# Patient Record
Sex: FEMALE | Race: WHITE | Employment: OTHER | URBAN - METROPOLITAN AREA
[De-identification: names, ages, dates, MRNs, and addresses within clinical notes are randomized per-mention and may not be internally consistent; named-entity substitution may affect disease eponyms.]

---

## 2019-04-19 ENCOUNTER — OFFICE VISIT (OUTPATIENT)
Dept: INTERNAL MEDICINE CLINIC | Age: 74
End: 2019-04-19

## 2019-04-19 VITALS
TEMPERATURE: 98.2 F | OXYGEN SATURATION: 96 % | RESPIRATION RATE: 14 BRPM | WEIGHT: 239 LBS | SYSTOLIC BLOOD PRESSURE: 128 MMHG | DIASTOLIC BLOOD PRESSURE: 64 MMHG | HEIGHT: 66 IN | BODY MASS INDEX: 38.41 KG/M2 | HEART RATE: 71 BPM

## 2019-04-19 DIAGNOSIS — E03.9 HYPOTHYROIDISM, UNSPECIFIED TYPE: ICD-10-CM

## 2019-04-19 DIAGNOSIS — Z76.89 ENCOUNTER TO ESTABLISH CARE: ICD-10-CM

## 2019-04-19 DIAGNOSIS — M85.80 OSTEOPENIA, UNSPECIFIED LOCATION: ICD-10-CM

## 2019-04-19 DIAGNOSIS — E66.9 CLASS 2 OBESITY WITHOUT SERIOUS COMORBIDITY IN ADULT, UNSPECIFIED BMI, UNSPECIFIED OBESITY TYPE: ICD-10-CM

## 2019-04-19 DIAGNOSIS — M25.552 LEFT HIP PAIN: ICD-10-CM

## 2019-04-19 DIAGNOSIS — J44.9 CHRONIC OBSTRUCTIVE PULMONARY DISEASE, UNSPECIFIED COPD TYPE (HCC): Primary | ICD-10-CM

## 2019-04-19 DIAGNOSIS — R73.09 ELEVATED GLUCOSE: ICD-10-CM

## 2019-04-19 DIAGNOSIS — M54.50 ACUTE MIDLINE LOW BACK PAIN WITHOUT SCIATICA: ICD-10-CM

## 2019-04-19 DIAGNOSIS — E78.00 HIGH CHOLESTEROL: ICD-10-CM

## 2019-04-19 DIAGNOSIS — J44.9 COPD, MILD (HCC): ICD-10-CM

## 2019-04-19 RX ORDER — CLOBETASOL PROPIONATE 0.5 MG/G
CREAM TOPICAL
COMMUNITY
End: 2019-06-04 | Stop reason: SDUPTHER

## 2019-04-19 RX ORDER — LEVOTHYROXINE SODIUM 112 UG/1
TABLET ORAL
COMMUNITY
End: 2019-06-24 | Stop reason: SDUPTHER

## 2019-04-19 RX ORDER — SIMVASTATIN 20 MG/1
TABLET, FILM COATED ORAL
COMMUNITY
End: 2019-06-24 | Stop reason: SDUPTHER

## 2019-04-19 RX ORDER — CLOBETASOL PROPIONATE 0.05 MG/G
GEL TOPICAL AS NEEDED
COMMUNITY
End: 2019-09-12 | Stop reason: SDUPTHER

## 2019-04-19 NOTE — PROGRESS NOTES
Sandy Clay is a 75 y.o.  female and presents with    Chief Complaint   Patient presents with   [facility]     intermittent left hip pain worse when walking- no urinary symptoms x 2 weeks   room 11       Subjective:  HPI  Mrs. Yuko Lezama presents today to [facility]. She reports with left hip pain worse with walking. She reports last year with similar symptoms. Denies numbness and tingling, edema, heat, redness, reports catches when moves a certain way. She is taking Advil with relief for a couple of hours. She reports never had xrays. Does report hx of osteopenia on DEXA completed 2018. She reports used movers. She denies trauma. Mrs. Yuko Lezama is , reports  is sick, she moved her from [place], [place] recently. She is with a history hyperlipidemia, she is taking Simvastin. She is with hypothyroidism, on Levothyroxine. She is with acid reflux. Taking OTC Zantac if needed with relief. Tomato based foods are the trigger. She is with a hx of bilateral knee replacement 2013, 2016. She reports is with history of osteopenia. She is taking OTC ca with vitamin d and a multivitamin. She is a former smoker, quit 2009. She is diagnosed with mild COPD, reports was sent for PFTs and reports failed it? HM includes: DEXA 11/2018, mammogram 11/2018, Influenza 10/2018, pneumo 23? 6/2015, Zostavax 2012, Tdap ? Additional Concerns: none     ROS   Review of Systems   Musculoskeletal: Positive for back pain and joint pain. All other systems reviewed and are negative. Allergies   Allergen Reactions    Penicillins Rash       Current Outpatient Medications   Medication Sig Dispense Refill    simvastatin (ZOCOR) 20 mg tablet Take  by mouth nightly.  levothyroxine (SYNTHROID) 112 mcg tablet Take  by mouth Daily (before breakfast).  clobetasol (TEMOVATE) 0.05 % topical gel Apply  to affected area as needed.       clobetasol (TEMOVATE) 0.05 % topical cream Apply  to affected area daily as needed for Skin Irritation.  umeclidinium (INCRUSE ELLIPTA) 62.5 mcg/actuation inhaler Take 1 Puff by inhalation daily.  1 Inhaler 3       Social History     Socioeconomic History    Marital status:      Spouse name: Not on file    Number of children: Not on file    Years of education: Not on file    Highest education level: Not on file   Occupational History    Not on file   Social Needs    Financial resource strain: Not on file    Food insecurity:     Worry: Not on file     Inability: Not on file    Transportation needs:     Medical: Not on file     Non-medical: Not on file   Tobacco Use    Smoking status: Former Smoker     Packs/day: 0.50     Years: 40.00     Pack years: 20.00     Last attempt to quit: 4/19/2009     Years since quitting: 10.0    Smokeless tobacco: Never Used   Substance and Sexual Activity    Alcohol use: Not Currently    Drug use: Not Currently    Sexual activity: Yes     Partners: Male   Lifestyle    Physical activity:     Days per week: Not on file     Minutes per session: Not on file    Stress: Not on file   Relationships    Social connections:     Talks on phone: Not on file     Gets together: Not on file     Attends Mu-ism service: Not on file     Active member of club or organization: Not on file     Attends meetings of clubs or organizations: Not on file     Relationship status: Not on file    Intimate partner violence:     Fear of current or ex partner: Not on file     Emotionally abused: Not on file     Physically abused: Not on file     Forced sexual activity: Not on file   Other Topics Concern    Not on file   Social History Narrative    Not on file       Past Medical History:   Diagnosis Date    Acid reflux     COPD, mild ([address]     High cholesterol     Hypothyroidism     Joint pain     Joint swelling     Lichen planus     Sinus problem     Urinary frequency        Past Surgical History:   Procedure Laterality Date    HX KNEE REPLACEMENT Bilateral     left 2013, right 2016    HX PARTIAL HYSTERECTOMY  1967       Family History   Problem Relation Age of Onset    Heart Disease Mother     Hypertension Mother     No Known Problems Father     Breast Cancer Paternal Aunt     Diabetes Maternal Grandmother     Cancer Brother         pancreatic cancer       Objective:  Vitals:    04/19/19 1154   BP: 128/64   Pulse: 71   Resp: 14   Temp: 98.2 °F (36.8 °C)   TempSrc: Oral   SpO2: 96%   Weight: 239 lb (108.4 kg)   Height: 5' 6.25\" (1.683 m)   PainSc:   5   PainLoc: Hip       LABS   No results found for this or any previous visit. TESTS  none    PE  Physical Exam   Constitutional: She is oriented to person, place, and time. She appears well-developed and well-nourished. No distress. HENT:   Head: Normocephalic and atraumatic. Eyes: Conjunctivae are normal.   Neck: Normal range of motion. Cardiovascular: Normal rate, regular rhythm, normal heart sounds and intact distal pulses. No murmur heard. Pulmonary/Chest: Effort normal and breath sounds normal. No respiratory distress. She has no wheezes. She has no rales. She exhibits no tenderness. Abdominal: Soft. Bowel sounds are normal.   Truncal obesity   Musculoskeletal: Normal range of motion. Positive left straight leg raise, TTP over lumbar spine on palpation that radiates linearly to left hip region, no elongation limb, no rash or s/s infection   Lymphadenopathy:     She has no cervical adenopathy. Neurological: She is alert and oriented to person, place, and time. She has normal reflexes. Skin: Skin is warm and dry. She is not diaphoretic. No erythema. Psychiatric: She has a normal mood and affect. Her behavior is normal. Judgment and thought content normal.         Assessment/Plan:    1. Establish care- Labs ordered to be completed with fasting. 2. Hx HPL- Labs ordered.     3. Lumbar spinal pain with left hip pain- xray lumbar and hip/pelvis ordered- patient to complete next week per her, vitamin d ordered, reported osteopenia on Dexa 11/2018 reported    4. Obesity- lifestyle changes. Lab review: orders written for new lab studies as appropriate; see orders    Today's Visit:   Diagnoses and all orders for this visit:    1. Chronic obstructive pulmonary disease, unspecified COPD type (HCC)  -     umeclidinium (INCRUSE ELLIPTA) 62.5 mcg/actuation inhaler; Take 1 Puff by inhalation daily. 2. Encounter to establish care  -     CBC WITH AUTOMATED DIFF; Future  -     METABOLIC PANEL, COMPREHENSIVE; Future  -     HEMOGLOBIN A1C WITH EAG; Future  -     LIPID PANEL; Future  -     MICROALBUMIN, UR, RAND W/ MICROALB/CREAT RATIO; Future  -     TSH 3RD GENERATION; Future  -     URINALYSIS W/ RFLX MICROSCOPIC; Future  -     VITAMIN D, 25 HYDROXY; Future    3. Acute midline low back pain without sciatica  -     CBC WITH AUTOMATED DIFF; Future  -     METABOLIC PANEL, COMPREHENSIVE; Future  -     HEMOGLOBIN A1C WITH EAG; Future  -     LIPID PANEL; Future  -     MICROALBUMIN, UR, RAND W/ MICROALB/CREAT RATIO; Future  -     TSH 3RD GENERATION; Future  -     URINALYSIS W/ RFLX MICROSCOPIC; Future  -     VITAMIN D, 25 HYDROXY; Future  -     XR SPINE LUMB 2 OR 3 V; Future  -     XR HIP LT W OR WO PELV 2-3 VWS; Future    4. Left hip pain  -     CBC WITH AUTOMATED DIFF; Future  -     METABOLIC PANEL, COMPREHENSIVE; Future  -     HEMOGLOBIN A1C WITH EAG; Future  -     LIPID PANEL; Future  -     MICROALBUMIN, UR, RAND W/ MICROALB/CREAT RATIO; Future  -     TSH 3RD GENERATION; Future  -     URINALYSIS W/ RFLX MICROSCOPIC; Future  -     VITAMIN D, 25 HYDROXY; Future  -     XR SPINE LUMB 2 OR 3 V; Future  -     XR HIP LT W OR WO PELV 2-3 VWS; Future    5. Hypothyroidism, unspecified type  -     CBC WITH AUTOMATED DIFF; Future  -     METABOLIC PANEL, COMPREHENSIVE; Future  -     HEMOGLOBIN A1C WITH EAG; Future  -     LIPID PANEL;  Future  -     MICROALBUMIN, UR, RAND W/ MICROALB/CREAT RATIO; Future  -     TSH 3RD GENERATION; Future  -     URINALYSIS W/ RFLX MICROSCOPIC; Future  -     VITAMIN D, 25 HYDROXY; Future    6. COPD, mild ([address]    7. High cholesterol  -     CBC WITH AUTOMATED DIFF; Future  -     METABOLIC PANEL, COMPREHENSIVE; Future  -     HEMOGLOBIN A1C WITH EAG; Future  -     LIPID PANEL; Future  -     MICROALBUMIN, UR, RAND W/ MICROALB/CREAT RATIO; Future  -     TSH 3RD GENERATION; Future  -     URINALYSIS W/ RFLX MICROSCOPIC; Future  -     VITAMIN D, 25 HYDROXY; Future    8. Class 2 obesity without serious comorbidity in adult, unspecified BMI, unspecified obesity type  -     CBC WITH AUTOMATED DIFF; Future  -     METABOLIC PANEL, COMPREHENSIVE; Future  -     HEMOGLOBIN A1C WITH EAG; Future  -     LIPID PANEL; Future  -     MICROALBUMIN, UR, RAND W/ MICROALB/CREAT RATIO; Future  -     TSH 3RD GENERATION; Future  -     URINALYSIS W/ RFLX MICROSCOPIC; Future  -     VITAMIN D, 25 HYDROXY; Future    9. Osteopenia, unspecified location    10. Elevated glucose  -     HEMOGLOBIN A1C WITH EAG; Future            Health Maintenance: Pending records. I have discussed the diagnosis with the patient and the intended plan as seen in the above orders. The patient has received an after-visit summary and questions were answered concerning future plans. I have discussed medication side effects and warnings with the patient as well. I have reviewed the plan of care with the patient, accepted their input and they are in agreement with the treatment goals. More than 1/2 of this 30 minute visit was spent in counseling and coordination of care, as described above.     BECKY Crockett  Internist of [address], [facility] Candy Escamilla.  Phone: [phone]  Fax: [phone]

## 2019-04-19 NOTE — PROGRESS NOTES
Sandy Clay presents today for   Chief Complaint   Patient presents with   [facility] Establish Care     intermittent left side back pain worse when walking- no urinary symptoms x 2 weeks   room 11              Depression Screening:  3 most recent PHQ Screens 4/19/2019   Little interest or pleasure in doing things Not at all   Feeling down, depressed, irritable, or hopeless Not at all   Total Score PHQ 2 0       Learning Assessment:  Learning Assessment 4/19/2019   PRIMARY LEARNER Patient   HIGHEST LEVEL OF EDUCATION - PRIMARY LEARNER  [id number] [address] PRIMARY LEARNER NONE   CO-LEARNER CAREGIVER No   PRIMARY LANGUAGE ENGLISH   LEARNER PREFERENCE PRIMARY READING   ANSWERED BY PATIENT   RELATIONSHIP SELF       Abuse Screening:  Abuse Screening Questionnaire 4/19/2019   Do you ever feel afraid of your partner? N   Are you in a relationship with someone who physically or mentally threatens you? N   Is it safe for you to go home? Y       Fall Risk  Fall Risk Assessment, last 12 mths 4/19/2019   Able to walk? Yes   Fall in past 12 months? No           Coordination of Care:  1. Have you been to the ER, urgent care clinic since your last visit? NO   Hospitalized since your last visit? NO     2. Have you seen or consulted any other health care providers outside of the [address] [place] [address] [facility] since your last visit? Include any pap smears or colon screening.  Recent move 3/5/19 from [facility]

## 2019-04-21 PROBLEM — E66.01 SEVERE OBESITY (HCC): Status: ACTIVE | Noted: 2019-04-21

## 2019-04-23 ENCOUNTER — HOSPITAL ENCOUNTER (OUTPATIENT)
Dept: LAB | Age: 74
Discharge: HOME | End: 2019-04-23

## 2019-04-23 ENCOUNTER — HOSPITAL ENCOUNTER (OUTPATIENT)
Dept: GENERAL RADIOLOGY | Age: 74
Discharge: HOME | End: 2019-04-23
Payer: MEDICARE

## 2019-04-23 DIAGNOSIS — M25.552 LEFT HIP PAIN: ICD-10-CM

## 2019-04-23 DIAGNOSIS — M54.50 ACUTE MIDLINE LOW BACK PAIN WITHOUT SCIATICA: ICD-10-CM

## 2019-04-23 LAB — XX-LABCORP SPECIMEN COL,LCBCF: NORMAL

## 2019-04-23 PROCEDURE — 73502 X-RAY EXAM HIP UNI 2-3 VIEWS: CPT

## 2019-04-23 PROCEDURE — 72100 X-RAY EXAM L-S SPINE 2/3 VWS: CPT

## 2019-04-23 PROCEDURE — 99001 SPECIMEN HANDLING PT-LAB: CPT

## 2019-04-24 ENCOUNTER — TELEPHONE (OUTPATIENT)
Dept: INTERNAL MEDICINE CLINIC | Age: 74
End: 2019-04-24

## 2019-04-24 LAB
25(OH)D3+25(OH)D2 SERPL-MCNC: 71.7 NG/ML (ref 30–100)
ALBUMIN SERPL-MCNC: 4 G/DL (ref 3.5–4.8)
ALBUMIN/CREAT UR: <5.3 MG/G CREAT (ref 0–30)
ALBUMIN/GLOB SERPL: 1.7 {RATIO} (ref 1.2–2.2)
ALP SERPL-CCNC: 93 IU/L (ref 39–117)
ALT SERPL-CCNC: 13 IU/L (ref 0–32)
APPEARANCE UR: CLEAR
AST SERPL-CCNC: 19 IU/L (ref 0–40)
BASOPHILS # BLD AUTO: 0 X10E3/UL (ref 0–0.2)
BASOPHILS NFR BLD AUTO: 1 %
BILIRUB SERPL-MCNC: 0.4 MG/DL (ref 0–1.2)
BILIRUB UR QL STRIP: NEGATIVE
BUN SERPL-MCNC: 15 MG/DL (ref 8–27)
BUN/CREAT SERPL: 23 (ref 12–28)
CALCIUM SERPL-MCNC: 9.3 MG/DL (ref 8.7–10.3)
CHLORIDE SERPL-SCNC: 102 MMOL/L (ref 96–106)
CHOLEST SERPL-MCNC: 167 MG/DL (ref 100–199)
CO2 SERPL-SCNC: 25 MMOL/L (ref 20–29)
COLOR UR: YELLOW
CREAT SERPL-MCNC: 0.66 MG/DL (ref 0.57–1)
CREAT UR-MCNC: 56.2 MG/DL
EOSINOPHIL # BLD AUTO: 0.1 X10E3/UL (ref 0–0.4)
EOSINOPHIL NFR BLD AUTO: 2 %
ERYTHROCYTE [DISTWIDTH] IN BLOOD BY AUTOMATED COUNT: 14.3 % (ref 12.3–15.4)
EST. AVERAGE GLUCOSE BLD GHB EST-MCNC: 105 MG/DL
GLOBULIN SER CALC-MCNC: 2.4 G/DL (ref 1.5–4.5)
GLUCOSE SERPL-MCNC: 84 MG/DL (ref 65–99)
GLUCOSE UR QL: NEGATIVE
HBA1C MFR BLD: 5.3 % (ref 4.8–5.6)
HCT VFR BLD AUTO: 40.5 % (ref 34–46.6)
HDLC SERPL-MCNC: 63 MG/DL
HGB BLD-MCNC: 13.6 G/DL (ref 11.1–15.9)
HGB UR QL STRIP: NEGATIVE
IMM GRANULOCYTES # BLD AUTO: 0 X10E3/UL (ref 0–0.1)
IMM GRANULOCYTES NFR BLD AUTO: 0 %
INTERPRETATION, 910389: NORMAL
KETONES UR QL STRIP: NEGATIVE
LDLC SERPL CALC-MCNC: 91 MG/DL (ref 0–99)
LEUKOCYTE ESTERASE UR QL STRIP: NEGATIVE
LYMPHOCYTES # BLD AUTO: 1.3 X10E3/UL (ref 0.7–3.1)
LYMPHOCYTES NFR BLD AUTO: 25 %
MCH RBC QN AUTO: 31.8 PG (ref 26.6–33)
MCHC RBC AUTO-ENTMCNC: 33.6 G/DL (ref 31.5–35.7)
MCV RBC AUTO: 95 FL (ref 79–97)
MICRO URNS: NORMAL
MICROALBUMIN UR-MCNC: <3 UG/ML
MONOCYTES # BLD AUTO: 0.5 X10E3/UL (ref 0.1–0.9)
MONOCYTES NFR BLD AUTO: 10 %
NEUTROPHILS # BLD AUTO: 3.3 X10E3/UL (ref 1.4–7)
NEUTROPHILS NFR BLD AUTO: 62 %
NITRITE UR QL STRIP: NEGATIVE
PH UR STRIP: 6.5 [PH] (ref 5–7.5)
PLATELET # BLD AUTO: 189 X10E3/UL (ref 150–379)
POTASSIUM SERPL-SCNC: 4.4 MMOL/L (ref 3.5–5.2)
PROT SERPL-MCNC: 6.4 G/DL (ref 6–8.5)
PROT UR QL STRIP: NEGATIVE
RBC # BLD AUTO: 4.28 X10E6/UL (ref 3.77–5.28)
SODIUM SERPL-SCNC: 142 MMOL/L (ref 134–144)
SP GR UR: 1.01 (ref 1–1.03)
TRIGL SERPL-MCNC: 63 MG/DL (ref 0–149)
TSH SERPL DL<=0.005 MIU/L-ACNC: 2.19 UIU/ML (ref 0.45–4.5)
UROBILINOGEN UR STRIP-MCNC: 0.2 MG/DL (ref 0.2–1)
VLDLC SERPL CALC-MCNC: 13 MG/DL (ref 5–40)
WBC # BLD AUTO: 5.3 X10E3/UL (ref 3.4–10.8)

## 2019-04-24 NOTE — PROGRESS NOTES
Spine- Multilevel degenerative spondylosis (spinal osteoarthritis) and  disc disease noted with multiple bridging osteophytes, with relatively most  prominent disc height loss at L4/L5 and L5/S1.     Lt hip- normal

## 2019-04-24 NOTE — TELEPHONE ENCOUNTER
Chief Complaint   Patient presents with    Results     done 4-23-19 Xray Left Hip with or without PELV 2-3 VWS and Xray Spine LUMB 2 or 3 vws per NP Avani     04-24-19 Patient reached and 2 identifiers were used: Full Name, and Date of Birth verified. The patient was informed of all results, and understands all. The patient has no further questions at this time.

## 2019-04-24 NOTE — TELEPHONE ENCOUNTER
----- Message from Marisa Cuellar NP sent at 4/23/2019  9:27 PM EDT -----  Spine- Multilevel degenerative spondylosis (spinal osteoarthritis) and  disc disease noted with multiple bridging osteophytes, with relatively most  prominent disc height loss at L4/L5 and L5/S1.     Lt hip- normal

## 2019-04-25 ENCOUNTER — TELEPHONE (OUTPATIENT)
Dept: INTERNAL MEDICINE CLINIC | Age: 74
End: 2019-04-25

## 2019-06-03 ENCOUNTER — TELEPHONE (OUTPATIENT)
Dept: INTERNAL MEDICINE CLINIC | Age: 74
End: 2019-06-03

## 2019-06-03 NOTE — TELEPHONE ENCOUNTER
Pt was seen at [facility] 06/02/2019 she is being referred to [facility] in [place] pts appt is 06/04 at 245pm her insurance requires a referral    Please submit ICD S42.20a  Fax

## 2019-06-04 ENCOUNTER — OFFICE VISIT (OUTPATIENT)
Dept: ORTHOPEDIC SURGERY | Age: 74
End: 2019-06-04

## 2019-06-04 VITALS
WEIGHT: 242 LBS | DIASTOLIC BLOOD PRESSURE: 68 MMHG | TEMPERATURE: 97.8 F | HEIGHT: 66 IN | HEART RATE: 75 BPM | RESPIRATION RATE: 16 BRPM | OXYGEN SATURATION: 97 % | SYSTOLIC BLOOD PRESSURE: 144 MMHG | BODY MASS INDEX: 38.89 KG/M2

## 2019-06-04 DIAGNOSIS — S42.252A DISPLACED FRACTURE OF GREATER TUBEROSITY OF LEFT HUMERUS, INITIAL ENCOUNTER FOR CLOSED FRACTURE: Primary | ICD-10-CM

## 2019-06-04 DIAGNOSIS — M25.512 LEFT SHOULDER PAIN, UNSPECIFIED CHRONICITY: ICD-10-CM

## 2019-06-04 RX ORDER — ASPIRIN 81 MG/1
81 TABLET ORAL DAILY
COMMUNITY

## 2019-06-04 RX ORDER — ERGOCALCIFEROL 1.25 MG/1
50000 CAPSULE ORAL
COMMUNITY
End: 2019-09-12 | Stop reason: DRUGHIGH

## 2019-06-04 RX ORDER — BISMUTH SUBSALICYLATE 262 MG
1 TABLET,CHEWABLE ORAL DAILY
COMMUNITY

## 2019-06-04 RX ORDER — HYDROCODONE BITARTRATE AND ACETAMINOPHEN 5; 325 MG/1; MG/1
1 TABLET ORAL
Qty: 28 TAB | Refills: 0 | Status: SHIPPED | OUTPATIENT
Start: 2019-06-04 | End: 2019-06-11

## 2019-06-04 NOTE — PROGRESS NOTES
1. Have you been to the ER, urgent care clinic since your last visit? Hospitalized since your last visit? YES, [facility]       2. Have you seen or consulted any other health care providers outside of the [address] [place] [address] since your last visit? Include any pap smears or colon screening.  NO

## 2019-06-04 NOTE — PROGRESS NOTES
Micheal Pimentel  [birth year]   Chief Complaint   Patient presents with    Shoulder Pain     RIGHT SHOULDER PAIN        HISTORY OF PRESENT ILLNESS  Micheal Pimentel is a 75 y.o. female who presents today for evaluation of left shoulder pain. Patient fell in kitchen on Sunday. Has been having a hard time moving arm since. Patient describes the pain as aching, sharp and throbbing that is Constant in nature. Symptoms are worse with movement and is better with  rest.  Associated symptoms include Swelling. Since problem started, it: is unchanged. Pain does wake patient up at night. Has taken nothing for the problem. Pain is a 5/10. Has tried following treatments: Injections:NO; Brace:YES; Therapy:NO; Cane/Crutch:NO        .      Allergies   Allergen Reactions    Penicillins Rash        Past Medical History:   Diagnosis Date    Acid reflux     COPD, mild (HCC)     High cholesterol     Hypothyroidism     Joint pain     Joint swelling     Lichen planus     Sinus problem     Urinary frequency       Social History     Socioeconomic History    Marital status:      Spouse name: Not on file    Number of children: Not on file    Years of education: Not on file    Highest education level: Not on file   Occupational History    Not on file   Social Needs    Financial resource strain: Not on file    Food insecurity:     Worry: Not on file     Inability: Not on file    Transportation needs:     Medical: Not on file     Non-medical: Not on file   Tobacco Use    Smoking status: Former Smoker     Packs/day: 0.50     Years: 40.00     Pack years: 20.00     Last attempt to quit: 4/19/2009     Years since quitting: 10.1    Smokeless tobacco: Never Used   Substance and Sexual Activity    Alcohol use: Not Currently    Drug use: Not Currently    Sexual activity: Yes     Partners: Male   Lifestyle    Physical activity:     Days per week: Not on file     Minutes per session: Not on file    Stress: Not on file   Relationships    Social connections:     Talks on phone: Not on file     Gets together: Not on file     Attends Mandaen service: Not on file     Active member of club or organization: Not on file     Attends meetings of clubs or organizations: Not on file     Relationship status: Not on file    Intimate partner violence:     Fear of current or ex partner: Not on file     Emotionally abused: Not on file     Physically abused: Not on file     Forced sexual activity: Not on file   Other Topics Concern    Not on file   Social History Narrative    Not on file      Past Surgical History:   Procedure Laterality Date    HX KNEE REPLACEMENT Bilateral     left 2013, right 2016    HX PARTIAL HYSTERECTOMY  1967      Family History   Problem Relation Age of Onset    Heart Disease Mother     Hypertension Mother     No Known Problems Father     Breast Cancer Paternal Aunt     Diabetes Maternal Grandmother     Cancer Brother         pancreatic cancer      Current Outpatient Medications   Medication Sig    aspirin delayed-release 81 mg tablet Take  by mouth daily.  multivitamin (ONE A DAY) tablet Take 1 Tab by mouth daily.  ergocalciferol (VITAMIN D2) 50,000 unit capsule Take 50,000 Units by mouth.  HYDROcodone-acetaminophen (NORCO) 5-325 mg per tablet Take 1 Tab by mouth every six (6) hours as needed for Pain for up to 7 days. Max Daily Amount: 4 Tabs.  simvastatin (ZOCOR) 20 mg tablet Take  by mouth nightly.  levothyroxine (SYNTHROID) 112 mcg tablet Take  by mouth Daily (before breakfast).  clobetasol (TEMOVATE) 0.05 % topical gel Apply  to affected area as needed.  umeclidinium (INCRUSE ELLIPTA) 62.5 mcg/actuation inhaler Take 1 Puff by inhalation daily. No current facility-administered medications for this visit. REVIEW OF SYSTEM   Patient denies: Weight loss, Fever/Chills, HA, Visual changes, Fatigue, Chest pain, SOB, Abdominal pain, N/V/D/C, Blood in stool or urine, Edema. Pertinent positive as above in HPI. All others were negative    PHYSICAL EXAM:   Visit Vitals  /68 (BP 1 Location: Right arm, BP Patient Position: Sitting)   Pulse 75   Temp 97.8 °F (36.6 °C) (Oral)   Resp 16   Ht 5' 6.25\" (1.683 m)   Wt 242 lb (109.8 kg)   SpO2 97%   BMI 38.77 kg/m²     The patient is a well-developed, well-nourished female   in no acute distress. The patient is alert and oriented times three. The patient is alert and oriented times three. Mood and affect are normal.  LYMPHATIC: lymph nodes are not enlarged and are within normal limits  SKIN: normal in color and non tender to palpation. There are no bruises or abrasions noted. NEUROLOGICAL: Motor sensory exam is within normal limits. Reflexes are equal bilaterally. There is normal sensation to pinprick and light touch  MUSCULOSKELETAL:  Examination Left shoulder   Skin Intact   AC joint tenderness -   Biceps tenderness -   Forward flexion/Elevation ROM 40   Active abduction ROM 40   Glenohumeral abduction 20   External rotation ROM 10   Internal rotation ROM 10   Apprehension -   Tomass Relocation -   Jerk -   Load and Shift -   Obriens -   Speeds -   Impingement sign -   Supraspinatus/Empty Can -, 5/5   External Rotation Strength -, 5/5   Lift Off/Belly Press -, 5/5   Neurovascular Intact     ttp ant lateral aspect of shoulder         IMAGING: 3 view xray images of left shoulder  on 6/4/2019 read and reviewed by myself reveal minimal proximal displacement of greater tuberosity displacement      IMPRESSION:      ICD-10-CM ICD-9-CM    1. Displaced fracture of greater tuberosity of left humerus, initial encounter for closed fracture S42.252A 812.03 HYDROcodone-acetaminophen (NORCO) 5-325 mg per tablet      AMB SUPPLY ORDER   2. Left shoulder pain, unspecified chronicity M25.512 719.41 AMB POC XRAY, SHOULDER; COMPLETE, 2+      AMB SUPPLY ORDER        PLAN:   1.  Patient with minimally proximal displaced greater tuberosity fracture s/p fall. Sling. Ice and pain control. Stressed no active range of motion of shoulder. No lifitng, pushing or pulling. Will cont to monitor fragment. Given age will hold on surgical intervention unless fragment worsens. Case was discussed with Manav Liang MD  Risk factors include: age, BMI>35  2. No cortisone injection indicated today =  3. No Physical/Occupational Therapy indicated today  4. No diagnostic test indicated today:   5. Yes durable medical equipment indicated today SLING  6. No referral indicated today   7. No medications indicated today:   8. No Narcotic indicated today     RTC 2 weeks for repeat xrays.  Will have Dr. Nicolás Friedman see at next visit formally        [facility]

## 2019-06-18 ENCOUNTER — OFFICE VISIT (OUTPATIENT)
Dept: ORTHOPEDIC SURGERY | Age: 74
End: 2019-06-18

## 2019-06-18 VITALS
SYSTOLIC BLOOD PRESSURE: 129 MMHG | TEMPERATURE: 97.6 F | OXYGEN SATURATION: 97 % | DIASTOLIC BLOOD PRESSURE: 76 MMHG | HEART RATE: 82 BPM | HEIGHT: 66 IN | RESPIRATION RATE: 17 BRPM | BODY MASS INDEX: 38.7 KG/M2 | WEIGHT: 240.8 LBS

## 2019-06-18 DIAGNOSIS — S42.252A DISPLACED FRACTURE OF GREATER TUBEROSITY OF LEFT HUMERUS, INITIAL ENCOUNTER FOR CLOSED FRACTURE: Primary | ICD-10-CM

## 2019-06-18 NOTE — PROGRESS NOTES
Anirudh Ruelas  [birth year]   Chief Complaint   Patient presents with    Shoulder Pain     left shoulder pain        HISTORY OF PRESENT ILLNESS  Anirudh Ruelas is a 75 y.o. female who presents today for evaluation of left shoulder pain. Patient fell in kitchen on 6/2/19. Today she states her pain is a 3/10. She presents in her sling. She notes she is feeling a little better. Describes shoulder as sore. Hurts if she tries to move it. Patient denies any fever, chills, chest pain, shortness of breath or calf pain. The remainder of the review of systems is negative. There are no new illness or injuries to report since last seen in the office. No changes in medications, allergies, social or family history. PHYSICAL EXAM:   Visit Vitals  /76   Pulse 82   Temp 97.6 °F (36.4 °C) (Oral)   Resp 17   Ht 5' 6.25\" (1.683 m)   Wt 240 lb 12.8 oz (109.2 kg)   SpO2 97%   BMI 38.57 kg/m²     The patient is a well-developed, well-nourished female   in no acute distress. The patient is alert and oriented times three. The patient is alert and oriented times three. Mood and affect are normal.  LYMPHATIC: lymph nodes are not enlarged and are within normal limits  SKIN: normal in color and non tender to palpation. There are no bruises or abrasions noted. NEUROLOGICAL: Motor sensory exam is within normal limits. Reflexes are equal bilaterally.  There is normal sensation to pinprick and light touch  MUSCULOSKELETAL:  Examination Left shoulder   Skin Intact, bruising noted   AC joint tenderness -   Biceps tenderness -   Forward flexion/Elevation ROM 40   Active abduction ROM 40   Glenohumeral abduction 20   External rotation ROM 10   Internal rotation ROM 10   Apprehension -   Tomass Relocation -   Jerk -   Load and Shift -   Obriens -   Speeds -   Impingement sign -   Supraspinatus/Empty Can -, 5/5   External Rotation Strength -, 5/5   Lift Off/Belly Press -, 5/5   Neurovascular Intact     ttp ant lateral aspect of shoulder       IMAGING: 3 view xray images of left shoulder  on 6/18/2019 read and reviewed by myself reveal minimal proximal displacement of greater tuberosity displacement. No change from previous xray      IMPRESSION:      ICD-10-CM ICD-9-CM    1. Displaced fracture of greater tuberosity of left humerus, initial encounter for closed fracture S42.252A 812.03 AMB POC XRAY, SHOULDER; COMPLETE, 2+        PLAN:   1. Patient with minimally proximal displaced greater tuberosity fracture s/p fall. Sling for comfort only. Ice and pain control. Stressed no active range of motion of shoulder. No lifitng, pushing or pulling. Will cont to monitor fragment. Given age will hold on surgical intervention unless fragment worsens. Case was discussed with Valery Mullins MD  Risk factors include: age, BMI>35  2. No cortisone injection indicated today   3. YES Physical/Occupational Therapy indicated today: PROM left shoulder  4. No diagnostic test indicated today:   5. Yes durable medical equipment indicated today SLING  6. No referral indicated today   7. No medications indicated today:   8. No Narcotic indicated today     RTC 3 weeks for repeat xrays.       [facility]

## 2019-06-18 NOTE — PROGRESS NOTES
1. Have you been to the ER, urgent care clinic since your last visit? Hospitalized since your last visit? No    2. Have you seen or consulted any other health care providers outside of the [address] [place] [address] since your last visit? Include any pap smears or colon screening.  No

## 2019-06-24 ENCOUNTER — HOSPITAL ENCOUNTER (OUTPATIENT)
Dept: PHYSICAL THERAPY | Age: 74
Discharge: HOME | End: 2019-06-24
Payer: MEDICARE

## 2019-06-24 PROCEDURE — 97162 PT EVAL MOD COMPLEX 30 MIN: CPT

## 2019-06-24 PROCEDURE — 97110 THERAPEUTIC EXERCISES: CPT

## 2019-06-24 NOTE — PROGRESS NOTES
[facility]  [address] [facility] [address]  [place], [address]  [phone] fax    Plan of Care/ Statement of Necessity for Physical Therapy Services    Patient name: Dejuan Rendon Start of Care: 2019   Referral source: Esvin Esquivel : 1945    Medical Diagnosis: Left shoulder pain [M25.512]  Displaced fracture of greater tuberosity of left humerus, initial encounter for closed fracture [S42.252A]  Payor: Sara Wahl / Plan: [address] HMO / Product Type: Managed Care Medicare /  Onset Date:19    Treatment Diagnosis: Left shoulder pain s/p left proximal humerus fracture   Prior Hospitalization: see medical history Provider#: [id number]   Medications: Verified on Patient summary List    Comorbidities: arthritis, COPD/emphysema   Prior Level of Function: (I) with ADLs. The Plan of Care and following information is based on the information from the initial evaluation. Assessment/ key information: Patient is a 75 y.o female presenting s/p left proximal humerus fracture on 19 after suffering a fall in her kitchen which she states she tripped over her foot. No surgery was performed. Patient presents with left shoulder in sling. Patient reports minimal pain over the last week. Patient is aware and compliant with all precautions. Patients next MD follow up is 2019. Patient has a small bruise on her left distal arm from the original fall. Patient presents with limited AROM/PROM, decreased strength, ecchymosis, decreased activity tolerance. Patient will benefit from skilled physical therapy in order to improve functional ROM and to improve ease of ADLs.    Evaluation Complexity History MEDIUM  Complexity : 1-2 comorbidities / personal factors will impact the outcome/ POC ; Examination MEDIUM Complexity : 3 Standardized tests and measures addressing body structure, function, activity limitation and / or participation in recreation  ;Presentation MEDIUM Complexity : Evolving with changing characteristics  ; Clinical Decision Making MEDIUM Complexity : FOTO score of 26-74  Overall Complexity Rating: MEDIUM  Problem List: pain affecting function, decrease ROM, decrease strength, decrease ADL/ functional abilitiies, decrease activity tolerance and decrease flexibility/ joint mobility   Treatment Plan may include any combination of the following: Therapeutic exercise, Neuromuscular re-education, Physical agent/modality, Manual therapy, Patient education and Functional mobility training  Patient / Family readiness to learn indicated by: asking questions, trying to perform skills and interest  Persons(s) to be included in education: patient (P)  Barriers to Learning/Limitations: None  Patient Goal (s): move my arm again  Patient Self Reported Health Status: good  Rehabilitation Potential: good    Short Term Goals: To be accomplished in 3 weeks:   1. Patient will be compliant with HEP in order to maximize therapeutic benefit. 2. Patient will be able to improve left shoulder flexion  PROM to 100 degrees in order to improve ease of future ADLs. Long Term Goals: To be accomplished in 8 weeks:   1. Patient will be able to improve left shoulder flexion PROM to 140 degrees in order to improve ease of future ADLs. 2. Patient will be able to begin AAROM exercises void of pain in order to improve ease of ADLs. 3. Patient will be able to improve FOTO score to 64 in order to demonstrate improvements in functional independence. 4. Patient will be able to improve left shoulder flexion AROM to 130 degrees in order to improve ease of reaching a shelf. Frequency / Duration: Patient to be seen 2 times per week for 8 weeks.     Patient/ Caregiver education and instruction: Diagnosis, prognosis, activity modification and exercises   [x]  Plan of care has been reviewed with PTA    Certification Period: 6/24/19 - 8/23/19  Dilip MIKE Jerome Bowman, PT 6/24/2019 9:26 AM    ________________________________________________________________________    I certify that the above Therapy Services are being furnished while the patient is under my care. I agree with the treatment plan and certify that this therapy is necessary.     [de-identified] Signature:____________________  Date:____________Time: _________    Please sign and return to [facility]  [address]  [place], [address]  [phone] fax

## 2019-06-24 NOTE — PROGRESS NOTES
PT DAILY TREATMENT NOTE 10-18    Patient Name: Kosta Luz  Date:2019  : 1945  [x]  Patient  Verified  Payor: Yennifer Francisco / Plan: [address] [place] [address] HMO / Product Type: Managed Care Medicare /    In time:9:04  Out time:9:36  Total Treatment Time (min): 32  Visit #: 1 of 16    Medicare/BCBS Only   Total Timed Codes (min):  17 1:1 Treatment Time:  32       Treatment Area: Left shoulder pain [M25.512]  Displaced fracture of greater tuberosity of left humerus, initial encounter for closed fracture [S42.252A]    SUBJECTIVE  Pain Level (0-10 scale): 2  Any medication changes, allergies to medications, adverse drug reactions, diagnosis change, or new procedure performed?: [x] No    [] Yes (see summary sheet for update)  Subjective functional status/changes:   [] No changes reported  Patient is a 75 y.o female presenting s/p left proximal humerus fracture on 19 after suffering a fall in her kitchen which she states she tripped over her foot. No surgery was performed. Patient presents with left shoulder in sling. Patient reports minimal pain over the last week. Patient is aware and compliant with all precautions. Patients next MD follow up is 2019. Patient has a small bruise on her left distal arm from the original fall. OBJECTIVE      15 min [x]Eval                  []Re-Eval       17 min Therapeutic Exercise:  [x] See flow sheet : (+) left shoulder gentle PROM   Rationale: increase ROM and increase strength to improve the patients ability to perform ADLs.                With   [] TE   [] TA   [] neuro   [] other: Patient Education: [x] Review HEP    [] Progressed/Changed HEP based on:   [] positioning   [] body mechanics   [] transfers   [] heat/ice application    [] other:      Other Objective/Functional Measures: See IE     Pain Level (0-10 scale) post treatment: 2    ASSESSMENT/Changes in Function: Patient presents with limited AROM/PROM, decreased strength, ecchymosis, decreased activity tolerance. Patient will benefit from skilled physical therapy in order to improve functional ROM and to improve ease of ADLs. Patient will continue to benefit from skilled PT services to modify and progress therapeutic interventions, address functional mobility deficits, address ROM deficits, address strength deficits, analyze and address soft tissue restrictions, analyze and cue movement patterns, analyze and modify body mechanics/ergonomics and assess and modify postural abnormalities to attain remaining goals. [x]  See Plan of Care  []  See progress note/recertification  []  See Discharge Summary         Progress towards goals / Updated goals:  Short Term Goals: To be accomplished in 3 weeks:   1. Patient will be compliant with HEP in order to maximize therapeutic benefit. 2. Patient will be able to improve left shoulder flexion  PROM to 100 degrees in order to improve ease of future ADLs. Long Term Goals: To be accomplished in 8 weeks:   1. Patient will be able to improve left shoulder flexion PROM to 140 degrees in order to improve ease of future ADLs. 2. Patient will be able to begin AAROM exercises void of pain in order to improve ease of ADLs. 3. Patient will be able to improve FOTO score to 64 in order to demonstrate improvements in functional independence.     4. Patient will be able to improve left shoulder flexion AROM to 130 degrees in order to improve ease of reaching a shelf    PLAN  []  Upgrade activities as tolerated     [x]  Continue plan of care  []  Update interventions per flow sheet       []  Discharge due to:_  []  Other:_      Navdeep Maharaj PT 6/24/2019  9:27 AM    Future Appointments   Date Time Provider Mary Jane Cho   7/9/2019 10:15 AM JOSE ARMANDO Nash   7/24/2019  9:30 AM Lianna Varma NP [facility]

## 2019-06-24 NOTE — TELEPHONE ENCOUNTER
Last Visit: 4/19/19 with SNEHA Varma  Next Appointment: 7/24/19 with SENHA Varma    Requested Prescriptions     Pending Prescriptions Disp Refills    simvastatin (ZOCOR) 20 mg tablet 90 Tab 0     Sig: Take 1 Tab by mouth nightly.  levothyroxine (SYNTHROID) 112 mcg tablet 90 Tab 0     Sig: Take 1 Tab by mouth Daily (before breakfast).

## 2019-06-25 RX ORDER — SIMVASTATIN 20 MG/1
20 TABLET, FILM COATED ORAL
Qty: 90 TAB | Refills: 0 | Status: SHIPPED | OUTPATIENT
Start: 2019-06-25 | End: 2019-09-12 | Stop reason: SDUPTHER

## 2019-06-25 RX ORDER — LEVOTHYROXINE SODIUM 112 UG/1
112 TABLET ORAL
Qty: 90 TAB | Refills: 0 | Status: SHIPPED | OUTPATIENT
Start: 2019-06-25 | End: 2019-09-12 | Stop reason: SDUPTHER

## 2019-06-26 ENCOUNTER — HOSPITAL ENCOUNTER (OUTPATIENT)
Dept: PHYSICAL THERAPY | Age: 74
Discharge: HOME | End: 2019-06-26
Payer: MEDICARE

## 2019-06-26 PROCEDURE — 97140 MANUAL THERAPY 1/> REGIONS: CPT

## 2019-06-26 PROCEDURE — 97110 THERAPEUTIC EXERCISES: CPT

## 2019-06-26 NOTE — PROGRESS NOTES
PT DAILY TREATMENT NOTE 10-18    Patient Name: Madonna Donnelly  Date:2019  : 1945  [x]  Patient  Verified  Payor: Vikash Oro / Plan: [address] [place] [address] HMO / Product Type: Managed Care Medicare /    In time:9:30  Out time:9:55  Total Treatment Time (min): 25  Visit #: 2 of 16    Medicare/BCBS Only   Total Timed Codes (min):  25 1:1 Treatment Time:  25       Treatment Area: Left shoulder pain [M25.512]  Displaced fracture of greater tuberosity of left humerus, initial encounter for closed fracture [S42.252A]    SUBJECTIVE  Pain Level (0-10 scale): 3  Any medication changes, allergies to medications, adverse drug reactions, diagnosis change, or new procedure performed?: [x] No    [] Yes (see summary sheet for update)  Subjective functional status/changes:   [] No changes reported  Pt states shoulder is little sore    OBJECTIVE      15 min Therapeutic Exercise:  [x] See flow sheet :   Rationale: increase ROM and increase strength to improve the patients ability to perform ADLs    20 min Manual Therapy:  Left shoulder PROM, gentle STM to left UT/ LS and bicep   Rationale: decrease pain, increase ROM and increase tissue extensibility to improve functional mobility            With   [] TE   [] TA   [] neuro   [] other: Patient Education: [x] Review HEP    [] Progressed/Changed HEP based on:   [] positioning   [] body mechanics   [] transfers   [] heat/ice application    [] other:      Other Objective/Functional Measures:   First f/u after Eval     Pain Level (0-10 scale) post treatment: 3    ASSESSMENT/Changes in Function: Initiated treatment program per flow sheet. Reviewed HEP for understanding and compliance. Reviewed how to safely don/doff clothing and sleeping positions with left UE supported. Noted ms tension and soreness today, no change in pain following treatment.      Patient will continue to benefit from skilled PT services to modify and progress therapeutic interventions, address functional mobility deficits, address ROM deficits, address strength deficits, analyze and address soft tissue restrictions and analyze and cue movement patterns to attain remaining goals. []  See Plan of Care  []  See progress note/recertification  []  See Discharge Summary         Progress towards goals / Updated goals:  Short Term Goals: To be accomplished in 3 weeks:              1. Patient will be compliant with HEP in order to maximize therapeutic benefit. Met- Pt reports compliance 6/26/19              2. Patient will be able to improve left shoulder flexion  PROM to 100 degrees in order to improve ease of future ADLs. Long Term Goals: To be accomplished in 8 weeks:              1. Patient will be able to improve left shoulder flexion PROM to 140 degrees in order to improve ease of future ADLs. 2. Patient will be able to begin AAROM exercises void of pain in order to improve ease of ADLs. 3. Patient will be able to improve FOTO score to 64 in order to demonstrate improvements in functional independence.                4. Patient will be able to improve left shoulder flexion AROM to 130 degrees in order to improve ease of reaching a shelf      PLAN  []  Upgrade activities as tolerated     [x]  Continue plan of care  []  Update interventions per flow sheet       []  Discharge due to:_  []  Other:_      Austyn Le PTA 6/26/2019  9:26 AM    Future Appointments   Date Time Provider Mary Jane Cho   6/26/2019  9:30 AM Maria Esther Myles PTA [facility]   7/2/2019  9:00 AM Emily Le PTA [facility]   7/5/2019  2:30 PM Emily Le PTA [facility]   7/9/2019  9:00 AM Maria Esther Myles PTA [facility]   7/9/2019 10:15 AM JOSE ARMANDO Burks [id number] [facility]   7/11/2019 10:00 AM Elli Pike PTA [facility]   7/16/2019  9:00 AM Radha Washburn, AGUEDA [facility]   7/18/2019  9:30 AM Radha Washburn PT [facility]   7/24/2019  9:30 AM Avani Perla Constantino NP [facility]

## 2019-07-02 ENCOUNTER — HOSPITAL ENCOUNTER (OUTPATIENT)
Dept: PHYSICAL THERAPY | Age: 74
Discharge: HOME | End: 2019-07-02
Payer: MEDICARE

## 2019-07-02 PROCEDURE — 97140 MANUAL THERAPY 1/> REGIONS: CPT

## 2019-07-02 PROCEDURE — 97110 THERAPEUTIC EXERCISES: CPT

## 2019-07-02 NOTE — PROGRESS NOTES
PT DAILY TREATMENT NOTE 10-18    Patient Name: Skylar Black  Date:2019  : 1945  [x]  Patient  Verified  Payor: Lisa Denise / Plan: [address] [place] [address] HMO / Product Type: Managed Care Medicare /    In time:8:57  Out time:9:20  Total Treatment Time (min): 23  Visit #: 3 of 16    Medicare/BCBS Only   Total Timed Codes (min):  23 1:1 Treatment Time:  23       Treatment Area: Left shoulder pain [M25.512]  Displaced fracture of greater tuberosity of left humerus, initial encounter for closed fracture [S42.252A]    SUBJECTIVE  Pain Level (0-10 scale): 210  Any medication changes, allergies to medications, adverse drug reactions, diagnosis change, or new procedure performed?: [x] No    [] Yes (see summary sheet for update)  Subjective functional status/changes:   [] No changes reported  Pt reports no new complaints. Pt has continued reports of compliance with HEP. OBJECTIVE    11 min Therapeutic Exercise:  [] See flow sheet :   Rationale: increase ROM and increase strength to improve the patients ability to tolerate ADLs. 12 min Manual Therapy:  PROM to left shoulder; DTM to left UT   Rationale: decrease pain, increase ROM and increase tissue extensibility to improve tolerance to ADLs. With   [] TE   [] TA   [] neuro   [] other: Patient Education: [x] Review HEP    [] Progressed/Changed HEP based on:   [] positioning   [] body mechanics   [] transfers   [] heat/ice application    [] other:      Other Objective/Functional Measures: Added exercises as per flow sheet. Pain Level (0-10 scale) post treatment: 2/10    ASSESSMENT/Changes in Function: Pt demonstrates improved shoulder PROM but is limited by pain.      Patient will continue to benefit from skilled PT services to modify and progress therapeutic interventions, address functional mobility deficits, address ROM deficits, address strength deficits, analyze and address soft tissue restrictions, analyze and cue movement patterns and analyze and modify body mechanics/ergonomics to attain remaining goals. []  See Plan of Care  []  See progress note/recertification  []  See Discharge Summary         Progress towards goals / Updated goals:  Short Term Goals: To be accomplished in 3 weeks:              1. Patient will be compliant with HEP in order to maximize therapeutic benefit. Met- Pt reports compliance 6/26/19              2. Patient will be able to improve left shoulder flexion  PROM to 100 degrees in order to improve ease of future ADLs. Long Term Goals: To be accomplished in 8 weeks:              1. Patient will be able to improve left shoulder flexion PROM to 140 degrees in order to improve ease of future ADLs.             2. Patient will be able to begin AAROM exercises void of pain in order to improve ease of ADLs.                3. Patient will be able to improve FOTO score to 64 in order to demonstrate improvements in functional independence.               4. Patient will be able to improve left shoulder flexion AROM to 130 degrees in order to improve ease of reaching a shelf    PLAN  []  Upgrade activities as tolerated     [x]  Continue plan of care  []  Update interventions per flow sheet       []  Discharge due to:_  []  Other:_      Mitul Bai PTA 7/2/2019  9:38 AM    Future Appointments   Date Time Provider Mary Jane Cho   7/9/2019  9:00 AM Edwin Rivas, PTA [facility]   7/9/2019 10:15 AM JOSE ARMANDO Yoon   7/11/2019 10:00 AM Kari Leung PTA [facility]   7/16/2019  9:00 AM Jayjay Cortes, PT [facility]   7/18/2019  9:30 AM Jayjay Cortes PT [facility]   7/24/2019  9:30 AM Nicole Cheung, SNEHA [facility]

## 2019-07-05 ENCOUNTER — APPOINTMENT (OUTPATIENT)
Dept: PHYSICAL THERAPY | Age: 74
End: 2019-07-05
Payer: MEDICARE

## 2019-07-09 ENCOUNTER — HOSPITAL ENCOUNTER (OUTPATIENT)
Dept: PHYSICAL THERAPY | Age: 74
Discharge: HOME | End: 2019-07-09
Payer: MEDICARE

## 2019-07-09 ENCOUNTER — OFFICE VISIT (OUTPATIENT)
Dept: ORTHOPEDIC SURGERY | Age: 74
End: 2019-07-09

## 2019-07-09 VITALS
DIASTOLIC BLOOD PRESSURE: 88 MMHG | HEIGHT: 66 IN | HEART RATE: 62 BPM | TEMPERATURE: 97.8 F | SYSTOLIC BLOOD PRESSURE: 147 MMHG | BODY MASS INDEX: 38.47 KG/M2 | OXYGEN SATURATION: 98 % | WEIGHT: 239.4 LBS

## 2019-07-09 DIAGNOSIS — S42.252D CLOSED DISPLACED FRACTURE OF GREATER TUBEROSITY OF LEFT HUMERUS WITH ROUTINE HEALING, SUBSEQUENT ENCOUNTER: Primary | ICD-10-CM

## 2019-07-09 PROCEDURE — 97140 MANUAL THERAPY 1/> REGIONS: CPT

## 2019-07-09 PROCEDURE — 97110 THERAPEUTIC EXERCISES: CPT

## 2019-07-09 NOTE — PROGRESS NOTES
Burgess Enciso  [birth year]   Chief Complaint   Patient presents with    Shoulder Pain     left        HISTORY OF PRESENT ILLNESS  Burgess Enciso is a 75 y.o. female who presents today for evaluation of left shoulder pain. Patient fell in kitchen on 6/2/19. Today she states her pain is a 2/10. She presents in her sling. She notes she is feeling a little better. Describes shoulder as sore. Hurts if she tries to move it. Has some scapular pain today as well. Patient denies any fever, chills, chest pain, shortness of breath or calf pain. The remainder of the review of systems is negative. There are no new illness or injuries to report since last seen in the office. No changes in medications, allergies, social or family history. PHYSICAL EXAM:   Visit Vitals  /88 (BP 1 Location: Left arm, BP Patient Position: Sitting)   Pulse 62   Temp 97.8 °F (36.6 °C) (Oral)   Ht 5' 6.25\" (1.683 m)   Wt 239 lb 6.4 oz (108.6 kg)   SpO2 98%   BMI 38.35 kg/m²     The patient is a well-developed, well-nourished female   in no acute distress. The patient is alert and oriented times three. The patient is alert and oriented times three. Mood and affect are normal.  LYMPHATIC: lymph nodes are not enlarged and are within normal limits  SKIN: normal in color and non tender to palpation. There are no bruises or abrasions noted. NEUROLOGICAL: Motor sensory exam is within normal limits. Reflexes are equal bilaterally.  There is normal sensation to pinprick and light touch  MUSCULOSKELETAL:  Examination Left shoulder   Skin Intact   AC joint tenderness -   Biceps tenderness -   Forward flexion/Elevation ROM 70   Active abduction ROM 30   Glenohumeral abduction 70   External rotation ROM 10   Internal rotation ROM 10   Apprehension -   Tomass Relocation -   Jerk -   Load and Shift -   Obriens -   Speeds -   Impingement sign -   Supraspinatus/Empty Can -, 5/5   External Rotation Strength -, 5/5   Lift Off/Belly Press -, 5/5 Neurovascular Intact     No ttp       IMAGING: 3 view xray images of left shoulder  on 7/9/2019 read and reviewed by myself reveal minimal proximal displacement of greater tuberosity displacement. No change from previous xray      IMPRESSION:      ICD-10-CM ICD-9-CM    1. Closed displaced fracture of greater tuberosity of left humerus with routine healing, subsequent encounter S42.252D V54.11 AMB POC XRAY, SHOULDER; COMPLETE, 2+      REFERRAL TO PHYSICAL THERAPY        PLAN:   1. Patient with minimally proximal displaced greater tuberosity fracture s/p fall. Sling for comfort only. Ice and pain control. Stressed no active range of motion of shoulder for another 2 weeks. Can d/c sling and start arom in 2 weeks. Cont with PT. No lifitng, pushing or pulling. Will cont to monitor fragment. Given age will hold on surgical intervention unless fragment worsens. Risk factors include: age, BMI>35  2. No cortisone injection indicated today   3. YES Physical/Occupational Therapy indicated today: PROM left shoulder. Start arom in 2 weeks  4. No diagnostic test indicated today:   5. NO durable medical equipment indicated today   6. No referral indicated today   7. No medications indicated today:   8. No Narcotic indicated today     RTC 4 weeks for repeat xrays.       GEOVANY Portillo [facility]

## 2019-07-09 NOTE — PROGRESS NOTES
PT DAILY TREATMENT NOTE 10-18    Patient Name: Lluvia Schuler  Date:2019  : 1945  [x]  Patient  Verified  Payor: Chelsey Thomas / Plan: [address] [place] [address] HMO / Product Type: Managed Care Medicare /    In time:9:00  Out time:9:25  Total Treatment Time (min): 25  Visit #: 4 of 16    Medicare/BCBS Only   Total Timed Codes (min):  25 1:1 Treatment Time:  25       Treatment Area: Left shoulder pain [M25.512]  Displaced fracture of greater tuberosity of left humerus, initial encounter for closed fracture [S42.252A]    SUBJECTIVE  Pain Level (0-10 scale): 1  Any medication changes, allergies to medications, adverse drug reactions, diagnosis change, or new procedure performed?: [x] No    [] Yes (see summary sheet for update)  Subjective functional status/changes:   [] No changes reported  Pt reports shoulder feels sore today    OBJECTIVE      10 min Therapeutic Exercise:  [x] See flow sheet :   Rationale: increase ROM and increase strength to improve the patients ability to perform ADLs    15 min Manual Therapy:  PROM to left shoulder, STM to left UT/LS, infra and bicep   Rationale: decrease pain, increase ROM and increase tissue extensibility to improve functional mobility          With   [] TE   [] TA   [] neuro   [] other: Patient Education: [x] Review HEP    [] Progressed/Changed HEP based on:   [] positioning   [] body mechanics   [] transfers   [] heat/ice application    [] other:      Other Objective/Functional Measures:   PROM left shoulder flex 122*     Pain Level (0-10 scale) post treatment: 1    ASSESSMENT/Changes in Function: Pt performed well with therex, PROM limited by pain but has improved since SOC.  Cont progressing per protocol    Patient will continue to benefit from skilled PT services to modify and progress therapeutic interventions, address functional mobility deficits, address ROM deficits, address strength deficits, analyze and address soft tissue restrictions, analyze and cue movement patterns, analyze and modify body mechanics/ergonomics and assess and modify postural abnormalities to attain remaining goals. []  See Plan of Care  []  See progress note/recertification  []  See Discharge Summary         Progress towards goals / Updated goals:  Short Term Goals: To be accomplished in 3 weeks:              1. Patient will be compliant with HEP in order to maximize therapeutic benefit.   Met- Pt reports compliance 6/26/19              2. Patient will be able to improve left shoulder flexion  PROM to 100 degrees in order to improve ease of future ADLs. Met PROM shoulder flex 122* 7/9/19  Long Term Goals: To be accomplished in 8 weeks:              1. Patient will be able to improve left shoulder flexion PROM to 140 degrees in order to improve ease of future ADLs.             2. Patient will be able to begin AAROM exercises void of pain in order to improve ease of ADLs.                3. Patient will be able to improve FOTO score to 64 in order to demonstrate improvements in functional independence.               4. Patient will be able to improve left shoulder flexion AROM to 130 degrees in order to improve ease of reaching a shelf      PLAN  []  Upgrade activities as tolerated     [x]  Continue plan of care  []  Update interventions per flow sheet       []  Discharge due to:_  []  Other:_      Rafael Le PTA 7/9/2019  8:48 AM    Future Appointments   Date Time Provider Mary Jane Cho   7/9/2019  9:00 AM Fili Almanza, PTA [facility]   7/9/2019 10:15 AM JOSE ARMANDO Garrido [facility] DAVID LINDO   7/11/2019 10:00 AM Ras Mccullough, PTA [facility]   7/16/2019  9:00 AM Lennie Ruiz, PT [facility]   7/18/2019  9:30 AM Lennie Ruiz, PT [facility]   7/24/2019  9:30 AM Erik Bender, SNEHA [facility]

## 2019-07-09 NOTE — PATIENT INSTRUCTIONS
You may remove your sling in 2 weeks. You may then begin to move your arm on your own in 2 weeks. Continue with no lifting, pushing or pulling until you are seen again in 1 month. Remember nothing causes an increase in pain including physical therapy.

## 2019-07-11 ENCOUNTER — HOSPITAL ENCOUNTER (OUTPATIENT)
Dept: PHYSICAL THERAPY | Age: 74
Discharge: HOME | End: 2019-07-11
Payer: MEDICARE

## 2019-07-11 PROCEDURE — 97140 MANUAL THERAPY 1/> REGIONS: CPT

## 2019-07-11 PROCEDURE — 97110 THERAPEUTIC EXERCISES: CPT

## 2019-07-11 NOTE — PROGRESS NOTES
PT DAILY TREATMENT NOTE 10-18    Patient Name: Oleg Mireles  Date:2019  : 1945  [x]  Patient  Verified  Payor: Kirill Omar / Plan: [address] [place] [address] HMO / Product Type: Managed Care Medicare /    In time:952  Out time:1021  Total Treatment Time (min): 29  Visit #: 5 of 16    Medicare/BCBS Only   Total Timed Codes (min):  29 1:1 Treatment Time:  29       Treatment Area: Left shoulder pain [M25.512]  Displaced fracture of greater tuberosity of left humerus, initial encounter for closed fracture [S42.252A]    SUBJECTIVE  Pain Level (0-10 scale): 2  Any medication changes, allergies to medications, adverse drug reactions, diagnosis change, or new procedure performed?: [x] No    [] Yes (see summary sheet for update)  Subjective functional status/changes:   [] No changes reported  Patient reported some posterior shoulder pain and new orders from MD to start AROM in 2 weeks    OBJECTIVE      19 min Therapeutic Exercise:  [] See flow sheet :   Rationale: increase ROM and increase strength to improve the patients ability to perform ADLs    10 min Manual Therapy:  TPR left UT, LS and subscap, STJ and GHJ and PROM Left shoulder   Rationale: decrease pain, increase ROM and increase tissue extensibility to perform ADLs          With   [] TE   [] TA   [] neuro   [] other: Patient Education: [x] Review HEP    [] Progressed/Changed HEP based on:   [] positioning   [] body mechanics   [] transfers   [] heat/ice application    [] other:      Other Objective/Functional Measures: initiated AAROM therex today in prep for MD orders for patient to begin AROM in 2 weeks (approx 19). Pain Level (0-10 scale) post treatment: 1    ASSESSMENT/Changes in Function: patient tolerated AAROM therex without c/o increased pain.     Patient will continue to benefit from skilled PT services to modify and progress therapeutic interventions, address functional mobility deficits, address ROM deficits, address strength deficits and analyze and cue movement patterns to attain remaining goals. [x]  See Plan of Care  []  See progress note/recertification  []  See Discharge Summary         Progress towards goals / Updated goals:  Short Term Goals: To be accomplished in 3 weeks:              1. Patient will be compliant with HEP in order to maximize therapeutic benefit.   Met- Pt reports compliance 6/26/19              2. Patient will be able to improve left shoulder flexion  PROM to 100 degrees in order to improve ease of future ADLs. Met PROM shoulder flex 122* 7/9/19  Long Term Goals: To be accomplished in 8 weeks:              1. Patient will be able to improve left shoulder flexion PROM to 140 degrees in order to improve ease of future ADLs.             2. Patient will be able to begin AAROM exercises void of pain in order to improve ease of ADLs.                3. Patient will be able to improve FOTO score to 64 in order to demonstrate improvements in functional independence.               4. Patient will be able to improve left shoulder flexion AROM to 130 degrees in order to improve ease of reaching a shelf           PLAN  []  Upgrade activities as tolerated     [x]  Continue plan of care  []  Update interventions per flow sheet       []  Discharge due to:_  []  Other:_      Olga Patches, PTA 7/11/2019  10:21 AM    Future Appointments   Date Time Provider aMry Jane Cho   7/16/2019  9:00 AM Augustine Faustin PT [facility]   7/19/2019 11:30 AM Augustine Faustin PT [facility]   7/24/2019  9:30 AM Francisca IZAGUIRRE NP [facility]   8/6/2019 10:[place] [address] [facility]

## 2019-07-16 ENCOUNTER — HOSPITAL ENCOUNTER (OUTPATIENT)
Dept: PHYSICAL THERAPY | Age: 74
Discharge: HOME | End: 2019-07-16
Payer: MEDICARE

## 2019-07-16 PROCEDURE — 97110 THERAPEUTIC EXERCISES: CPT

## 2019-07-16 PROCEDURE — 97140 MANUAL THERAPY 1/> REGIONS: CPT

## 2019-07-16 NOTE — PROGRESS NOTES
PT DAILY TREATMENT NOTE 10-18    Patient Name: Anson Cruz  Date:2019  : 1945  [x]  Patient  Verified  Payor: Brissa Sotos / Plan: [address] [place] [address] HMO / Product Type: Managed Care Medicare /    In time:8:57  Out time:9:29  Total Treatment Time (min): 32  Visit #: 6 of 16    Medicare/BCBS Only   Total Timed Codes (min):  32 1:1 Treatment Time:  32       Treatment Area: Left shoulder pain [M25.512]  Displaced fracture of greater tuberosity of left humerus, initial encounter for closed fracture [S42.252A]    SUBJECTIVE  Pain Level (0-10 scale): 2  Any medication changes, allergies to medications, adverse drug reactions, diagnosis change, or new procedure performed?: [x] No    [] Yes (see summary sheet for update)  Subjective functional status/changes:   [] No changes reported  \"I am doing okay today\". OBJECTIVE      22 min Therapeutic Exercise:  [x] See flow sheet :   Rationale: increase ROM and increase strength to improve the patients ability to perform ADLs. 10 min Manual Therapy:  Left shoulder PROM, STM left UT/LS, gentle inferior GHJ mobs   Rationale: increase ROM and increase tissue extensibility to improve ease of ADLs. With   [] TE   [] TA   [] neuro   [] other: Patient Education: [x] Review HEP    [] Progressed/Changed HEP based on:   [] positioning   [] body mechanics   [] transfers   [] heat/ice application    [] other:      Other Objective/Functional Measures:      Pain Level (0-10 scale) post treatment: 1    ASSESSMENT/Changes in Function: Good tolerance to activity today. Increased guarding noted during PROM today, limited flexion and abduction passively. Plan to continue progressing per protocol.      Patient will continue to benefit from skilled PT services to modify and progress therapeutic interventions, address functional mobility deficits, address ROM deficits, address strength deficits, analyze and address soft tissue restrictions, analyze and cue movement patterns and analyze and modify body mechanics/ergonomics to attain remaining goals. [x]  See Plan of Care  []  See progress note/recertification  []  See Discharge Summary         Progress towards goals / Updated goals:  Short Term Goals: To be accomplished in 3 weeks:              1. Patient will be compliant with HEP in order to maximize therapeutic benefit.   Met- Pt reports compliance 6/26/19              2. Patient will be able to improve left shoulder flexion  PROM to 100 degrees in order to improve ease of future ADLs. Met PROM shoulder flex 122* 7/9/19  Long Term Goals: To be accomplished in 8 weeks:              1. Patient will be able to improve left shoulder flexion PROM to 140 degrees in order to improve ease of future ADLs.             2. Patient will be able to begin AAROM exercises void of pain in order to improve ease of ADLs.                3. Patient will be able to improve FOTO score to 64 in order to demonstrate improvements in functional independence.               4. Patient will be able to improve left shoulder flexion AROM to 130 degrees in order to improve ease of reaching a shelf       PLAN  []  Upgrade activities as tolerated     [x]  Continue plan of care  []  Update interventions per flow sheet       []  Discharge due to:_  []  Other:_      Go Hobbs, PT 7/16/2019  9:00 AM    Future Appointments   Date Time Provider Mary Jane Cho   7/19/2019 11:30 AM Forrest Meek, PT [facility]   7/24/2019  9:30 AM Symone IZAGUIRRE NP [facility]   8/6/2019 10:15 AM Michelle Reece PA [address]

## 2019-07-18 ENCOUNTER — APPOINTMENT (OUTPATIENT)
Dept: PHYSICAL THERAPY | Age: 74
End: 2019-07-18
Payer: MEDICARE

## 2019-07-19 ENCOUNTER — HOSPITAL ENCOUNTER (OUTPATIENT)
Dept: PHYSICAL THERAPY | Age: 74
Discharge: HOME | End: 2019-07-19
Payer: MEDICARE

## 2019-07-19 PROCEDURE — 97140 MANUAL THERAPY 1/> REGIONS: CPT

## 2019-07-19 PROCEDURE — 97110 THERAPEUTIC EXERCISES: CPT

## 2019-07-19 NOTE — PROGRESS NOTES
[facility]  [address]  [place], [address]  [phone] fax    Medicare Progress Report    Patient name: Randy Kebede Start of Care: 2019   Referral source: Jaylin Duarte, [address] : 1945               Medical Diagnosis: Left shoulder pain [M25.512]  Displaced fracture of greater tuberosity of left humerus, initial encounter for closed fracture [S42.252A]  Payor: Lashae Cameron / Plan: [address] [place] [address] HMO / Product Type: [employer] Care Medicare /  Onset Date:19               Treatment Diagnosis: Left shoulder pain s/p left proximal humerus fracture   Prior Hospitalization: see medical history Provider#: [id number]   Medications: Verified on Patient summary List    Comorbidities: arthritis, COPD/emphysema   Prior Level of Function: (I) with ADLs. Visits from Start of Care: 7    Missed Visits: 0    Reporting Period: 19 to 19    Subjective Reports: Patient reports no new complaints. Key functional changes:     Short Term Goals: To be accomplished in 3 weeks:              1. Patient will be compliant with HEP in order to maximize therapeutic benefit.   Met- Pt reports compliance 19              2. Patient will be able to improve left shoulder flexion  PROM to 100 degrees in order to improve ease of future ADLs. Met PROM shoulder flex 122* 19  Long Term Goals: To be accomplished in 8 weeks:              1. Patient will be able to improve left shoulder flexion PROM to 140 degrees in order to improve ease of future ADLs. Left shoulder flexion PROM - 127 degrees (19)              2. Patient will be able to begin AAROM exercises void of pain in order to improve ease of ADLs. Progressing - initiated pulleys, patient reports no pain just tightness (19).             3. Patient will be able to improve FOTO score to 64 in order to demonstrate improvements in functional independence.  FOTO score - 54 (7/19/19).             4. Patient will be able to improve left shoulder flexion AROM to 130 degrees in order to improve ease of reaching a shelf Not begun AROM yet     Problems/ barriers to goal attainment: none at this time     Assessment / Recommendations:Patient is progressing well per protocol and demonstrates good tolerance to all therex. Patient remains compliant with HEP. Plan to progress AAROM into more AROM exercises per recent order from MD.     Problem List: pain affecting function, decrease ROM, decrease strength, decrease ADL/ functional abilitiies, decrease activity tolerance and decrease flexibility/ joint mobility   Treatment Plan: Therapeutic exercise, Neuromuscular re-education, Physical agent/modality, Manual therapy, Patient education and Functional mobility training    Patient Goal (s) has been updated and includes: Improve AAROM/AROM, improve functional indpeendence and ease of ADLs. Updated Goals to be accomplished in 4 weeks:  1. Patient will be able to improve left shoulder flexion PROM to 140 degrees in order to improve ease of future ADLs. Left shoulder flexion PROM - 127 degrees (7/19/19)   2. Patient will be able to begin AAROM exercises void of pain in order to improve ease of ADLs. Progressing - initiated pulleys, patient reports no pain just tightness (7/19/19).      3. Patient will be able to improve FOTO score to 64 in order to demonstrate improvements in functional independence. FOTO score - 54 (7/19/19).    4. Patient will be able to improve right shoulder flexion AAROM to 140 degrees in order to improve functional mobility.      5. Patient will be able to improve left shoulder flexion AROM to 130 degrees in order to improve ease of reaching a shelf Not begun AROM yet       Frequency / Duration: Patient to be seen 2 times per week for 4 weeks:      Mani Knapp, PT 7/19/2019 12:05 PM

## 2019-07-19 NOTE — PROGRESS NOTES
PT DAILY TREATMENT NOTE 10-18    Patient Name: Sarah Ruelas  Date:2019  : 1945  [x]  Patient  Verified  Payor: Coral Naranjo / Plan: [address] [place] [address] HMO / Product Type: Managed Care Medicare /    In time:11:28  Out time:12:00  Total Treatment Time (min): 32  Visit #: 7 of 16    Medicare/BCBS Only   Total Timed Codes (min):  32 1:1 Treatment Time:  32       Treatment Area: Left shoulder pain [M25.512]  Displaced fracture of greater tuberosity of left humerus, initial encounter for closed fracture [S42.252A]    SUBJECTIVE  Pain Level (0-10 scale): 2  Any medication changes, allergies to medications, adverse drug reactions, diagnosis change, or new procedure performed?: [x] No    [] Yes (see summary sheet for update)  Subjective functional status/changes:   [x] No changes reported      OBJECTIVE        22 min Therapeutic Exercise:  [x] See flow sheet :   Rationale: increase ROM and increase strength to improve the patients ability to perform ADLs. 10 min Manual Therapy:   Left shoulder PROM, STM left UT/LS, gentle inferior GHJ mobs   Rationale: increase ROM and increase tissue extensibility to improve patients functional mobility. With   [] TE   [] TA   [] neuro   [] other: Patient Education: [x] Review HEP    [] Progressed/Changed HEP based on:   [] positioning   [] body mechanics   [] transfers   [] heat/ice application    [] other:      Other Objective/Functional Measures:  Left shoulder flexion PROM - 127 degrees. FOTO score - 54. Pain Level (0-10 scale) post treatment: 1    ASSESSMENT/Changes in Function: Patient is progressing well per protocol and demonstrates good tolerance to all therex. Patient remains compliant with HEP.  Plan to progress AAROM into more AROM exercises per recent order from MD.     Patient will continue to benefit from skilled PT services to modify and progress therapeutic interventions, address functional mobility deficits, address ROM deficits, address strength deficits, analyze and address soft tissue restrictions, analyze and cue movement patterns and analyze and modify body mechanics/ergonomics to attain remaining goals. [x]  See Plan of Care  []  See progress note/recertification  []  See Discharge Summary         Progress towards goals / Updated goals:  Short Term Goals: To be accomplished in 3 weeks:              1. Patient will be compliant with HEP in order to maximize therapeutic benefit.   Met- Pt reports compliance 6/26/19              2. Patient will be able to improve left shoulder flexion  PROM to 100 degrees in order to improve ease of future ADLs. Met PROM shoulder flex 122* 7/9/19  Long Term Goals: To be accomplished in 8 weeks:              1. Patient will be able to improve left shoulder flexion PROM to 140 degrees in order to improve ease of future ADLs. Left shoulder flexion PROM - 127 degrees (7/19/19)              2. Patient will be able to begin AAROM exercises void of pain in order to improve ease of ADLs. Progressing - initiated pulleys, patient reports no pain just tightness (7/19/19).             3. Patient will be able to improve FOTO score to 64 in order to demonstrate improvements in functional independence. FOTO score - 54 (7/19/19).    [id number]. Patient will be able to improve left shoulder flexion AROM to 130 degrees in order to improve ease of reaching a shelf Not begun AROM yet    PLAN  []  Upgrade activities as tolerated     [x]  Continue plan of care  []  Update interventions per flow sheet       []  Discharge due to:_  []  Other:_      Guille Youngblood, PT 7/19/2019  11:29 AM    Future Appointments   Date Time Provider Mary Jane Cho   7/19/2019 11:30 AM Eric Peres, PT [facility]   7/24/2019  9:30 AM Chance IZAGUIRRE NP [facility]   8/6/2019 10:15 AM Mariya Watters PA [address]

## 2019-07-26 ENCOUNTER — HOSPITAL ENCOUNTER (OUTPATIENT)
Dept: PHYSICAL THERAPY | Age: 74
Discharge: HOME | End: 2019-07-26
Payer: MEDICARE

## 2019-07-26 PROCEDURE — 97110 THERAPEUTIC EXERCISES: CPT

## 2019-07-26 PROCEDURE — 97140 MANUAL THERAPY 1/> REGIONS: CPT

## 2019-07-26 NOTE — PROGRESS NOTES
PT DAILY TREATMENT NOTE 10-18    Patient Name: Alondra Terry  Date:2019  : 1945  [x]  Patient  Verified  Payor: Jorge Luis Penningtonmahoganyjacquelyn / Plan: [address] [place] [address] HMO / Product Type: Managed Care Medicare /    In time:8:58  Out time:9:28  Total Treatment Time (min): 30  Visit #: 1 of 8    Medicare/BCBS Only   Total Timed Codes (min):  30 1:1 Treatment Time:  30       Treatment Area: Left shoulder pain [M25.512]  Displaced fracture of greater tuberosity of left humerus, initial encounter for closed fracture [S42.252A]    SUBJECTIVE  Pain Level (0-10 scale): 2  Any medication changes, allergies to medications, adverse drug reactions, diagnosis change, or new procedure performed?: [x] No    [] Yes (see summary sheet for update)  Subjective functional status/changes:   [] No changes reported  Pt reports not being able to sleep well because of shoulder pain    OBJECTIVE    20 min Therapeutic Exercise:  [x] See flow sheet :   Rationale: increase ROM and increase strength to improve the patients ability to perform ADLs    10 min Manual Therapy:  Left shoulder PROM with gentle inf GH jt mobs   Rationale: decrease pain, increase ROM and increase tissue extensibility to improve functional mobility          With   [] TE   [] TA   [] neuro   [] other: Patient Education: [x] Review HEP    [] Progressed/Changed HEP based on:   [] positioning   [] body mechanics   [] transfers   [] heat/ice application    [] other:      Other Objective/Functional Measures: Added supine wand flex, abd, ER     Pain Level (0-10 scale) post treatment: 3    ASSESSMENT/Changes in Function: Progressing AAROM exercises, raising wand is challenging due to shoulder weakness. Cont's to report difficulty sleeping due to pain.      Patient will continue to benefit from skilled PT services to modify and progress therapeutic interventions, address functional mobility deficits, address ROM deficits, address strength deficits, analyze and address soft tissue restrictions, analyze and cue movement patterns, analyze and modify body mechanics/ergonomics and assess and modify postural abnormalities to attain remaining goals. []  See Plan of Care  []  See progress note/recertification  []  See Discharge Summary         Progress towards goals / Updated goals:  Updated Goals to be accomplished in 4 weeks:  1. Patient will be able to improve left shoulder flexion PROM to 140 degrees in order to improve ease of future ADLs. Left shoulder flexion PROM - 127 degrees (7/19/19)   2. Patient will be able to begin AAROM exercises void of pain in order to improve ease of ADLs. Progressing - initiated pulleys, patient reports no pain just tightness (7/19/19).     Fitz.Hitch. Patient will be able to improve FOTO score to 64 in order to demonstrate improvements in functional independence.  FOTO score - 54 (7/19/19).   4. Patient will be able to improve right shoulder flexion AAROM to 140 degrees in order to improve functional mobility.      5. Patient will be able to improve left shoulder flexion AROM to 130 degrees in order to improve ease of reaching a shelf Not begun AROM yet     PLAN  []  Upgrade activities as tolerated     [x]  Continue plan of care  []  Update interventions per flow sheet       []  Discharge due to:_  []  Other:_      Malina Valladares PTA 7/26/2019  8:51 AM    Future Appointments   Date Time Provider Mary Jane Cho   7/26/2019  9:00 AM [facility]   7/30/2019  9:30 AM [facility]   8/1/2019 10:30 AM Tyler Vela PTA [facility]   8/6/2019  9:30 AM [facility]   8/6/2019 10:15 AM JOSE ARMANDO Randall [facility]   8/8/2019  9:30 AM Tyler Vela PTA [facility]   8/13/2019  9:30 AM [facility]   8/15/2019  9:00 AM Tyler Vela PTA [facility]   8/20/2019  9:30 AM Tyler Vela PTA [facility]   9/12/2019 10:30 AM Jessica Solomon MD [facility] [address]

## 2019-07-30 ENCOUNTER — HOSPITAL ENCOUNTER (OUTPATIENT)
Dept: PHYSICAL THERAPY | Age: 74
Discharge: HOME | End: 2019-07-30
Payer: MEDICARE

## 2019-07-30 PROCEDURE — 97110 THERAPEUTIC EXERCISES: CPT

## 2019-07-30 PROCEDURE — 97140 MANUAL THERAPY 1/> REGIONS: CPT

## 2019-07-30 NOTE — PROGRESS NOTES
PT DAILY TREATMENT NOTE 10-18    Patient Name: Oleg Mireles  Date:2019  : 1945  [x]  Patient  Verified  Payor: Kirill Omar / Plan: [address] [place] [address] HMO / Product Type: Managed Care Medicare /    In time:9:30  Out time:10:00  Total Treatment Time (min): 30  Visit #: 2 of 8    Medicare/BCBS Only   Total Timed Codes (min):  30 1:1 Treatment Time:  23       Treatment Area: Left shoulder pain [M25.512]  Displaced fracture of greater tuberosity of left humerus, initial encounter for closed fracture [S42.252A]    SUBJECTIVE  Pain Level (0-10 scale): 3  Any medication changes, allergies to medications, adverse drug reactions, diagnosis change, or new procedure performed?: [x] No    [] Yes (see summary sheet for update)  Subjective functional status/changes:   [] No changes reported  Pt reports her left shoulder has been in a little more pain than usual and she is not sure why. OBJECTIVE      21 min Therapeutic Exercise:  [x] See flow sheet :   Rationale: increase ROM and increase strength to improve the patients ability to reach overhead without pain. 9 min Manual Therapy:  PROM Left shoulder, gentle inf GH joint mobs   Rationale: decrease pain, increase ROM and increase tissue extensibility to improve functional mobility. With   [] TE   [] TA   [] neuro   [] other: Patient Education: [x] Review HEP    [] Progressed/Changed HEP based on:   [] positioning   [] body mechanics   [] transfers   [] heat/ice application    [] other:      Other Objective/Functional Measures:      Pain Level (0-10 scale) post treatment: 3    ASSESSMENT/Changes in Function:   Pt continues to display difficulty with wand exercises due to pain and left shoulder weakness. Pt demonstrates limited PROM shoulder abduction and is guarded during manual requiring vc to relax.     Patient will continue to benefit from skilled PT services to modify and progress therapeutic interventions, address functional mobility deficits, address ROM deficits, address strength deficits, analyze and address soft tissue restrictions, analyze and cue movement patterns, analyze and modify body mechanics/ergonomics and assess and modify postural abnormalities to attain remaining goals. [x]  See Plan of Care  []  See progress note/recertification  []  See Discharge Summary         Progress towards goals / Updated goals:   Updated Goals to be accomplished in 4 weeks:  1. Patient will be able to improve left shoulder flexion PROM to 140 degrees in order to improve ease of future ADLs. Left shoulder flexion PROM - 127 degrees (7/19/19)   2. Patient will be able to begin AAROM exercises void of pain in order to improve ease of ADLs. Progressing - initiated pulleys, patient reports no pain just tightness (7/19/19).     Elija.Nephew. Patient will be able to improve FOTO score to 64 in order to demonstrate improvements in functional independence. FOTO score - 54 (7/19/19).   4. Patient will be able to improve right shoulder flexion AAROM to 140 degrees in order to improve functional mobility.     Roch.Caper. Patient will be able to improve left shoulder flexion AROM to 130 degrees in order to improve ease of reaching a shelf Not begun AROM yet     PLAN  []  Upgrade activities as tolerated     [x]  Continue plan of care  []  Update interventions per flow sheet       []  Discharge due to:_  []  Other:_      Saniya Hawkins PTA 7/30/2019  8:15 AM    Future Appointments   Date Time Provider Mary Jane Cho   7/30/2019  9:30 AM Mariana Soler PTA [facility]   8/1/2019 10:30 AM Ashley Raygoza PTA [facility]   8/6/2019  9:30 AM Mariana Soler PTA [facility]   8/6/2019 10:15 AM JOSE ARMANDO Dean [facility] SCHED   8/8/2019  9:30 AM Ashley Raygoza PTA [facility]   8/13/2019  9:30 AM Mariana Soler PTA [facility]   8/15/2019  9:00 AM Ashley Raygoza PTA [facility]   8/20/2019  9:30 AM Ashley Raygoza PTA [facility]   9/12/2019 10:30 YORDAN Godinez MD [facility]

## 2019-08-01 ENCOUNTER — HOSPITAL ENCOUNTER (OUTPATIENT)
Dept: PHYSICAL THERAPY | Age: 74
Discharge: HOME | End: 2019-08-01
Payer: MEDICARE

## 2019-08-01 PROCEDURE — 97140 MANUAL THERAPY 1/> REGIONS: CPT

## 2019-08-01 PROCEDURE — 97110 THERAPEUTIC EXERCISES: CPT

## 2019-08-01 NOTE — PROGRESS NOTES
PT DAILY TREATMENT NOTE 10-18    Patient Name: Paolo Rendon  Date:2019  : 1945  [x]  Patient  Verified  Payor: Jed Browndock / Plan: [address] [place] [address] HMO / Product Type: Managed Care Medicare /    In time:10:30  Out time:10:56  Total Treatment Time (min): 26  Visit #: 3 of 8    Medicare/BCBS Only   Total Timed Codes (min):  26 1:1 Treatment Time:         Treatment Area: Left shoulder pain [M25.512]  Displaced fracture of greater tuberosity of left humerus, initial encounter for closed fracture [S42.252A]    SUBJECTIVE  Pain Level (0-10 scale): 2/10  Any medication changes, allergies to medications, adverse drug reactions, diagnosis change, or new procedure performed?: [x] No    [] Yes (see summary sheet for update)  Subjective functional status/changes:   [] No changes reported  Pt reports no new complaints of pain. Pt reports compliance with HEP. OBJECTIVE    18 min Therapeutic Exercise:  [] See flow sheet :   Rationale: increase ROM and increase strength to improve the patients ability to tolerate ADLs. 8 min Manual Therapy:  PROM to left shoulder   Rationale: decrease pain, increase ROM and increase tissue extensibility to improve tolerance to ADLs. With   [] TE   [] TA   [] neuro   [] other: Patient Education: [x] Review HEP    [] Progressed/Changed HEP based on:   [] positioning   [] body mechanics   [] transfers   [] heat/ice application    [] other:      Other Objective/Functional Measures: Pain with end range shoulder PROM all planes. Pain Level (0-10 scale) post treatment: 2/10    ASSESSMENT/Changes in Function: Pt has continued slow progress toward functional goals. Pt has improved shoulder flexion.      Patient will continue to benefit from skilled PT services to modify and progress therapeutic interventions, address functional mobility deficits, address ROM deficits, address strength deficits, analyze and address soft tissue restrictions, analyze and cue movement patterns and analyze and modify body mechanics/ergonomics to attain remaining goals. []  See Plan of Care  []  See progress note/recertification  []  See Discharge Summary         Progress towards goals / Updated goals:   Updated Goals to be accomplished in 4 weeks:  1. Patient will be able to improve left shoulder flexion PROM to 140 degrees in order to improve ease of future ADLs. Left shoulder flexion PROM - 127 degrees (7/19/19)   2. Patient will be able to begin AAROM exercises void of pain in order to improve ease of ADLs. Progressing - initiated pulleys, patient reports no pain just tightness (7/19/19).     Jignesh.Dines. Patient will be able to improve FOTO score to 64 in order to demonstrate improvements in functional independence. FOTO score - 54 (7/19/19).   4. Patient will be able to improve right shoulder flexion AAROM to 140 degrees in order to improve functional mobility.     Natasha. Patient will be able to improve left shoulder flexion AROM to 130 degrees in order to improve ease of reaching a shelf Not begun AROM yet      PLAN  []  Upgrade activities as tolerated     [x]  Continue plan of care  []  Update interventions per flow sheet       []  Discharge due to:_  []  Other:_      Yane Almaraz, PTA 8/1/2019  11:36 AM    Future Appointments   Date Time Provider Mary Jane Cho   8/6/2019  9:30 AM Ethyl Jointer, PTA [facility]   8/6/2019 10:15 AM JOSE ARMANDO [facility] SCHED   8/8/2019  9:30 AM Roberunradha Bicgoods, PTA [facility]   8/13/2019  9:30 AM Ethyl Jointer, PTA [facility]   8/15/2019  9:00 AM Jaunita Bickers, PTA [facility]   8/20/2019  9:30 AM Roberunita Jemals, PTA [facility]   9/12/2019 10:30 AM Clayton Vera MD [facility]

## 2019-08-06 ENCOUNTER — HOSPITAL ENCOUNTER (OUTPATIENT)
Dept: PHYSICAL THERAPY | Age: 74
Discharge: HOME | End: 2019-08-06
Payer: MEDICARE

## 2019-08-06 ENCOUNTER — OFFICE VISIT (OUTPATIENT)
Dept: ORTHOPEDIC SURGERY | Age: 74
End: 2019-08-06

## 2019-08-06 VITALS
HEART RATE: 69 BPM | TEMPERATURE: 97.2 F | HEIGHT: 66 IN | SYSTOLIC BLOOD PRESSURE: 141 MMHG | BODY MASS INDEX: 38.57 KG/M2 | WEIGHT: 240 LBS | DIASTOLIC BLOOD PRESSURE: 54 MMHG | OXYGEN SATURATION: 97 %

## 2019-08-06 DIAGNOSIS — S42.252D CLOSED DISPLACED FRACTURE OF GREATER TUBEROSITY OF LEFT HUMERUS WITH ROUTINE HEALING, SUBSEQUENT ENCOUNTER: Primary | ICD-10-CM

## 2019-08-06 DIAGNOSIS — M25.512 LEFT SHOULDER PAIN, UNSPECIFIED CHRONICITY: ICD-10-CM

## 2019-08-06 PROCEDURE — 97140 MANUAL THERAPY 1/> REGIONS: CPT

## 2019-08-06 PROCEDURE — 97110 THERAPEUTIC EXERCISES: CPT

## 2019-08-06 NOTE — PROGRESS NOTES
PT DAILY TREATMENT NOTE 10-18    Patient Name: Iggy Vee  Date:2019  : 1945  [x]  Patient  Verified  Payor: Kori Florez / Plan: [address] [place] [address] HMO / Product Type: Managed Care Medicare /    In time:9:34  Out time:10:07  Total Treatment Time (min): 33  Visit #: 4 of 8    Medicare/BCBS Only   Total Timed Codes (min):  33 1:1 Treatment Time:  28       Treatment Area: Left shoulder pain [M25.512]  Displaced fracture of greater tuberosity of left humerus, initial encounter for closed fracture [S42.252A]    SUBJECTIVE  Pain Level (0-10 scale): 1-2  Any medication changes, allergies to medications, adverse drug reactions, diagnosis change, or new procedure performed?: [x] No    [] Yes (see summary sheet for update)  Subjective functional status/changes:   [] No changes reported  Pt reports some pain in the front of her left arm this morning stating usually the pain is at the top of the shoulder. OBJECTIVE      25 min Therapeutic Exercise:  [x] See flow sheet :   Rationale: increase ROM and increase strength to improve the patients ability to perform ADL's.    8 min Manual Therapy:  PROM L shoulder   Rationale: decrease pain, increase ROM and increase tissue extensibility to improve functional mobility. With   [] TE   [] TA   [] neuro   [] other: Patient Education: [x] Review HEP    [] Progressed/Changed HEP based on:   [] positioning   [] body mechanics   [] transfers   [] heat/ice application    [] other:      Other Objective/Functional Measures:     Pain Level (0-10 scale) post treatment: 3    ASSESSMENT/Changes in Function:   Improved ROM since [facility] with pt noting the ability to use her left UE to wash her hair now. Increased PROM by end of manual with flex and abd.      Patient will continue to benefit from skilled PT services to modify and progress therapeutic interventions, address functional mobility deficits, address ROM deficits, address strength deficits, analyze and address soft tissue restrictions, analyze and cue movement patterns, analyze and modify body mechanics/ergonomics and assess and modify postural abnormalities to attain remaining goals. [x]  See Plan of Care  []  See progress note/recertification  []  See Discharge Summary         Progress towards goals / Updated goals:  Updated Goals to be accomplished in 4 weeks:  1. Patient will be able to improve left shoulder flexion PROM to 140 degrees in order to improve ease of future ADLs. Progressing-Left shoulder flexion PROM - 132 degrees (8/6/19)   2. Patient will be able to begin AAROM exercises void of pain in order to improve ease of ADLs. Progressing - initiated pulleys, patient reports no pain just tightness (7/19/19).     Dashiel.Burner. Patient will be able to improve FOTO score to 64 in order to demonstrate improvements in functional independence. FOTO score - 54 (7/19/19).   4. Patient will be able to improve right shoulder flexion AAROM to 140 degrees in order to improve functional mobility.     Saundra.Lighter. Patient will be able to improve left shoulder flexion AROM to 130 degrees in order to improve ease of reaching a shelf Not begun AROM yet     PLAN  []  Upgrade activities as tolerated     [x]  Continue plan of care  []  Update interventions per flow sheet       []  Discharge due to:_  []  Other:_      Haroon Shirley PTA 8/6/2019  8:54 AM    Future Appointments   Date Time Provider Mary Jane Cho   8/6/2019  9:30 AM Daryl Cifuentes PTA [facility]   8/6/2019 10:15 AM JOSE ARMANDO [facility] SCHED   8/8/2019  9:30 AM [facility]   8/13/2019  9:30 AM [facility]   8/15/2019  9:00 AM [facility]   8/20/2019  9:30 AM [facility]   9/12/2019 10:30 AM Maylin Adams MD [facility]

## 2019-08-06 NOTE — PROGRESS NOTES
Oleg Mireles  [birth year]   Chief Complaint   Patient presents with    Shoulder Pain     left        HISTORY OF PRESENT ILLNESS  Oleg Mireles is a 75 y.o. female who presents today for evaluation of left shoulder pain. Patient fell in kitchen on 6/2/19. Today she states her pain is a 2/10. She feels like pain is better but would like to be able to move her arm more. Has been out of sling x 2 weeks. Patient denies any fever, chills, chest pain, shortness of breath or calf pain. The remainder of the review of systems is negative. There are no new illness or injuries to report since last seen in the office. No changes in medications, allergies, social or family history. PHYSICAL EXAM:   Visit Vitals  /54   Pulse 69   Temp 97.2 °F (36.2 °C) (Oral)   Ht 5' 6.25\" (1.683 m)   Wt 240 lb (108.9 kg)   SpO2 97%   BMI 38.45 kg/m²     The patient is a well-developed, well-nourished female   in no acute distress. The patient is alert and oriented times three. The patient is alert and oriented times three. Mood and affect are normal.  LYMPHATIC: lymph nodes are not enlarged and are within normal limits  SKIN: normal in color and non tender to palpation. There are no bruises or abrasions noted. NEUROLOGICAL: Motor sensory exam is within normal limits. Reflexes are equal bilaterally.  There is normal sensation to pinprick and light touch  MUSCULOSKELETAL:  Examination Left shoulder   Skin Intact   AC joint tenderness -   Biceps tenderness -   Forward flexion/Elevation ROM 70   Active abduction ROM 30   Glenohumeral abduction 70   External rotation ROM 10   Internal rotation ROM 10   Apprehension -   Tomass Relocation -   Jerk -   Load and Shift -   Obriens -   Speeds -   Impingement sign -   Supraspinatus/Empty Can -, 5/5   External Rotation Strength -, 5/5   Lift Off/Belly Press -, 5/5   Neurovascular Intact     No ttp       IMAGING: 3 view xray images of left shoulder  on 8/6/2019 read and reviewed by myself reveal minimal proximal displacement of greater tuberosity displacement. Callus formation seen     IMPRESSION:      ICD-10-CM ICD-9-CM    1. Left shoulder pain, unspecified chronicity M25.512 719.41 AMB POC XRAY, SHOULDER; COMPLETE, 2+   2. Closed displaced fracture of greater tuberosity of left humerus with routine healing, subsequent encounter S42.252D V54.11         PLAN:   1. Patient with minimally proximal displaced greater tuberosity fracture s/p fall. Will cont with PT ok for arom. Given age will hold on surgical intervention unless fragment worsens. Risk factors include: age, BMI>35  2. No cortisone injection indicated today   3. YES Physical/Occupational Therapy indicated today: AROM left shoulder. 4. No diagnostic test indicated today:   5. NO durable medical equipment indicated today   6. No referral indicated today   7. No medications indicated today:   8. No Narcotic indicated today     RTC 4 weeks for repeat xrays.       Whitman Bloch, PA-C [facility]

## 2019-08-08 ENCOUNTER — APPOINTMENT (OUTPATIENT)
Dept: PHYSICAL THERAPY | Age: 74
End: 2019-08-08
Payer: MEDICARE

## 2019-08-09 ENCOUNTER — TELEPHONE (OUTPATIENT)
Dept: INTERNAL MEDICINE CLINIC | Age: 74
End: 2019-08-09

## 2019-08-13 ENCOUNTER — HOSPITAL ENCOUNTER (OUTPATIENT)
Dept: PHYSICAL THERAPY | Age: 74
Discharge: HOME | End: 2019-08-13
Payer: MEDICARE

## 2019-08-13 PROCEDURE — 97110 THERAPEUTIC EXERCISES: CPT

## 2019-08-13 PROCEDURE — 97140 MANUAL THERAPY 1/> REGIONS: CPT

## 2019-08-13 NOTE — PROGRESS NOTES
PT DAILY TREATMENT NOTE 10-18    Patient Name: Suzette Samples  Date:2019  : 1945  [x]  Patient  Verified  Payor: Ludivina Lopez / Plan: [address] [place] [address] HMO / Product Type: Managed Care Medicare /    In time:930  Out time:10:07  Total Treatment Time (min): 37  Visit #: 5 of 8    Medicare/BCBS Only   Total Timed Codes (min):  37 1:1 Treatment Time:  28       Treatment Area: Left shoulder pain [M25.512]  Displaced fracture of greater tuberosity of left humerus, initial encounter for closed fracture [S42.252A]    SUBJECTIVE  Pain Level (0-10 scale): 2  Any medication changes, allergies to medications, adverse drug reactions, diagnosis change, or new procedure performed?: [x] No    [] Yes (see summary sheet for update)  Subjective functional status/changes:   [] No changes reported  Pt reports she still has pain in the anterior shoulder that just won't seem to go away. OBJECTIVE      29 min Therapeutic Exercise:  [x] See flow sheet :   Rationale: increase ROM and increase strength to improve the patients ability to perform ADL's with ease. 8 min Manual Therapy:  Manual stretcing to the left shoulder (flex/abd/ER,IR)   Rationale: decrease pain, increase ROM and increase tissue extensibility to improve functional mobility. With   [] TE   [] TA   [] neuro   [] other: Patient Education: [x] Review HEP    [] Progressed/Changed HEP based on:   [] positioning   [] body mechanics   [] transfers   [] heat/ice application    [] other:      Other Objective/Functional Measures:      Pain Level (0-10 scale) post treatment: 2    ASSESSMENT/Changes in Function:   Pt remiains limited with abd and ER PROM that improves by the end of Manual. Pt continues to remain guarded during manual requiring cues to relax.       Patient will continue to benefit from skilled PT services to modify and progress therapeutic interventions, address functional mobility deficits, address ROM deficits, address strength deficits, analyze and address soft tissue restrictions, analyze and cue movement patterns, analyze and modify body mechanics/ergonomics and assess and modify postural abnormalities to attain remaining goals. [x]  See Plan of Care  []  See progress note/recertification  []  See Discharge Summary         Progress towards goals / Updated goals:  Updated Goals to be accomplished in 4 weeks:  1. Patient will be able to improve left shoulder flexion PROM to 140 degrees in order to improve ease of future ADLs. Progressing-Left shoulder flexion PROM - 132 degrees (8/6/19)   2. Patient will be able to begin AAROM exercises void of pain in order to improve ease of ADLs. Progressing - initiated pulleys, patient reports no pain just tightness (7/19/19).     Jax. Patient will be able to improve FOTO score to 64 in order to demonstrate improvements in functional independence. FOTO score - 54 (7/19/19).   4. Patient will be able to improve right shoulder flexion AAROM to 140 degrees in order to improve functional mobility.     Brit. Patient will be able to improve left shoulder flexion AROM to 130 degrees in order to improve ease of reaching a shelf Not begun AROM yet     PLAN  []  Upgrade activities as tolerated     []  Continue plan of care  []  Update interventions per flow sheet       []  Discharge due to:_  []  Other:_      Willy Rivas PTA 8/13/2019  9:32 AM    Future Appointments   Date Time Provider Mary Jane Cho   8/20/2019  9:30 AM Patrick Carreon PTA [facility]   9/4/2019  9:30 AM [facility]   9/12/2019 10:30 AM Hiral Leos MD [facility]

## 2019-08-15 ENCOUNTER — APPOINTMENT (OUTPATIENT)
Dept: PHYSICAL THERAPY | Age: 74
End: 2019-08-15
Payer: MEDICARE

## 2019-08-20 ENCOUNTER — HOSPITAL ENCOUNTER (OUTPATIENT)
Dept: PHYSICAL THERAPY | Age: 74
Discharge: HOME | End: 2019-08-20
Payer: MEDICARE

## 2019-08-20 PROCEDURE — 97140 MANUAL THERAPY 1/> REGIONS: CPT

## 2019-08-20 PROCEDURE — 97110 THERAPEUTIC EXERCISES: CPT

## 2019-08-20 NOTE — PROGRESS NOTES
PT DAILY TREATMENT NOTE 10-18    Patient Name: Jose German  Date:2019  : 1945  [x]  Patient  Verified  Payor: Patti Pretty / Plan: [address] [place] [address] HMO / Product Type: Managed Care Medicare /    In time:9:30  Out time:10:10  Total Treatment Time (min): 40  Visit #: 6 of 8    Medicare/BCBS Only   Total Timed Codes (min):  40 1:1 Treatment Time:  30       Treatment Area: Left shoulder pain [M25.512]  Displaced fracture of greater tuberosity of left humerus, initial encounter for closed fracture [S42.252A]    SUBJECTIVE  Pain Level (0-10 scale): 1/10  Any medication changes, allergies to medications, adverse drug reactions, diagnosis change, or new procedure performed?: [x] No    [] Yes (see summary sheet for update)  Subjective functional status/changes:   [] No changes reported  Pt reports significant change in pain. Pt reports continued compliance with HEP. OBJECTIVE    32 min Therapeutic Exercise:  [x] See flow sheet :   Rationale: increase ROM and increase strength to improve the patients ability to tolerate ADLs. 8 min Manual Therapy:  PROM to left shoulder   Rationale: decrease pain, increase ROM and increase tissue extensibility to improve functional mobility. With   [] TE   [] TA   [] neuro   [] other: Patient Education: [x] Review HEP    [] Progressed/Changed HEP based on:   [] positioning   [] body mechanics   [] transfers   [] heat/ice application    [] other:      Other Objective/Functional Measures:     AROM shoulder flexion 80 degrees    AAROM shoulder flexion 100 degrees     Pain Level (0-10 scale) post treatment: 1/10    ASSESSMENT/Changes in Function: Pt continues to demonstrate slow progress toward functional goals due to limited strength and pain with end range shoulder elevation.      Patient will continue to benefit from skilled PT services to modify and progress therapeutic interventions, address functional mobility deficits, address ROM deficits, address strength deficits, analyze and address soft tissue restrictions, analyze and cue movement patterns and analyze and modify body mechanics/ergonomics to attain remaining goals. []  See Plan of Care  []  See progress note/recertification  []  See Discharge Summary         Progress towards goals / Updated goals:  pdated Goals to be accomplished in 4 weeks:  1. Patient will be able to improve left shoulder flexion PROM to 140 degrees in order to improve ease of future ADLs.  Progressing-Left shoulder flexion PROM - 132 degrees (8/6/19)   2. Patient will be able to begin AAROM exercises void of pain in order to improve ease of ADLs. Progressing - No significant increase in pain with AAROM exercises. 8/20/19     3. Patient will be able to improve FOTO score to 64 in order to demonstrate improvements in functional independence. Met score 64. 8/20/19.   4. Patient will be able to improve right shoulder flexion AAROM to 140 degrees in order to improve functional mobility.  - Progressing AAROM shoulder flexion 100 degrees. 8/20/19    5. Patient will be able to improve left shoulder flexion AROM to 130 degrees in order to improve ease of reaching a shelf -Progressing 80 degrees.  8/20/19    PLAN  []  Upgrade activities as tolerated     [x]  Continue plan of care  []  Update interventions per flow sheet       []  Discharge due to:_  []  Other:_      Lupe Rosario PTA 8/20/2019  9:50 AM    Future Appointments   Date Time Provider Mary Jane Cho   9/4/2019  9:30 AM Leary Goodell, PA [address]   9/12/2019 10:30 AM Steven Calloway MD [facility]

## 2019-08-21 NOTE — PROGRESS NOTES
[facility] [address]  [place], [address]  [phone] fax    Continued Plan of Care/ Re-certification for Physical Therapy Services    Patient name: Britney Gonzalez Start of Care: 2019   Referral Ebony Alonzo, [address] : 1945               Medical Diagnosis: Left shoulder pain [M25.512]  Displaced fracture of greater tuberosity of left humerus, initial encounter for closed fracture [S42.252A]  Payor: Andrews Lisa / Plan: [address] [place] [address] HMO / Product Type: Managed Care Medicare /  [place] Date:19               Treatment Diagnosis: Left shoulder pain s/p left proximal humerus fracture   Prior Hospitalization: see medical history Provider#: [id number]   Medications: Verified on Patient summary List    Comorbidities: arthritis, COPD/emphysema   Prior Level of Function: (I) with ADLs.      Visits from Start of Care: 13    Missed Visits: 0    The Plan of Care and following information is based on the patient's current status:  Progress towards goals:  pdated Goals to be accomplished in 4 weeks:  1. Patient will be able to improve left shoulder flexion PROM to 140 degrees in order to improve ease of future ADLs.  Progressing-Left shoulder flexion PROM - 132 degrees (19)   2. Patient will be able to begin AAROM exercises void of pain in order to improve ease of ADLs. Progressing - No significant increase in pain with AAROM exercises. 19     3. Patient will be able to improve FOTO score to 64 in order to demonstrate improvements in functional independence. Met score 64. 19.   4. Patient will be able to improve right shoulder flexion AAROM to 140 degrees in order to improve functional mobility.  - Progressing AAROM shoulder flexion 100 degrees. 19    5. Patient will be able to improve left shoulder flexion AROM to 130 degrees in order to improve ease of reaching a shelf -Progressing 80 degrees. 8/20/19     Key functional changes: Pt continues to demonstrate slow progress toward functional goals due to limited strength and pain with end range shoulder elevation.          Problems/ barriers to goal attainment: none     Problem List: pain affecting function, decrease ROM, decrease strength, decrease ADL/ functional abilitiies, decrease activity tolerance and decrease flexibility/ joint mobility    Treatment Plan: Therapeutic exercise, Therapeutic activities, Neuromuscular re-education, Physical agent/modality, Manual therapy, Patient education and Self Care training     Patient Goal (s) has been updated and includes: \"to improve my skye     Goals for this certification period to be accomplished in 8 weeks:  1. Patient will be able to improve left shoulder flexion PROM to 140 degrees in order to improve ease of future ADLs.  Progressing-Left shoulder flexion PROM - 132 degrees (8/6/19)   2. Patient will be able to begin AAROM exercises void of pain in order to improve ease of ADLs. Progressing - No significant increase in pain with AAROM exercises. 8/20/19   3. Patient will be able to improve right shoulder flexion AAROM to 140 degrees in order to improve functional mobility.  - Progressing AAROM shoulder flexion 100 degrees. 8/20/19  4. Patient will be able to improve left shoulder flexion AROM to 130 degrees in order to improve ease of reaching a shelf -Progressing 80 degrees. 8/20/19    Frequency / Duration: Patient to be seen 1-2 times per week for 8 weeks:    Assessment / Recommendations: pt with slow progress with PT and will benefit from continuation to further progress. Certification Period: 8-21-19 to 10-18-19    Kwan Lara, PT 8/21/2019 8:18 AM    ________________________________________________________________________  I certify that the above Therapy Services are being furnished while the patient is under my care.  I agree with the treatment plan and certify that this therapy is necessary. [] I have read the above and request that my patient continue as recommended.   [] I have read the above report and request that my patient continue therapy with the following changes/special instructions: _____________________________________________  [] I have read the above report and request that my patient be discharged from therapy    Physician's Signature:____________Date:_________TIME:________    ** Signature, Date and Time must be completed for valid certification **    Please sign and return to [facility] [address] [place] [address] & [address]  [address] [place] [address] [address]  [phone] fax

## 2019-08-28 ENCOUNTER — HOSPITAL ENCOUNTER (OUTPATIENT)
Dept: PHYSICAL THERAPY | Age: 74
Discharge: HOME | End: 2019-08-28
Payer: MEDICARE

## 2019-08-28 PROCEDURE — 97110 THERAPEUTIC EXERCISES: CPT

## 2019-08-28 PROCEDURE — 97140 MANUAL THERAPY 1/> REGIONS: CPT

## 2019-08-28 NOTE — PROGRESS NOTES
PT DAILY TREATMENT NOTE 10-18    Patient Name: Tammy Ascencio  Date:2019  : 1945  [x]  Patient  Verified  Payor: Liz Garcia / Plan: [address] [place] [address] HMO / Product Type: Managed Care Medicare /    In time:9:30  Out time:10:14  Total Treatment Time (min): 44  Visit #: 1 of     Medicare/BCBS Only   Total Timed Codes (min):  44 1:1 Treatment Time:  40       Treatment Area: Left shoulder pain [M25.512]  Displaced fracture of greater tuberosity of left humerus, initial encounter for closed fracture [S42.252A]    SUBJECTIVE  Pain Level (0-10 scale): 1  Any medication changes, allergies to medications, adverse drug reactions, diagnosis change, or new procedure performed?: [x] No    [] Yes (see summary sheet for update)  Subjective functional status/changes:   [] No changes reported  Pt reports she see's her doctor on . Pt states she is feeling fine today. OBJECTIVE      36 min Therapeutic Exercise:  [x] See flow sheet :   Rationale: increase ROM and increase strength to improve the patients ability to perform functional task with ease. 8 min Manual Therapy:  Manual stretching left shoulder (flex/ext/ER)   Rationale: decrease pain, increase ROM and increase tissue extensibility to improve functional mobility. With   [] TE   [] TA   [] neuro   [] other: Patient Education: [x] Review HEP    [] Progressed/Changed HEP based on:   [] positioning   [] body mechanics   [] transfers   [] heat/ice application    [] other:      Other Objective/Functional Measures:      Pain Level (0-10 scale) post treatment: 1    ASSESSMENT/Changes in Function:   Pt displays some compensatory movements with the UT during elevation. limited mobility in the cervical spine with exercises today due to tightness and some pain, Left>Right. No change in pain following treatment.     Patient will continue to benefit from skilled PT services to modify and progress therapeutic interventions, address functional mobility deficits, address ROM deficits, address strength deficits, analyze and address soft tissue restrictions, analyze and cue movement patterns, analyze and modify body mechanics/ergonomics and assess and modify postural abnormalities to attain remaining goals. [x]  See Plan of Care  []  See progress note/recertification  []  See Discharge Summary         Progress towards goals / Updated goals:  1. Patient will be able to improve left shoulder flexion PROM to 140 degrees in order to improve ease of future ADLs.  Progressing-Left shoulder flexion PROM - 132 degrees (8/6/19)   2. Patient will be able to begin AAROM exercises void of pain in order to improve ease of ADLs. Progressing - No significant increase in pain with AAROM exercises. 8/20/19     3. Patient will be able to improve FOTO score to 64 in order to demonstrate improvements in functional independence. Met score 64. 8/20/19.   4. Patient will be able to improve right shoulder flexion AAROM to 140 degrees in order to improve functional mobility.  - Progressing AAROM shoulder flexion 100 degrees. 8/20/19    5. Patient will be able to improve left shoulder flexion AROM to 130 degrees in order to improve ease of reaching a shelf -Progressing 80 degrees.  8/20/19    PLAN  []  Upgrade activities as tolerated     [x]  Continue plan of care  []  Update interventions per flow sheet       []  Discharge due to:_  []  Other:_      Anderson Lezama PTA 8/28/2019  8:24 AM    Future Appointments   Date Time Provider Mary Jane Cho   8/28/2019  9:30 AM [facility]   9/4/2019  9:30 AM JOSE ARMANDO Moy [facility]   9/5/2019  9:30 AM Reece Yañez PTA [facility]   9/11/2019  9:30 AM [facility]   9/12/2019 10:30 AM Kristin Prescott MD [facility]   9/18/2019  9:30 AM Kylah Luke PTA [facility]

## 2019-09-03 ENCOUNTER — OFFICE VISIT (OUTPATIENT)
Dept: ORTHOPEDIC SURGERY | Age: 74
End: 2019-09-03

## 2019-09-03 VITALS
OXYGEN SATURATION: 98 % | WEIGHT: 242.6 LBS | SYSTOLIC BLOOD PRESSURE: 142 MMHG | TEMPERATURE: 95.2 F | HEART RATE: 77 BPM | DIASTOLIC BLOOD PRESSURE: 67 MMHG | BODY MASS INDEX: 38.99 KG/M2 | HEIGHT: 66 IN | RESPIRATION RATE: 17 BRPM

## 2019-09-03 DIAGNOSIS — S42.252D CLOSED DISPLACED FRACTURE OF GREATER TUBEROSITY OF LEFT HUMERUS WITH ROUTINE HEALING, SUBSEQUENT ENCOUNTER: Primary | ICD-10-CM

## 2019-09-03 NOTE — PROGRESS NOTES
1. Have you been to the ER, urgent care clinic since your last visit? Hospitalized since your last visit? No    2. Have you seen or consulted any other health care providers outside of the [address] [place] [address] since your last visit? Include any pap smears or colon screening.  No

## 2019-09-05 ENCOUNTER — HOSPITAL ENCOUNTER (OUTPATIENT)
Dept: PHYSICAL THERAPY | Age: 74
Discharge: HOME | End: 2019-09-05
Payer: MEDICARE

## 2019-09-05 PROCEDURE — 97110 THERAPEUTIC EXERCISES: CPT

## 2019-09-05 PROCEDURE — 97140 MANUAL THERAPY 1/> REGIONS: CPT

## 2019-09-05 NOTE — PROGRESS NOTES
PT DAILY TREATMENT NOTE 10-18    Patient Name: Oleg Mireles  Date:2019  : 1945  [x]  Patient  Verified  Payor: Kirill Omar / Plan: [address] [place] [address] HMO / Product Type: Managed Care Medicare /    In time:9:30  Out time:10:00  Total Treatment Time (min): 30  Visit #: 2 of     Medicare/BCBS Only   Total Timed Codes (min):  30 1:1 Treatment Time:  30       Treatment Area: Left shoulder pain [M25.512]  Displaced fracture of greater tuberosity of left humerus, initial encounter for closed fracture [S42.252A]    SUBJECTIVE  Pain Level (0-10 scale): 1/10  Any medication changes, allergies to medications, adverse drug reactions, diagnosis change, or new procedure performed?: [x] No    [] Yes (see summary sheet for update)  Subjective functional status/changes:   [] No changes reported  Pt reports no new complaints. Pt states she had a follow up with referring and her shoulder is healing well. OBJECTIVE    22 min Therapeutic Exercise:  [] See flow sheet :   Rationale: increase ROM and increase strength to improve the patients ability to tolerate ADLs. 8 min Manual Therapy:  PROM to left shoulder. Rationale: decrease pain, increase ROM and increase tissue extensibility to improve functional mobility. With   [] TE   [] TA   [] neuro   [] other: Patient Education: [x] Review HEP    [] Progressed/Changed HEP based on:   [] positioning   [] body mechanics   [] transfers   [] heat/ice application    [] other:      Other Objective/Functional Measures:   AAROM shoulder flexion 119 degrees  AROM shoulder flexion 118 degrees  PROM shoulder flexion 125 degrees     Pain Level (0-10 scale) post treatment: 0/10    ASSESSMENT/Changes in Function: Pt demonstrates improve AROM shoulder strength and mobility. Pt continues to have pain with shoulder abduction and ER.      Patient will continue to benefit from skilled PT services to modify and progress therapeutic interventions, address functional mobility deficits, address ROM deficits, address strength deficits, analyze and address soft tissue restrictions and analyze and cue movement patterns to attain remaining goals. []  See Plan of Care  []  See progress note/recertification  []  See Discharge Summary         Progress towards goals / Updated goals:  1. Patient will be able to improve left shoulder flexion PROM to 140 degrees in order to improve ease of future ADLs.  Progressing-Left shoulder flexion PROM - 125 degrees 9/5/19   2. Patient will be able to begin AAROM exercises void of pain in order to improve ease of ADLs. -Met. 9/5/19    3. Patient will be able to improve FOTO score to 64 in order to demonstrate improvements in functional independence.  Met score 64. 8/20/19.   4. Patient will be able to improve right shoulder flexion AAROM to 140 degrees in order to improve functional mobility.  - Progressing AAROM shoulder flexion 119 degrees.  9/5/19    5. Patient will be able to improve left shoulder flexion AROM to 130 degrees in order to improve ease of reaching a shelf -Progressing AROM shoulder flexion 118 degrees 9/5/19     PLAN  []  Upgrade activities as tolerated     [x]  Continue plan of care  []  Update interventions per flow sheet       []  Discharge due to:_  []  Other:_      Kristen Jefferson PTA 9/5/2019  9:41 AM    Future Appointments   Date Time Provider Mary Jane Cho   9/11/2019  9:30 AM Torie Meredith [facility]   9/12/2019 10:30 AM Felice Madrigal MD [facility]   9/18/2019  9:30 AM Torie Meredith PTA [facility]   10/15/2019  9:30 AM Buford Millet, [place], [place] [address]

## 2019-09-11 ENCOUNTER — HOSPITAL ENCOUNTER (OUTPATIENT)
Dept: PHYSICAL THERAPY | Age: 74
Discharge: HOME | End: 2019-09-11
Payer: MEDICARE

## 2019-09-11 PROCEDURE — 97110 THERAPEUTIC EXERCISES: CPT

## 2019-09-11 PROCEDURE — 97140 MANUAL THERAPY 1/> REGIONS: CPT

## 2019-09-11 NOTE — PROGRESS NOTES
PT DAILY TREATMENT NOTE 10-18    Patient Name: Hailee Angulo  Date:2019  : 1945  [x]  Patient  Verified  Payor: Ainsley Neves / Plan: [address] [place] [address] HMO / Product Type: Managed Care Medicare /    In time:9:28  Out time:10:11  Total Treatment Time (min): 43  Visit #: 3 of     Medicare/BCBS Only   Total Timed Codes (min):  43 1:1 Treatment Time:  35       Treatment Area: Left shoulder pain [M25.512]  Displaced fracture of greater tuberosity of left humerus, initial encounter for closed fracture [S42.252A]    SUBJECTIVE  Pain Level (0-10 scale): 1  Any medication changes, allergies to medications, adverse drug reactions, diagnosis change, or new procedure performed?: [x] No    [] Yes (see summary sheet for update)  Subjective functional status/changes:   [x] No changes reported  \"will the pain ever go away? \"    OBJECTIVE      34 min Therapeutic Exercise:  [x] See flow sheet :   Rationale: increase ROM and increase strength to improve the patients ability to perform ADL's with ease. 10 min Manual Therapy:  Manual stretching (flex/Ext/IR/ER), stm anterior delt   Rationale: decrease pain, increase ROM and increase tissue extensibility to improve functional mobility. With   [] TE   [] TA   [] neuro   [] other: Patient Education: [x] Review HEP    [] Progressed/Changed HEP based on:   [] positioning   [] body mechanics   [] transfers   [] heat/ice application    [] other:      Other Objective/Functional Measures:   T-band rows/ext 10xea peach  Full cans 10x each  Standing wand exercise 10x ea     Pain Level (0-10 scale) post treatment: 2    ASSESSMENT/Changes in Function:   Pt continues to display pain with movement during exercises but reports its not increasing pain. Pt demo's shoulder hiking compensatory movements with AROM exercises.      Patient will continue to benefit from skilled PT services to modify and progress therapeutic interventions, address functional mobility deficits, address ROM deficits, address strength deficits, analyze and address soft tissue restrictions, analyze and cue movement patterns, analyze and modify body mechanics/ergonomics and assess and modify postural abnormalities to attain remaining goals. [x]  See Plan of Care  []  See progress note/recertification  []  See Discharge Summary         Progress towards goals / Updated goals:  Goals for this certification period to be accomplished in 8 weeks:  1. Patient will be able to improve left shoulder flexion PROM to 140 degrees in order to improve ease of future ADLs.  Progressing-Left shoulder flexion PROM - 132 degrees (8/6/19)   2. Patient will be able to begin AAROM exercises void of pain in order to improve ease of ADLs. Progressing - No significant increase in pain with AAROM exercises. 8/20/19   3. Patient will be able to improve right shoulder flexion AAROM to 140 degrees in order to improve functional mobility.  - Progressing AAROM shoulder flexion 100 degrees. 8/20/19  4. Patient will be able to improve left shoulder flexion AROM to 130 degrees in order to improve ease of reaching a shelf -Progressing 80 degrees.  8/20/19    PLAN  []  Upgrade activities as tolerated     [x]  Continue plan of care  []  Update interventions per flow sheet       []  Discharge due to:_  []  Other:_      Saniya Hawkins PTA 9/11/2019  8:17 AM    Future Appointments   Date Time Provider Mary Jane Cho   9/11/2019  9:30 AM Mariana Soler PTA [facility]   9/12/2019 10:30 AM Gil Amaya MD [facility]   9/18/2019  9:30 AM Mariana Soler PTA [facility]   10/15/2019  9:30 AM [facility]

## 2019-09-12 ENCOUNTER — OFFICE VISIT (OUTPATIENT)
Dept: INTERNAL MEDICINE CLINIC | Age: 74
End: 2019-09-12

## 2019-09-12 VITALS
DIASTOLIC BLOOD PRESSURE: 58 MMHG | RESPIRATION RATE: 18 BRPM | HEART RATE: 79 BPM | HEIGHT: 66 IN | OXYGEN SATURATION: 96 % | WEIGHT: 238.6 LBS | TEMPERATURE: 98.3 F | BODY MASS INDEX: 38.35 KG/M2 | SYSTOLIC BLOOD PRESSURE: 130 MMHG

## 2019-09-12 DIAGNOSIS — E66.01 SEVERE OBESITY (HCC): ICD-10-CM

## 2019-09-12 DIAGNOSIS — E78.00 HIGH CHOLESTEROL: ICD-10-CM

## 2019-09-12 DIAGNOSIS — E03.9 HYPOTHYROIDISM, UNSPECIFIED TYPE: Primary | ICD-10-CM

## 2019-09-12 DIAGNOSIS — R21 RASH: ICD-10-CM

## 2019-09-12 DIAGNOSIS — Z71.89 ADVANCE CARE PLANNING: ICD-10-CM

## 2019-09-12 DIAGNOSIS — Z00.00 INITIAL MEDICARE ANNUAL WELLNESS VISIT: ICD-10-CM

## 2019-09-12 PROBLEM — M51.9 LUMBAR DISC DISEASE: Status: ACTIVE | Noted: 2019-09-12

## 2019-09-12 PROBLEM — E55.9 VITAMIN D DEFICIENCY: Status: ACTIVE | Noted: 2019-09-12

## 2019-09-12 RX ORDER — SIMVASTATIN 20 MG/1
20 TABLET, FILM COATED ORAL
Qty: 90 TAB | Refills: 3 | Status: SHIPPED | OUTPATIENT
Start: 2019-09-12 | End: 2020-11-02

## 2019-09-12 RX ORDER — CLOBETASOL PROPIONATE 0.05 MG/G
GEL TOPICAL AS NEEDED
Qty: 15 G | Refills: 3 | Status: SHIPPED | OUTPATIENT
Start: 2019-09-12 | End: 2022-06-09 | Stop reason: SDUPTHER

## 2019-09-12 RX ORDER — LEVOTHYROXINE SODIUM 112 UG/1
112 TABLET ORAL
Qty: 90 TAB | Refills: 3 | Status: SHIPPED | OUTPATIENT
Start: 2019-09-12 | End: 2020-11-02

## 2019-09-12 RX ORDER — CHOLECALCIFEROL (VITAMIN D3) 125 MCG
2000 CAPSULE ORAL DAILY
COMMUNITY

## 2019-09-12 NOTE — PATIENT INSTRUCTIONS
Health Maintenance Due   Topic Date Due    DTaP/Tdap/Td series (1 - Tdap) 11/14/1965    Shingrix Vaccine Age 50> (1 of 2) 11/14/1994    BREAST CANCER SCRN MAMMOGRAM  11/14/1994    FOBT Q 1 YEAR AGE 50-75  11/14/1994    GLAUCOMA SCREENING Q2Y  11/14/2009    Bone Densitometry (Dexa) Screening  11/14/2009    MEDICARE YEARLY EXAM  04/19/2019          Body Mass Index: Care Instructions  Your Care Instructions    Body mass index (BMI) can help you see if your weight is raising your risk for health problems. It uses a formula to compare how much you weigh with how tall you are. · A BMI lower than 18.5 is considered underweight. · A BMI between 18.5 and 24.9 is considered healthy. · A BMI between 25 and 29.9 is considered overweight. A BMI of 30 or higher is considered obese. If your BMI is in the normal range, it means that you have a lower risk for weight-related health problems. If your BMI is in the overweight or obese range, you may be at increased risk for weight-related health problems, such as high blood pressure, heart disease, stroke, arthritis or joint pain, and diabetes. If your BMI is in the underweight range, you may be at increased risk for health problems such as fatigue, lower protection (immunity) against illness, muscle loss, bone loss, hair loss, and hormone problems. BMI is just one measure of your risk for weight-related health problems. You may be at higher risk for health problems if you are not active, you eat an unhealthy diet, or you drink too much alcohol or use tobacco products. Follow-up care is a key part of your treatment and safety. Be sure to make and go to all appointments, and call your doctor if you are having problems. It's also a good idea to know your test results and keep a list of the medicines you take. How can you care for yourself at home? · Practice healthy eating habits.  This includes eating plenty of fruits, vegetables, whole grains, lean protein, and low-fat dairy.  · If your doctor recommends it, get more exercise. Walking is a good choice. Bit by bit, increase the amount you walk every day. Try for at least 30 minutes on most days of the week. · Do not smoke. Smoking can increase your risk for health problems. If you need help quitting, talk to your doctor about stop-smoking programs and medicines. These can increase your chances of quitting for good. · Limit alcohol to 2 drinks a day for men and 1 drink a day for women. Too much alcohol can cause health problems. If you have a BMI higher than 25  · Your doctor may do other tests to check your risk for weight-related health problems. This may include measuring the distance around your waist. A waist measurement of more than 40 inches in men or 35 inches in women can increase the risk of weight-related health problems. · Talk with your doctor about steps you can take to stay healthy or improve your health. You may need to make lifestyle changes to lose weight and stay healthy, such as changing your diet and getting regular exercise. If you have a BMI lower than 18.5  · Your doctor may do other tests to check your risk for health problems. · Talk with your doctor about steps you can take to stay healthy or improve your health. You may need to make lifestyle changes to gain or maintain weight and stay healthy, such as getting more healthy foods in your diet and doing exercises to build muscle. Where can you learn more? Go to http://radha-lanette.info/. Enter S176 in the search box to learn more about \"Body Mass Index: Care Instructions. \"  Current as of: March 28, 2019  Content Version: 12.1  © 5160-1420 Healthwise, Incorporated. Care instructions adapted under license by [employer] (which disclaims liability or warranty for this information).  If you have questions about a medical condition or this instruction, always ask your healthcare professional. [employer] disclaims any warranty or liability for your use of this information. Medicare Part B Preventive Services Limitations Recommendation Scheduled   Bone Mass Measurement  (age 72 & older, biennial) Requires diagnosis related to osteoporosis or estrogen deficiency. Biennial benefit unless patient has history of long-term glucocorticoid tx or baseline is needed because initial test was by other method This should be done at age 72 and again if on osteoporosis medication at 2-3 year intervals. Up to date   Cardiovascular Screening Blood Tests (every 5 years)  Total cholesterol, HDL, Triglycerides Order as a panel if possible We should check your cholesterol panel at least once every 5 years. Up to date   Colorectal Cancer Screening  -Fecal occult blood test (annual)  -Flexible sigmoidoscopy (5y)  -Screening colonoscopy (10y)  -Barium Enema  Due per your Gastroenterologist's recommendations. Up to date   Counseling to Prevent Tobacco Use (up to 8 sessions per year)  - Counseling greater than 3 and up to 10 minutes  - Counseling greater than 10 minutes Patients must be asymptomatic of tobacco-related conditions to receive as preventive service Continue with smoking cessation    Diabetes Screening Tests (at least every 3 years, Medicare covers annually or at 6-month intervals for prediabetic patients)    Fasting blood sugar (FBS) or glucose tolerance test (GTT) Patient must be diagnosed with one of the following:  -Hypertension, Dyslipidemia, obesity, previous impaired FBS or GTT  Or any two of the following: overweight, FH of diabetes, age ? 72, history of gestational diabetes, birth of baby weighing more than 9 pounds Should be done yearly Up to date   Diabetes Self-Management Training (DSMT) (no USPSTF recommendation) Requires referral by treating physician for patient with diabetes or renal disease. 10 hours of initial DSMT session of no less than 30 minutes each in a continuous 12-month period.   2 hours of follow-up DSMT in subsequent years. Not applicable Not applicable   Glaucoma Screening (no USPSTF recommendation) Diabetes mellitus, family history, , age 48 or over,  American, age 72 or over Continue with annual eye exams. Up to date   Human Immunodeficiency Virus (HIV) Screening (annually for increased risk patients)  HIV-1 and HIV-2 by EIA, RENETTA, rapid antibody test, or oral mucosa transudate Patient must be at increased risk for HIV infection per USPSTF guidelines or pregnant. Tests covered annually for patients at increased risk. Pregnant patients may receive up to 3 test during pregnancy. Not applicable Not applicable   Medical Nutrition Therapy (MNT) (for diabetes or renal disease not recommended schedule) Requires referral by treating physician for patient with diabetes or renal disease. Can be provided in same year as diabetes self-management training (DSMT), and CMS recommends medical nutrition therapy take place after DSMT. Up to 3 hours for initial year and 2 hours in subsequent years. Not applicable Not applicable   Shingles Vaccination A shingles vaccine is also recommended once in a lifetime after age 61 Vaccination recommended for shingles vaccination. Discussed today   Seasonal Influenza Vaccination (annually)  Continue with yearly \"flu\" shot annually Overdue   Pneumococcal Vaccination (once after 65)  Please receive this vaccination at age 72. Up to date   Hepatitis B Vaccinations (if medium/high risk) Medium/high risk factors:  End-stage renal disease,  Hemophiliacs who received Factor VIII or IX concentrates, Clients of institutions for the mentally retarded, Persons who live in the same house as a HepB virus carrier, Homosexual men, Illicit injectable drug abusers. Not applicable Not applicable   Screening Mammography (biennial age 54-69) Annually (age 36 or over) You need a mammogram yearly to screen for breast cancer.  Up to date   Screening Pap Tests and Pelvic Examination (up to age 79 and after 79 if unknown history or abnormal study last 10 years) Every 24 months except high risk You need no Pap smear at this time. Not warranted   Ultrasound Screening for Abdominal Aortic Aneurysm (AAA) (once) Patient must be referred through IPPE and not have had a screening for abdominal aortic aneurysm before under Medicare. Limited to patients who meet one of the following criteria:  - Men who are 73-68 years old and have smoked more than 100 cigarettes in their lifetime.  -Anyone with a FH of AAA  -Anyone recommended for screening by USPSTF Not applicable Not applicable       Medicare Wellness Visit, Female     The best way to live healthy is to have a lifestyle where you eat a well-balanced diet, exercise regularly, limit alcohol use, and quit all forms of tobacco/nicotine, if applicable. Regular preventive services are another way to keep healthy. Preventive services (vaccines, screening tests, monitoring & exams) can help personalize your care plan, which helps you manage your own care. Screening tests can find health problems at the earliest stages, when they are easiest to treat. [facility] follows the current, evidence-based guidelines published by the [place] (USPSTF) when recommending preventive services for our patients. Because we follow these guidelines, sometimes recommendations change over time as research supports it. (For example, mammograms used to be recommended annually. Even though Medicare will still pay for an annual mammogram, the newer guidelines recommend a mammogram every two years for women of average risk.)  Of course, you and your doctor may decide to screen more often for some diseases, based on your risk and your health status.    Preventive services for you include:  - Medicare offers their members a free annual wellness visit, which is time for you and your primary care provider to discuss and plan for your preventive service needs. Take advantage of this benefit every year!  -All adults over the age of 72 should receive the recommended pneumonia vaccines. Current USPSTF guidelines recommend a series of two vaccines for the best pneumonia protection.   -All adults should have a flu vaccine yearly and a tetanus vaccine every 10 years. All adults age 61 and older should receive a shingles vaccine once in their lifetime.    -A bone mass density test is recommended when a woman turns 65 to screen for osteoporosis. This test is only recommended one time, as a screening. Some providers will use this same test as a disease monitoring tool if you already have osteoporosis. -All adults age 38-68 who are overweight should have a diabetes screening test once every three years.   -Other screening tests and preventive services for persons with diabetes include: an eye exam to screen for diabetic retinopathy, a kidney function test, a foot exam, and stricter control over your cholesterol.   -Cardiovascular screening for adults with routine risk involves an electrocardiogram (ECG) at intervals determined by your doctor.   -Colorectal cancer screenings should be done for adults age 54-65 with no increased risk factors for colorectal cancer. There are a number of acceptable methods of screening for this type of cancer. Each test has its own benefits and drawbacks. Discuss with your doctor what is most appropriate for you during your annual wellness visit. The different tests include: colonoscopy (considered the best screening method), a fecal occult blood test, a fecal DNA test, and sigmoidoscopy. -Breast cancer screenings are recommended every other year for women of normal risk, age 54-69.  -Cervical cancer screenings for women over age 72 are only recommended with certain risk factors.   -All adults born between [facility] should be screened once for Hepatitis C.      Here is a list of your current Health Maintenance items (your personalized list of preventive services) with a due date:  Health Maintenance Due   Topic Date Due    DTaP/Tdap/Td  (1 - Tdap) 11/14/1965    Shingles Vaccine (1 of 2) 11/14/1994    Mammogram  11/14/1994    Stool testing for trace blood  11/14/1994    Glaucoma Screening   11/14/2009    Bone Mineral Density   11/14/2009    Annual Well Visit  04/19/2019

## 2019-09-12 NOTE — PROGRESS NOTES
INTERNISTS OF [facility]:  9/15/2019, MRN: [id number]      Lluvia Schuler is a 75 y.o. female and presents to clinic for [address] ([address]) and Cholesterol Problem (follow up)    Subjective: The pt is a 67yo female with h/o obesity, hypothyroidism, HLD, COPD?, GERD, lumbar disc disease, vitamin D deficiency, and lichen planus. 1. HLD: Taking simvastatin. No adverse effects of taking his medication. Weight is 238lbs. Not dieting. Wt Readings from Last 3 Encounters:   09/12/19 238 lb 9.6 oz (108.2 kg)   09/03/19 242 lb 9.6 oz (110 kg)   08/06/19 240 lb (108.9 kg)     2. Hypothyroidism: Present for >6 months. Taking Synthroid 112mcg daily. She takes his medication first thing the morning on empty stomach. 3. Health Maintenance:  - Overdue for a mammogram per our records. Last November was her last one and it was \"normal\" per her hx.  - Overdue for a formal eye exam; she is planning to schedule a formal eye exam. She wears glasses. No blurry vision.   - Overdue for a bone density per our records. It was done last November.   - Tobacco use: None  - Energy drink consumption:None  - ETOH use: None  - Drug use: None        Patient Active Problem List    Diagnosis Date Noted    Lumbar disc disease 09/12/2019    Vitamin D deficiency 09/12/2019    Severe obesity ([address] 04/21/2019    Hypothyroidism     High cholesterol     COPD, mild (HCC)     Acid reflux     Lichen planus        Current Outpatient Medications   Medication Sig Dispense Refill    cholecalciferol, vitamin D3, (VITAMIN D3) 2,000 unit tab Take 2,000 Units by mouth daily.  levothyroxine (SYNTHROID) 112 mcg tablet Take 1 Tab by mouth Daily (before breakfast). 90 Tab 3    simvastatin (ZOCOR) 20 mg tablet Take 1 Tab by mouth nightly. 90 Tab 3    clobetasol (TEMOVATE) 0.05 % topical gel Apply  to affected area as needed (mouth sores). 15 g 3    aspirin delayed-release 81 mg tablet Take 81 mg by mouth daily.       multivitamin (ONE A DAY) tablet Take 1 Tab by mouth daily.  umeclidinium (INCRUSE ELLIPTA) 62.5 mcg/actuation inhaler Take 1 Puff by inhalation daily. 1 Inhaler 3       Allergies   Allergen Reactions    Penicillins Rash       Past Medical History:   Diagnosis Date    Acid reflux     COPD, mild (HCC)     High cholesterol     Hypothyroidism     Joint pain     Joint swelling     Lichen planus     Sinus problem     Urinary frequency        Past Surgical History:   Procedure Laterality Date    HX KNEE REPLACEMENT Bilateral     left 2013, right 2016    HX PARTIAL HYSTERECTOMY  1967       Family History   Problem Relation Age of Onset    Heart Disease Mother     Hypertension Mother     No Known Problems Father     Breast Cancer Paternal Aunt     Diabetes Maternal Grandmother     Cancer Brother         pancreatic cancer       Social History     Tobacco Use    Smoking status: Former Smoker     Packs/day: 0.50     Years: 40.00     Pack years: 20.00     Types: Cigarettes     Last attempt to quit: 4/19/2009     Years since quitting: 10.4    Smokeless tobacco: Never Used   Substance Use Topics    Alcohol use: Not Currently       ROS   Review of Systems   Constitutional: Negative for chills and fever. HENT: Negative for ear pain and sore throat. Eyes: Negative for blurred vision and pain. Respiratory: Negative for cough and shortness of breath. Cardiovascular: Negative for chest pain. Gastrointestinal: Negative for abdominal pain, blood in stool and melena. Genitourinary: Negative for dysuria and hematuria. Musculoskeletal: Positive for joint pain (left shoulder pain, improving, participating in PT). Negative for myalgias. Skin: Negative for rash. Neurological: Negative for tingling, focal weakness and headaches. Endo/Heme/Allergies: Does not bruise/bleed easily. Psychiatric/Behavioral: Negative for substance abuse.        Objective     Vitals:    09/12/19 1031   BP: 130/58   Pulse: 79   Resp: 18 Temp: 98.3 °F (36.8 °C)   TempSrc: Oral   SpO2: 96%   Weight: 238 lb 9.6 oz (108.2 kg)   Height: 5' 6.25\" (1.683 m)   PainSc:   1   PainLoc: Shoulder       Physical Exam   Constitutional: She is oriented to person, place, and time and well-developed, well-nourished, and in no distress. HENT:   Head: Normocephalic and atraumatic. Right Ear: External ear normal.   Left Ear: External ear normal.   Nose: Nose normal.   Mouth/Throat: Oropharynx is clear and moist. No oropharyngeal exudate. Eyes: Pupils are equal, round, and reactive to light. Conjunctivae and EOM are normal. Right eye exhibits no discharge. Left eye exhibits no discharge. No scleral icterus. Neck: Neck supple. Cardiovascular: Normal rate, regular rhythm, normal heart sounds and intact distal pulses. Exam reveals no gallop and no friction rub. No murmur heard. Pulmonary/Chest: Effort normal and breath sounds normal. No respiratory distress. She has no wheezes. She has no rales. Abdominal: Soft. Bowel sounds are normal. She exhibits no distension. There is no tenderness. There is no rebound and no guarding. Musculoskeletal: She exhibits no edema or tenderness (Bue). Lymphadenopathy:     She has no cervical adenopathy. Neurological: She is alert and oriented to person, place, and time. She exhibits normal muscle tone. Gait normal.   Skin: Skin is warm and dry. No erythema. Psychiatric: Affect normal.   Nursing note and vitals reviewed.       LABS   Data Review:   Lab Results   Component Value Date/Time    WBC 5.3 04/23/2019 10:30 AM    HGB 13.6 04/23/2019 10:30 AM    HCT 40.5 04/23/2019 10:30 AM    PLATELET 693 09/85/6708 10:30 AM    MCV 95 04/23/2019 10:30 AM       Lab Results   Component Value Date/Time    Sodium 142 04/23/2019 10:30 AM    Potassium 4.4 04/23/2019 10:30 AM    Chloride 102 04/23/2019 10:30 AM    CO2 25 04/23/2019 10:30 AM    Glucose 84 04/23/2019 10:30 AM    BUN 15 04/23/2019 10:30 AM    Creatinine 0.66 04/23/2019 10:30 AM    BUN/Creatinine ratio 23 04/23/2019 10:30 AM    GFR est  04/23/2019 10:30 AM    GFR est non-AA 87 04/23/2019 10:30 AM    Calcium 9.3 04/23/2019 10:30 AM       Lab Results   Component Value Date/Time    Cholesterol, total 167 04/23/2019 10:30 AM    HDL Cholesterol 63 04/23/2019 10:30 AM    LDL, calculated 91 04/23/2019 10:30 AM    VLDL, calculated 13 04/23/2019 10:30 AM    Triglyceride 63 04/23/2019 10:30 AM       Lab Results   Component Value Date/Time    Hemoglobin A1c 5.3 04/23/2019 10:30 AM       Assessment/Plan:   1. General:   Refilling her clobetasol cream which she uses as needed. She is asymptomatic today. ORDERS:  - clobetasol (TEMOVATE) 0.05 % topical gel; Apply  to affected area as needed (mouth sores). Dispense: 15 g; Refill: 3    2. Hypothyroidism: Stable. Refilling her Synthroid. We will check a CBC, BMP, and TFTs just before her follow-up appointment. ORDERS:  - levothyroxine (SYNTHROID) 112 mcg tablet; Take 1 Tab by mouth Daily (before breakfast). Dispense: 90 Tab; Refill: 3    3. High cholesterol: Stable. Checking a BMP and a lipid panel just before her follow-up appointment. I encouraged her to reduce her processed food intake. She is to maintain a heart healthy diet. I will recheck her weight at her follow-up appoint. Refilling her simvastatin. ORDERS:  - simvastatin (ZOCOR) 20 mg tablet; Take 1 Tab by mouth nightly. Dispense: 90 Tab; Refill: 3    4. Health Maintenance:  Requesting records. We will need to perform a clock drawing exercise at her follow-up appointment. She had difficulty completing this exercise today.       Health Maintenance Due   Topic Date Due    DTaP/Tdap/Td series (1 - Tdap) 11/14/1965    Shingrix Vaccine Age 50> (1 of 2) 11/14/1994    BREAST CANCER SCRN MAMMOGRAM  11/14/1994    FOBT Q 1 YEAR AGE 50-75  11/14/1994    GLAUCOMA SCREENING Q2Y  11/14/2009    Bone Densitometry (Dexa) Screening  11/14/2009    MEDICARE YEARLY EXAM  04/19/2019     Lab review: labs are reviewed in the EHR and ordered as mentioned above    I have discussed the diagnosis with the patient and the intended plan as seen in the above orders. The patient has received an after-visit summary and questions were answered concerning future plans. I have discussed medication side effects and warnings with the patient as well. I have reviewed the plan of care with the patient, accepted their input and they are in agreement with the treatment goals. All questions were answered. The patient understands the plan of care. Handouts provided today with above information. Pt instructed if symptoms worsen to call the office or report to the ED for continued care. Greater than 50% of the visit time was spent in counseling and/or coordination of care. Voice recognition was used to generate this report, which may have resulted in some phonetic based errors in grammar and contents. Even though attempts were made to correct all the mistakes, some may have been missed, and remained in the body of the document. Follow-up and Dispositions    · Return in about 6 months (around 3/12/2020).          Joel Curry MD

## 2019-09-12 NOTE — PROGRESS NOTES
Chief Complaint   Patient presents with   [address] [facility] [place] [address]    Cholesterol Problem     follow up       1. Have you been to the ER, urgent care clinic since your last visit? Hospitalized since your last visit? Yes When: 6-2-19 [facility], Reason: Fall at home on the Left Shoulder with Fracture. The patient is presently in Physical Therapy once a week. 2. Have you seen or consulted any other health care providers outside of the [address] [place] [address] since your last visit? Include any pap smears or colon screening. No    Patient was given a copy of the Advanced Directive and understands to bring it in once completed.   Health Maintenance Due   Topic Date Due    DTaP/Tdap/Td series (1 - Tdap) 11/14/1965    Shingrix Vaccine Age 50> (1 of 2) 11/14/1994    BREAST CANCER SCRN MAMMOGRAM  11/14/1994    FOBT Q 1 YEAR AGE 50-75  11/14/1994    GLAUCOMA SCREENING Q2Y  11/14/2009    Bone Densitometry (Dexa) Screening  11/14/2009    MEDICARE YEARLY EXAM  04/19/2019

## 2019-09-15 NOTE — PROGRESS NOTES
INTERNISTS OF [facility]:  09/15/19, [id number]      The Initial Medicare Annual Wellness Exam PROGRESS NOTE    This is an Initial Medicare Annual Wellness Exam (AWV). I have reviewed the patient's medical history in detail and updated the computerized patient record. Paolo Rendon is a 75 y.o.  female and presents for an annual wellness exam.    SUBJECTIVE  Past Medical History:   Diagnosis Date    Acid reflux     COPD, mild (HCC)     High cholesterol     Hypothyroidism     Joint pain     Joint swelling     Lichen planus     Sinus problem     Urinary frequency       Past Surgical History:   Procedure Laterality Date    HX KNEE REPLACEMENT Bilateral     left 2013, right 2016    HX PARTIAL HYSTERECTOMY  1967     Current Outpatient Medications   Medication Sig Dispense Refill    cholecalciferol, vitamin D3, (VITAMIN D3) 2,000 unit tab Take 2,000 Units by mouth daily.  levothyroxine (SYNTHROID) 112 mcg tablet Take 1 Tab by mouth Daily (before breakfast). 90 Tab 3    simvastatin (ZOCOR) 20 mg tablet Take 1 Tab by mouth nightly. 90 Tab 3    clobetasol (TEMOVATE) 0.05 % topical gel Apply  to affected area as needed (mouth sores). 15 g 3    aspirin delayed-release 81 mg tablet Take 81 mg by mouth daily.  multivitamin (ONE A DAY) tablet Take 1 Tab by mouth daily.  umeclidinium (INCRUSE ELLIPTA) 62.5 mcg/actuation inhaler Take 1 Puff by inhalation daily.  1 Inhaler 3     Allergies   Allergen Reactions    Penicillins Rash     Family History   Problem Relation Age of Onset    Heart Disease Mother     Hypertension Mother     No Known Problems Father     Breast Cancer Paternal Aunt     Diabetes Maternal Grandmother     Cancer Brother         pancreatic cancer     Social History     Tobacco Use    Smoking status: Former Smoker     Packs/day: 0.50     Years: 40.00     Pack years: 20.00     Types: Cigarettes     Last attempt to quit: 4/19/2009     Years since quitting: 10.4    Smokeless tobacco: Never Used   Substance Use Topics    Alcohol use: Not Currently     Patient Active Problem List   Diagnosis Code    Hypothyroidism E03.9    High cholesterol E78.00    COPD, mild (HCC) J44.9    Acid reflux P11.0    Lichen planus N91.0    Severe obesity ([facility] [address] E66.01    Lumbar disc disease M51.9    Vitamin D deficiency E55.9     The pt is a 65yo female with h/o obesity, hypothyroidism, HLD, COPD?, GERD, lumbar disc disease, vitamin D deficiency, and lichen planus. Health Maintenance History  Immunizations reviewed: Tdap over-due   Pneumovax: over-due   Flu: over-due  Zoster: over-due      There is no immunization history on file for this patient. Colonoscopy: Up to date. She had a Cologuard with the past year but we do not have records. Eye exam: Up-to-date. We do not have records    Mammo: Up-to-date. We do not have records. No breast pain or masses. Dexascan: Up-to-date. We do not have records. Pelvic/Pap: No vaginal bleeding. History of a hysterectomy      Review of Systems   Constitutional: Negative for chills and fever. HENT: Negative for ear pain and sore throat. Eyes: Negative for blurred vision and pain. Respiratory: Negative for shortness of breath. Cardiovascular: Negative for chest pain. Gastrointestinal: Negative for abdominal pain, blood in stool and melena. Genitourinary: Negative for dysuria and hematuria. Musculoskeletal: Positive for joint pain (left shoulder pain). Negative for myalgias. Skin: Negative for rash. Neurological: Negative for tingling, focal weakness and headaches. Endo/Heme/Allergies: Does not bruise/bleed easily. Psychiatric/Behavioral: Negative for substance abuse. Depression Risk Factor Screening:      Patient Health Questionnaire (PHQ-2)   Over the last 2 weeks, how often have you been bothered by any of the following problems? · Little interest or pleasure in doing things? · Not at all. [0]  · Feeling down, depressed, or hopeless? · Not at all. [0]    Total Score: 0/6  PHQ-2 Assessment Scoring:   A score of 2 or more requires further screening with the PHQ-9    Alcohol Risk Factor Screening:   Women: On any occasion during the past 3 months, have you had more than 3 drinks containing alcohol? no    Do you average more than 7 drinks per week? no    Tobacco Use Screening:     Social History     Tobacco Use   Smoking Status Former Smoker    Packs/day: 0.50    Years: 40.00    Pack years: 20.00    Types: Cigarettes    Last attempt to quit: 4/19/2009    Years since quitting: 10.4   Smokeless Tobacco Never Used       Hearing Loss    Hearing is good. Activities of Daily Living   Self-care. Requires assistance with: no ADLs  Despite her shoulder pain, she does not need assistance with ADLs/IADLs at this time. Fall Risk   She had one fall that caused her shoulder injury earlier this year. Abuse Screen   Patient is not abused  None    Additional Examination Findings:  Vitals:    09/12/19 1031   BP: 130/58   Pulse: 79   Resp: 18   Temp: 98.3 °F (36.8 °C)   TempSrc: Oral   SpO2: 96%   Weight: 238 lb 9.6 oz (108.2 kg)   Height: 5' 6.25\" (1.683 m)   PainSc:   1   PainLoc: Shoulder      Body mass index is 38.22 kg/m². Evaluation of Cognitive Function:  Mood/affect: Euthymic  Appearance: Well-groomed  Family member/caregiver input: She is not accompanied by a family member. General:   Well-nourished, well-groomed, pleasant, alert, in no acute distress. Head:  Normocephalic, atraumatic  Ears:  External ears WNL  Eyes:  Clear sclera  Neuro:   Alert, conversant, appropriate, following commands, no sensory deficit   Skin:    No rashes noted  Psych:  Affect, mood and judgment appropriate      Dementia Screen (Mini-Cog): Three Item Recall: 3/3  Clock Drawing (ten past eleven) Exercise: Unable to complete this exercise correctly.       LABS   Data Review:   Lab Results   Component Value Date/Time    Sodium 142 04/23/2019 10:30 AM    Potassium 4.4 04/23/2019 10:30 AM    Chloride 102 04/23/2019 10:30 AM    CO2 25 04/23/2019 10:30 AM    Glucose 84 04/23/2019 10:30 AM    BUN 15 04/23/2019 10:30 AM    Creatinine 0.66 04/23/2019 10:30 AM    BUN/Creatinine ratio 23 04/23/2019 10:30 AM    GFR est  04/23/2019 10:30 AM    GFR est non-AA 87 04/23/2019 10:30 AM    Calcium 9.3 04/23/2019 10:30 AM       Lab Results   Component Value Date/Time    WBC 5.3 04/23/2019 10:30 AM    HGB 13.6 04/23/2019 10:30 AM    HCT 40.5 04/23/2019 10:30 AM    PLATELET 612 87/58/7519 10:30 AM    MCV 95 04/23/2019 10:30 AM       Lab Results   Component Value Date/Time    Hemoglobin A1c 5.3 04/23/2019 10:30 AM       Lab Results   Component Value Date/Time    Cholesterol, total 167 04/23/2019 10:30 AM    HDL Cholesterol 63 04/23/2019 10:30 AM    LDL, calculated 91 04/23/2019 10:30 AM    VLDL, calculated 13 04/23/2019 10:30 AM    Triglyceride 63 04/23/2019 10:30 AM       Patient Care Team:  Jeremiah Cruz NP as PCP - General (Internal Medicine)    End-of-life planning  Advanced Directive in the case than an injury or illness causes the patient to be unable to make health care decisions was discussed with the patient. Advice/Referrals/Counselling/Plan:   Education and counseling provided:  Are appropriate based on today's review and evaluation  End-of-Life planning (with patient's consent)  Pneumococcal Vaccine  Influenza Vaccine  Screening Mammography  Colorectal cancer screening tests  Cardiovascular screening blood test  Bone mass measurement (DEXA)  Screening for glaucoma  Diabetes screening test  Include in education list (weight loss, physical activity, smoking cessation, fall prevention, and nutrition)    ICD-10-CM ICD-9-CM    1. Hypothyroidism, unspecified type E03.9 244.9 levothyroxine (SYNTHROID) 112 mcg tablet      LIPID PANEL      METABOLIC PANEL, BASIC      TSH AND FREE T4      CBC WITH AUTOMATED DIFF   2. Rash R21 782.1 clobetasol (TEMOVATE) 0.05 % topical gel   3. High cholesterol E78.00 272.0 simvastatin (ZOCOR) 20 mg tablet      LIPID PANEL      METABOLIC PANEL, BASIC   4. Severe obesity (HCC) E66.01 278.01 LIPID PANEL      METABOLIC PANEL, BASIC      TSH AND FREE T4     reviewed diet, exercise and weight control. Brief written plan, checklist    Assessment/Plan:    Health Maintenance:  I encouraged her to get all recommend vaccinations and screenings. I am requesting her last Cologuard, mammogram, eye exam, and bone density study. I am also requesting her vaccination records. Lab review: labs are reviewed in the [address] [place] [address] (ACP) Provider Conversation     Date of ACP Conversation: 09/15/19  Persons included in Conversation:  patient    Authorized Decision Maker (if patient is incapable of making informed decisions): This person is:   Healthcare Agent/Medical Power of  under Advance Directive - see below. For Patients with Decision Making Capacity:   Values/Goals: Exploration of values, goals, and preferences if recovery is not expected, even with continued medical treatment in the event of:  Imminent death  Severe, permanent brain injury    Conversation Outcomes / Follow-Up Plan:   Recommended completion of Advance Directive form after review of ACP materials and conversation with prospective healthcare agent . The patient was given an advance directive to complete. She wants her  to be her POA. She is unsure as to who she wants to be the successor POA. I have discussed the diagnosis with the patient and the intended plan as seen in the above orders. The patient has received an after-visit summary and questions were answered concerning future plans. I have discussed medication side effects and warnings with the patient as well. I have reviewed the plan of care with the patient, accepted their input and they are in agreement with the treatment goals. Follow-up and Dispositions    · Return in about 6 months (around 3/12/2020).

## 2019-09-15 NOTE — ACP (ADVANCE CARE PLANNING)
Advance Care Planning  Advance Care Planning (ACP) Provider Conversation     Date of ACP Conversation: 09/15/19  Persons included in Conversation:  patient    Authorized Decision Maker (if patient is incapable of making informed decisions): This person is:   Healthcare Agent/Medical Power of  under Advance Directive - see below. For Patients with Decision Making Capacity:   Values/Goals: Exploration of values, goals, and preferences if recovery is not expected, even with continued medical treatment in the event of:  Imminent death  Severe, permanent brain injury    Conversation Outcomes / Follow-Up Plan:   Recommended completion of Advance Directive form after review of ACP materials and conversation with prospective healthcare agent . The patient was given an advance directive to complete. She wants her  to be her POA. She is unsure as to who she wants to be the successor POA.     Dr. Toya Duffy  Internists of [address].  Phone: [phone]  Fax: [phone]

## 2019-09-18 ENCOUNTER — HOSPITAL ENCOUNTER (OUTPATIENT)
Dept: PHYSICAL THERAPY | Age: 74
Discharge: HOME | End: 2019-09-18
Payer: MEDICARE

## 2019-09-18 PROCEDURE — 97140 MANUAL THERAPY 1/> REGIONS: CPT

## 2019-09-18 PROCEDURE — 97110 THERAPEUTIC EXERCISES: CPT

## 2019-09-18 NOTE — PROGRESS NOTES
PT DAILY TREATMENT NOTE 10-18    Patient Name: Julianne Villegas  Date:2019  : 1945  [x]  Patient  Verified  Payor: Daysi Campbell / Plan: [address] [place] [address] HMO / Product Type: Managed Care Medicare /    In time:9:30  Out time:10:11  Total Treatment Time (min): 41  Visit #: 4 of     Medicare/BCBS Only   Total Timed Codes (min):  41 1:1 Treatment Time:  32       Treatment Area: Left shoulder pain [M25.512]  Displaced fracture of greater tuberosity of left humerus, initial encounter for closed fracture [S42.252A]    SUBJECTIVE  Pain Level (0-10 scale): 1  Any medication changes, allergies to medications, adverse drug reactions, diagnosis change, or new procedure performed?: [x] No    [] Yes (see summary sheet for update)  Subjective functional status/changes:   [] No changes reported  Pt reports her anterior shoulder pain just won't go away. OBJECTIVE    33 min Therapeutic Exercise:  [x] See flow sheet :   Rationale: increase ROM and increase strength to improve the patients ability to perform functional task with ease. 8 min Manual Therapy:  Manual stretching (flex/Ext/IR/ER), stm anterior delt   Rationale: decrease pain, increase ROM and increase tissue extensibility to improve functional mobility. With   [] TE   [] TA   [] neuro   [] other: Patient Education: [x] Review HEP    [] Progressed/Changed HEP based on:   [] positioning   [] body mechanics   [] transfers   [] heat/ice application    [] other:      Other Objective/Functional Measures:   T-band IR/ER w/ Peach band 10xea     Pain Level (0-10 scale) post treatment: 1    ASSESSMENT/Changes in Function:   IR/ER with t band added to continue to improve left shoulder mobility. Cueing required for form with good carry over.  No change in pain post treatment    Patient will continue to benefit from skilled PT services to modify and progress therapeutic interventions, address functional mobility deficits, address ROM deficits, address strength deficits, analyze and address soft tissue restrictions, analyze and cue movement patterns, analyze and modify body mechanics/ergonomics and assess and modify postural abnormalities to attain remaining goals. [x]  See Plan of Care  []  See progress note/recertification  []  See Discharge Summary         Progress towards goals / Updated goals:  Goals for this certification period to be accomplished in 8 weeks:  1. Patient will be able to improve left shoulder flexion PROM to 140 degrees in order to improve ease of future ADLs.  Progressing-Left shoulder flexion PROM - 132 degrees (8/6/19)   2. Patient will be able to begin AAROM exercises void of pain in order to improve ease of ADLs. Progressing - No significant increase in pain with AAROM exercises. 8/20/19   3. Patient will be able to improve right shoulder flexion AAROM to 140 degrees in order to improve functional mobility.  - Progressing AAROM shoulder flexion 100 degrees. 8/20/19  4. Patient will be able to improve left shoulder flexion AROM to 130 degrees in order to improve ease of reaching a shelf -Progressing 80 degrees.  8/20/19    PLAN  []  Upgrade activities as tolerated     [x]  Continue plan of care  []  Update interventions per flow sheet       []  Discharge due to:_  []  Other:_      Christopher Vidal PTA 9/18/2019  7:19 AM    Future Appointments   Date Time Provider Mary Jane Cho   9/18/2019  9:30 AM Elise Suarez PTA [facility]   10/15/2019  9:30 AM JOSE ARMANDO Rizzo [facility] SCHED   3/6/2020 10:05 AM [facility] NURSE VISIT [facility] SCHED   3/13/2020  9:15 AM Saray Duran MD [facility]

## 2019-09-24 ENCOUNTER — APPOINTMENT (OUTPATIENT)
Dept: PHYSICAL THERAPY | Age: 74
End: 2019-09-24
Payer: MEDICARE

## 2019-10-01 ENCOUNTER — APPOINTMENT (OUTPATIENT)
Dept: PHYSICAL THERAPY | Age: 74
End: 2019-10-01

## 2019-10-08 ENCOUNTER — APPOINTMENT (OUTPATIENT)
Dept: PHYSICAL THERAPY | Age: 74
End: 2019-10-08

## 2019-10-15 ENCOUNTER — APPOINTMENT (OUTPATIENT)
Dept: PHYSICAL THERAPY | Age: 74
End: 2019-10-15

## 2019-10-15 ENCOUNTER — OFFICE VISIT (OUTPATIENT)
Dept: ORTHOPEDIC SURGERY | Age: 74
End: 2019-10-15

## 2019-10-15 VITALS
HEART RATE: 87 BPM | HEIGHT: 66 IN | WEIGHT: 244 LBS | BODY MASS INDEX: 39.21 KG/M2 | SYSTOLIC BLOOD PRESSURE: 141 MMHG | TEMPERATURE: 98.3 F | RESPIRATION RATE: 16 BRPM | OXYGEN SATURATION: 94 % | DIASTOLIC BLOOD PRESSURE: 83 MMHG

## 2019-10-15 DIAGNOSIS — S42.252D CLOSED DISPLACED FRACTURE OF GREATER TUBEROSITY OF LEFT HUMERUS WITH ROUTINE HEALING, SUBSEQUENT ENCOUNTER: Primary | ICD-10-CM

## 2019-10-15 NOTE — PROGRESS NOTES
1. Have you been to the ER, urgent care clinic since your last visit? Hospitalized since your last visit? No    2. Have you seen or consulted any other health care providers outside of the [address] [place] [address] since your last visit? Include any pap smears or colon screening.  No

## 2019-10-15 NOTE — PROGRESS NOTES
Yunior Man  [id number]   Chief Complaint   Patient presents with    Shoulder Pain     left shoulder pain        HISTORY OF PRESENT ILLNESS  Yunior Man is a 75 y.o. female who presents today for evaluation of left shoulder pain. Patient fell in kitchen on 6/2/19. Today she states her pain is a 1/10. She is doing her therapy on her own now. Overall happy with her progress. Patient denies any fever, chills, chest pain, shortness of breath or calf pain. The remainder of the review of systems is negative. There are no new illness or injuries to report since last seen in the office. No changes in medications, allergies, social or family history. PHYSICAL EXAM:   Visit Vitals  /83   Pulse 87   Temp 98.3 °F (36.8 °C) (Oral)   Resp 16   Ht 5' 6.25\" (1.683 m)   Wt 244 lb (110.7 kg)   SpO2 94%   BMI 39.09 kg/m²     The patient is a well-developed, well-nourished female   in no acute distress. The patient is alert and oriented times three. The patient is alert and oriented times three. Mood and affect are normal.  LYMPHATIC: lymph nodes are not enlarged and are within normal limits  SKIN: normal in color and non tender to palpation. There are no bruises or abrasions noted. NEUROLOGICAL: Motor sensory exam is within normal limits. Reflexes are equal bilaterally.  There is normal sensation to pinprick and light touch  MUSCULOSKELETAL:  Examination Left shoulder   Skin Intact   AC joint tenderness -   Biceps tenderness -   Forward flexion/Elevation ROM 70   Active abduction ROM 60   Glenohumeral abduction 80   External rotation ROM 15   Internal rotation ROM 15   Apprehension -   Tomass Relocation -   Jerk -   Load and Shift -   Obriens -   Speeds -   Impingement sign -   Supraspinatus/Empty Can 4/5   External Rotation Strength -, 5/5   Lift Off/Belly Press -, 5/5   Neurovascular Intact     No ttp   able to reach overhead        IMPRESSION:      ICD-10-CM ICD-9-CM    1. Closed displaced fracture of greater tuberosity of left humerus with routine healing, subsequent encounter S42.252D V54.11 CANCELED: AMB POC XRAY, SHOULDER; COMPLETE, 2+        PLAN:   1. Patient with minimally proximal displaced greater tuberosity fracture s/p fall. Will cont with her PT exercises on home    Risk factors include: age, BMI>35  2. No cortisone injection indicated today   3. YES Physical/Occupational Therapy indicated today: HEP given today  4. No diagnostic test indicated today:   5. NO durable medical equipment indicated today   6. No referral indicated today   7. No medications indicated today:   8. No Narcotic indicated today     RTC PRN   Follow-up and Dispositions    · Return if symptoms worsen or fail to improve.            GEOVANY Hannah [facility]

## 2019-10-15 NOTE — PATIENT INSTRUCTIONS
Rotator Cuff: Exercises      Complete at least 2 sessions of each exercise each day to get started (no days off). If beneficial and able to fit into your schedule, more sessions are recommended. Nothing should cause an increase in pain or swelling. Pendulum swing    1. Hold on to a table or the back of a chair with your good arm. Then bend forward a little and let your sore arm hang straight down. This exercise does not use the arm muscles. Rather, use your legs and your hips to create movement that makes your arm swing freely. 2. Use the movement from your hips and legs to guide the slightly swinging arm back and forth like a pendulum (or elephant trunk). Then guide it in circles that start small (about the size of a dinner plate). Make the circles a bit larger each day, as your pain allows. 3. Do this exercise for 5 minutes. 4. As you have less pain, try bending over a little farther to do this exercise. This will increase the amount of movement at your shoulder. Posterior stretching exercise    1. Hold the elbow of your injured arm with your other hand. 2. Use your hand to pull your injured arm gently up and across your body. You will feel a gentle stretch across the back of your injured shoulder. 3. Hold for 30 seconds. Then slowly lower your arm. 4. Repeat 3 times. Up-the-back stretch    1. Put your hand in your back pocket. Let it rest there to stretch your shoulder. 2. With your other hand, hold your injured arm (palm outward) behind your back by the wrist. Pull your arm up gently to stretch your shoulder. 3. Next, put a towel over your other shoulder. Put the hand of your injured arm behind your back. Now hold the back end of the towel. With the other hand, hold the front end of the towel in front of your body. Pull gently on the front end of the towel. This will bring your hand farther up your back to stretch your shoulder. Hold for 30 seconds. 4. Repeat 3 times.        Overhead stretch    1. Standing about an arm's length away, grasp onto a solid surface. You could use a countertop, a doorknob, or the back of a sturdy chair. 2. With your knees slightly bent, bend forward with your arms straight. Lower your upper body, and let your shoulders stretch. 3. As your shoulders are able to stretch farther, you may need to take a step or two backward. 4. Hold for 30 seconds. Then stand up and relax. If you had stepped back during your stretch, step forward so you can keep your hands on the solid surface. 5. Repeat 3 times. Shoulder flexion (lying down)    1. Lie on your back, holding a wand with both hands. Your palms should face down as you hold the wand. 2. Keeping your elbows straight, slowly raise your arms over your head. Raise them until you feel a stretch in your shoulders, upper back, and chest.  3. Hold for 30 seconds. 4. Repeat 3 times. Shoulder rotation (lying down)    1. Lie on your back. Hold a wand with both hands with your elbows bent and palms up. 2. Keep your elbows close to your body, and move the wand across your body toward the sore arm. 3. Hold for 30 seconds. 4. Repeat 3 times. Wall climbing (to the side)    1. Stand with your side to a wall so that your fingers can just touch it at an angle about 30 degrees toward the front of your body. 2. Walk the fingers of your injured arm up the wall as high as pain permits. Try not to shrug your shoulder up toward your ear as you move your arm up. 3. Hold that position for 5 seconds. 4. Walk your fingers back down to the starting position. 5. Complete 10 times. Try to reach higher each time. 6. Repeat 3 times. Wall climbing (to the front)    1. Face a wall, and stand so your fingers can just touch it. 2. Keeping your shoulder down, walk the fingers of your injured arm up the wall as high as pain permits. (Don't shrug your shoulder up toward your ear.)  3.  Hold your arm in that position for at least 15 to 30 seconds. 4. Slowly walk your fingers back down to where you started. 5. Complete 10 times. Try to reach higher each time. 6. Repeat 3 times. Shoulder blade squeeze    1. Stand with your arms at your sides, and squeeze your shoulder blades together. Do not raise your shoulders up as you squeeze. 2. Hold 5 seconds. 3. Repeat 10 times, for 3 sets. Scapular Depressions      1. Start by squeezing your shoulder blades together. 2. Push your shoulder blades down, like you are trying to put them in your back pockets. 3. Hold 5 seconds. 4. Complete 10 times, for 3 sets. Seated Thoracic Rotation       1. In seated position, turn your shoulders slowly to the left through the full, pain free range of motion. Keep your shoulder blades back, as in the previous exercise, and keep your hips on the table. Only your shoulders and back should be moving. 2. Hold for 5 seconds at the end of the motion and slowly turn back to the starting position. 3. Complete 10 times. 4. Repeat steps 1-3 on the right side, for 3 sets each. If you have any questions, please call [facility] and ask for [address], Certified Athletic Trainer.

## 2019-10-22 ENCOUNTER — APPOINTMENT (OUTPATIENT)
Dept: PHYSICAL THERAPY | Age: 74
End: 2019-10-22

## 2019-10-25 ENCOUNTER — CLINICAL SUPPORT (OUTPATIENT)
Dept: INTERNAL MEDICINE CLINIC | Age: 74
End: 2019-10-25

## 2019-10-25 DIAGNOSIS — Z23 ENCOUNTER FOR IMMUNIZATION: ICD-10-CM

## 2019-10-25 NOTE — PATIENT INSTRUCTIONS
Vaccine Information Statement    Influenza (Flu) Vaccine (Inactivated or Recombinant): What You Need to Know    Many Vaccine Information Statements are available in Persian and other languages. See www.immunize.org/vis  Hojas de información sobre vacunas están disponibles en español y en muchos otros idiomas. Visite www.immunize.org/vis    1. Why get vaccinated? Influenza vaccine can prevent influenza (flu). Flu is a contagious disease that spreads around the [facility] every year, usually between October and May. Anyone can get the flu, but it is more dangerous for some people. Infants and young children, people 72years of age and older, pregnant women, and people with certain health conditions or a weakened immune system are at greatest risk of flu complications. Pneumonia, bronchitis, sinus infections and ear infections are examples of flu-related complications. If you have a medical condition, such as heart disease, cancer or diabetes, flu can make it worse. Flu can cause fever and chills, sore throat, muscle aches, fatigue, cough, headache, and runny or stuffy nose. Some people may have vomiting and diarrhea, though this is more common in children than adults. Each year thousands of people in the [facility] die from flu, and many more are hospitalized. Flu vaccine prevents millions of illnesses and flu-related visits to the doctor each year. 2. Influenza vaccines     CDC recommends everyone 10months of age and older get vaccinated every flu season. Children 6 months through 6years of age may need 2 doses during a single flu season. Everyone else needs only 1 dose each flu season. It takes about 2 weeks for protection to develop after vaccination. There are many flu viruses, and they are always changing. Each year a new flu vaccine is made to protect against three or four viruses that are likely to cause disease in the upcoming flu season.  Even when the vaccine doesnt exactly match these viruses, it may still provide some protection. Influenza vaccine does not cause flu. Influenza vaccine may be given at the same time as other vaccines. 3. Talk with your health care provider    Tell your vaccine provider if the person getting the vaccine:   Has had an allergic reaction after a previous dose of influenza vaccine, or has any severe, life-threatening allergies.  Has ever had Guillain-Barré Syndrome (also called GBS). In some cases, your health care provider may decide to postpone influenza vaccination to a future visit. People with minor illnesses, such as a cold, may be vaccinated. People who are moderately or severely ill should usually wait until they recover before getting influenza vaccine. Your health care provider can give you more information. 4. Risks of a reaction     Soreness, redness, and swelling where shot is given, fever, muscle aches, and headache can happen after influenza vaccine.  There may be a very small increased risk of Guillain-Barré Syndrome (GBS) after inactivated influenza vaccine (the flu shot). [place] children who get the flu shot along with pneumococcal vaccine (PCV13), and/or DTaP vaccine at the same time might be slightly more likely to have a seizure caused by fever. Tell your health care provider if a child who is getting flu vaccine has ever had a seizure. People sometimes faint after medical procedures, including vaccination. Tell your provider if you feel dizzy or have vision changes or ringing in the ears. As with any medicine, there is a very remote chance of a vaccine causing a severe allergic reaction, other serious injury, or death. 5. What if there is a serious problem? An allergic reaction could occur after the vaccinated person leaves the clinic.  If you see signs of a severe allergic reaction (hives, swelling of the face and throat, difficulty breathing, a fast heartbeat, dizziness, or weakness), call 9-1-1 and get the person to the nearest hospital.    For other signs that concern you, call your health care provider. Adverse reactions should be reported to the Vaccine Adverse Event Reporting System (VAERS). Your health care provider will usually file this report, or you can do it yourself. Visit the VAERS website at www.vaers. [facility].gov or call 5-224.789.3545. VAERS is only for reporting reactions, and VAERS staff do not give medical advice. 6. The National Vaccine Injury Compensation Program    The [facility] Vaccine Injury Compensation Program (VICP) is a federal program that was created to compensate people who may have been injured by certain vaccines. Visit the VICP website at www.[facility].gov/vaccinecompensation or call 8-759.450.5397 to learn about the program and about filing a claim. There is a time limit to file a claim for compensation. 7. How can I learn more?  Ask your health care provider.  Call your local or state health department.  Contact the Centers for Disease Control and Prevention (CDC):  - Call 6-840.315.2331 (9-566-DQY-INFO) or  - Visit CDCs influenza website at www.cdc.gov/flu    Vaccine Information Statement (Interim)  Inactivated Influenza Vaccine   8/15/2019  42 BERNABE Carrington 566AL-54   Department of Health and Human Services  Centers for Disease Control and Prevention    Office Use Only

## 2019-10-29 ENCOUNTER — APPOINTMENT (OUTPATIENT)
Dept: PHYSICAL THERAPY | Age: 74
End: 2019-10-29

## 2019-11-19 NOTE — PROGRESS NOTES
[facility]  [address]  [place], [address]  [phone] fax    Physical Therapy Discharge Summary    Patient name: Lucy Dee Start of Care: 2019   Referral source: [place], [place] : 1945               Medical Diagnosis: Left shoulder pain [M25.512]  Displaced fracture of greater tuberosity of left humerus, initial encounter for closed fracture [S42.252A]  Payor: Cullen Gonzalez / Plan: [address] [place] [address] HMO / Product Type: Managed Care Medicare /  Onset Date:19               Treatment Diagnosis: Left shoulder pain s/p left proximal humerus fracture   Prior Hospitalization: see medical history Provider#: [id number]   Medications: Verified on Patient summary List    Comorbidities: arthritis, COPD/emphysema   Prior Level of Function: (I) with ADLs. Visits from Start of Care: 17    Missed Visits: 0  Reporting Period : 19 to 19        Progress towards goals:   1. Patient will be able to improve left shoulder flexion PROM to 140 degrees in order to improve ease of future ADLs.  Progressing-Left shoulder flexion PROM - 132 degrees    2. Patient will be able to begin AAROM exercises void of pain in order to improve ease of ADLs. Progressing - No significant increase in pain with AAROM exercises. 3. Patient will be able to improve right shoulder flexion AAROM to 140 degrees in order to improve functional mobility.  - Progressing AAROM shoulder flexion 100 degrees. 4. Patient will be able to improve left shoulder flexion AROM to 130 degrees in order to improve ease of reaching a shelf -Progressing 80 degrees. ASSESSMENT/RECOMMENDATIONS: Unable to further assess progress towards goals at this time due to non-compliance/lack of attendance. DC at this time with no further instructions to the patient.   Thank you for this referral.    [x]Discontinue therapy: []Patient has reached or is progressing toward set goals      [x]Patient is non-compliant or has abdicated      []Due to lack of appreciable progress towards set Mary Jane Liu, AGUEDA 11/19/2019 1:03 PM

## 2020-01-20 DIAGNOSIS — J44.9 CHRONIC OBSTRUCTIVE PULMONARY DISEASE, UNSPECIFIED COPD TYPE (HCC): ICD-10-CM

## 2020-01-23 NOTE — TELEPHONE ENCOUNTER
Last Visit: 09/12/2019 with MD Phan Choudhury  Next Appointment: 03/13/2020 with MD Phan Choudhury  Previous Refill Encounter(s): 04/19/2019 per SNEHA Varma 1 inhaler 3R    Requested Prescriptions     Pending Prescriptions Disp Refills    umeclidinium (INCRUSE ELLIPTA) 62.5 mcg/actuation inhaler [Pharmacy Med Name: Incruse Ellipta 62.5 MCG/INH Inhalation Aerosol Powder Breath Activated] 1 Inhaler 0     Sig: INHALE 1 PUFF BY MOUTH ONCE DAILY

## 2020-03-07 LAB
ALBUMIN/CREAT UR: 12 MG/G CREAT (ref 0–29)
APPEARANCE UR: ABNORMAL
BACTERIA #/AREA URNS HPF: ABNORMAL /[HPF]
BASOPHILS # BLD AUTO: 0 X10E3/UL (ref 0–0.2)
BASOPHILS NFR BLD AUTO: 1 %
BILIRUB UR QL STRIP: NEGATIVE
BUN SERPL-MCNC: 17 MG/DL (ref 8–27)
BUN/CREAT SERPL: 22 (ref 12–28)
CALCIUM SERPL-MCNC: 9.6 MG/DL (ref 8.7–10.3)
CASTS URNS QL MICRO: ABNORMAL /LPF
CHLORIDE SERPL-SCNC: 105 MMOL/L (ref 96–106)
CHOLEST SERPL-MCNC: 157 MG/DL (ref 100–199)
CO2 SERPL-SCNC: 24 MMOL/L (ref 20–29)
COLOR UR: YELLOW
CREAT SERPL-MCNC: 0.78 MG/DL (ref 0.57–1)
CREAT UR-MCNC: 55.3 MG/DL
EOSINOPHIL # BLD AUTO: 0.1 X10E3/UL (ref 0–0.4)
EOSINOPHIL NFR BLD AUTO: 2 %
EPI CELLS #/AREA URNS HPF: ABNORMAL /HPF (ref 0–10)
ERYTHROCYTE [DISTWIDTH] IN BLOOD BY AUTOMATED COUNT: 12.7 % (ref 11.7–15.4)
GLUCOSE SERPL-MCNC: 95 MG/DL (ref 65–99)
GLUCOSE UR QL: NEGATIVE
HCT VFR BLD AUTO: 38.3 % (ref 34–46.6)
HDLC SERPL-MCNC: 59 MG/DL
HGB BLD-MCNC: 13.6 G/DL (ref 11.1–15.9)
HGB UR QL STRIP: NEGATIVE
IMM GRANULOCYTES # BLD AUTO: 0 X10E3/UL (ref 0–0.1)
IMM GRANULOCYTES NFR BLD AUTO: 0 %
INTERPRETATION, 910389: NORMAL
KETONES UR QL STRIP: NEGATIVE
LDLC SERPL CALC-MCNC: 82 MG/DL (ref 0–99)
LEUKOCYTE ESTERASE UR QL STRIP: ABNORMAL
LYMPHOCYTES # BLD AUTO: 2 X10E3/UL (ref 0.7–3.1)
LYMPHOCYTES NFR BLD AUTO: 31 %
MCH RBC QN AUTO: 32.4 PG (ref 26.6–33)
MCHC RBC AUTO-ENTMCNC: 35.5 G/DL (ref 31.5–35.7)
MCV RBC AUTO: 91 FL (ref 79–97)
MICRO URNS: ABNORMAL
MICROALBUMIN UR-MCNC: 6.4 UG/ML
MONOCYTES # BLD AUTO: 0.6 X10E3/UL (ref 0.1–0.9)
MONOCYTES NFR BLD AUTO: 9 %
MUCOUS THREADS URNS QL MICRO: PRESENT
NEUTROPHILS # BLD AUTO: 3.6 X10E3/UL (ref 1.4–7)
NEUTROPHILS NFR BLD AUTO: 57 %
NITRITE UR QL STRIP: POSITIVE
PH UR STRIP: 6.5 [PH] (ref 5–7.5)
PLATELET # BLD AUTO: 205 X10E3/UL (ref 150–450)
POTASSIUM SERPL-SCNC: 4.5 MMOL/L (ref 3.5–5.2)
PROT UR QL STRIP: NEGATIVE
RBC # BLD AUTO: 4.2 X10E6/UL (ref 3.77–5.28)
RBC #/AREA URNS HPF: ABNORMAL /HPF (ref 0–2)
SODIUM SERPL-SCNC: 143 MMOL/L (ref 134–144)
SP GR UR: 1.01 (ref 1–1.03)
T4 FREE SERPL-MCNC: 1.21 NG/DL (ref 0.82–1.77)
TRIGL SERPL-MCNC: 78 MG/DL (ref 0–149)
TSH SERPL DL<=0.005 MIU/L-ACNC: 4.84 UIU/ML (ref 0.45–4.5)
UROBILINOGEN UR STRIP-MCNC: 0.2 MG/DL (ref 0.2–1)
VLDLC SERPL CALC-MCNC: 16 MG/DL (ref 5–40)
WBC # BLD AUTO: 6.3 X10E3/UL (ref 3.4–10.8)
WBC #/AREA URNS HPF: >30 /HPF (ref 0–5)

## 2020-03-09 ENCOUNTER — TELEPHONE (OUTPATIENT)
Dept: INTERNAL MEDICINE CLINIC | Age: 75
End: 2020-03-09

## 2020-03-09 NOTE — PROGRESS NOTES
Is she having urinary sx?     Dr. Nasrin Krause  [facility], [facility], [address]  Phone: [phone]  Fax: [phone]

## 2020-03-09 NOTE — TELEPHONE ENCOUNTER
----- Message from Perlita Martínez MD sent at 3/9/2020  3:48 PM EDT -----  Is she having urinary sx?     Dr. Ariane Pan  [facility] [address], [address] [place] [address] Str.  Phone: [phone]  Fax: [phone]

## 2020-03-09 NOTE — TELEPHONE ENCOUNTER
I called the patient to ask about SX of a UTI? The patient did admit to having slight vaginal irritation and possibly more urine frequency than normal, but wants to wait to be treated /evaluated on Friday's office visit with Dr. Giovanni Connors.

## 2020-03-13 ENCOUNTER — OFFICE VISIT (OUTPATIENT)
Dept: INTERNAL MEDICINE CLINIC | Age: 75
End: 2020-03-13

## 2020-03-13 VITALS
HEART RATE: 86 BPM | HEIGHT: 66 IN | TEMPERATURE: 98 F | BODY MASS INDEX: 39.57 KG/M2 | OXYGEN SATURATION: 96 % | WEIGHT: 246.2 LBS | DIASTOLIC BLOOD PRESSURE: 52 MMHG | SYSTOLIC BLOOD PRESSURE: 123 MMHG | RESPIRATION RATE: 12 BRPM

## 2020-03-13 DIAGNOSIS — G31.84 MILD COGNITIVE IMPAIRMENT: ICD-10-CM

## 2020-03-13 DIAGNOSIS — E66.01 SEVERE OBESITY (HCC): ICD-10-CM

## 2020-03-13 DIAGNOSIS — E03.9 HYPOTHYROIDISM, UNSPECIFIED TYPE: ICD-10-CM

## 2020-03-13 DIAGNOSIS — E78.00 HIGH CHOLESTEROL: ICD-10-CM

## 2020-03-13 DIAGNOSIS — N30.00 ACUTE CYSTITIS WITHOUT HEMATURIA: Primary | ICD-10-CM

## 2020-03-13 LAB
BILIRUB UR QL STRIP: NEGATIVE
GLUCOSE UR-MCNC: NEGATIVE MG/DL
KETONES P FAST UR STRIP-MCNC: NEGATIVE MG/DL
PH UR STRIP: 7 [PH] (ref 4.6–8)
PROT UR QL STRIP: NEGATIVE
SP GR UR STRIP: 1.01 (ref 1–1.03)
UA UROBILINOGEN AMB POC: NORMAL (ref 0.2–1)
URINALYSIS CLARITY POC: NORMAL
URINALYSIS COLOR POC: YELLOW
URINE BLOOD POC: NORMAL
URINE LEUKOCYTES POC: NORMAL
URINE NITRITES POC: NEGATIVE

## 2020-03-13 RX ORDER — LEVOTHYROXINE SODIUM 125 UG/1
125 TABLET ORAL
Qty: 90 TAB | Refills: 3 | Status: CANCELLED | OUTPATIENT
Start: 2020-03-13

## 2020-03-13 RX ORDER — CIPROFLOXACIN 500 MG/1
500 TABLET ORAL DAILY
Qty: 3 TAB | Refills: 0 | Status: SHIPPED | OUTPATIENT
Start: 2020-03-13 | End: 2022-04-19 | Stop reason: SDUPTHER

## 2020-03-13 NOTE — PATIENT INSTRUCTIONS
Health Maintenance Due Topic Date Due  
 DTaP/Tdap/Td series (1 - Tdap) 1965  Shingrix Vaccine Age 50> (1 of 2) 1994  GLAUCOMA SCREENING Q2Y  2009 Thyroid-Stimulating Hormone (TSH) Test: About This Test 
What is it? A thyroid-stimulating hormone (TSH) test is one of several blood tests used to check for thyroid gland problems. TSH causes the thyroid gland to make other important hormones that help control your body's metabolism. Why is this test done? This test is done to: · Find out if the thyroid gland is working as it should. · Find out if a problem with the thyroid is causing symptoms such as growth problems, tiredness, weight gain, or weight loss. · Keep track of how well thyroid treatment is working. · Test a  to find out if his or her thyroid gland is working as it should. How do you prepare for the test? 
In general, there's nothing you have to do before this test, unless your doctor tells you to. How is the test done? A health professional uses a needle to take a blood sample, usually from the arm. How long does the test take? The test will take a few minutes. What happens after the test? 
· You will probably be able to go home right away. · You can go back to your usual activities right away. Follow-up care is a key part of your treatment and safety. Be sure to make and go to all appointments, and call your doctor if you are having problems. It's also a good idea to keep a list of the medicines you take. Ask your doctor when you can expect to have your test results. Where can you learn more? Go to http://radha-lanette.info/ Enter S556 in the search box to learn more about \"Thyroid-Stimulating Hormone (TSH) Test: About This Test.\" Current as of: 2019Content Version: 12.4 © 9296-1636 Healthwise[employer].  
Care instructions adapted under license by [employer] (which disclaims liability or warranty for this information). If you have questions about a medical condition or this instruction, always ask your healthcare professional. [place] [address] any warranty or liability for your use of this information. Results for Rod Vargas (MRN [id number]) as of 3/13/2020 09:20 Ref. Range 3/6/2020 09:58 T4, Free Latest Ref Range: 0.82 - 1.77 ng/dL 1.21  
TSH Latest Ref Range: 0.450 - 4.500 uIU/mL 4.840 (H)

## 2020-03-13 NOTE — PROGRESS NOTES
Anirudh Ruelas presents today for   Chief Complaint   Patient presents with    Hypothyroidism     follow up   ROOM  12    Labs     done 03-      Patient states she has no dysuria, but there is a Strong Urine Odor, and she will get up at night 3-4 times. Is someone accompanying this pt? no    Is the patient using any DME equipment during [address]? no    Depression Screening:  3 most recent PHQ Screens 3/13/2020   PHQ Not Done -   Little interest or pleasure in doing things Not at all   Feeling down, depressed, irritable, or hopeless Not at all   Total Score PHQ 2 0       Learning Assessment:  Learning Assessment 3/13/2020   PRIMARY LEARNER Patient   HIGHEST LEVEL OF EDUCATION - PRIMARY LEARNER  [id number] [address] PRIMARY LEARNER NONE   CO-LEARNER CAREGIVER No   PRIMARY LANGUAGE ENGLISH   LEARNER PREFERENCE PRIMARY DEMONSTRATION   ANSWERED BY patient   RELATIONSHIP SELF       Abuse Screening:  Abuse Screening Questionnaire 3/13/2020   Do you ever feel afraid of your partner? N   Are you in a relationship with someone who physically or mentally threatens you? N   Is it safe for you to go home? Y       Fall Risk  Fall Risk Assessment, last 12 mths 3/13/2020   Able to walk? Yes   Fall in past 12 months? Yes   Fall with injury? Yes   Number of falls in past 12 months 1   Fall Risk Score 2       Health Maintenance reviewed and discussed and ordered per Provider. Health Maintenance Due   Topic Date Due    DTaP/Tdap/Td series (1 - Tdap) 11/14/1965    Shingrix Vaccine Age 50> (1 of 2) 11/14/1994    GLAUCOMA SCREENING Q2Y  11/14/2009       Coordination of Care:  1. Have you been to the ER, urgent care clinic since your last visit? Hospitalized since your last visit? no    2. Have you seen or consulted any other health care providers outside of the [address] [place] [address] since your last visit? Include any pap smears or colon screening.  no

## 2020-03-13 NOTE — PROGRESS NOTES
INTERNISTS OF [facility]:  3/13/2020, MRN: [id number]      Vicky Singh is a 75 y.o. female and presents to clinic for Hypothyroidism (follow up   ROOM  12) and Labs (done 03- )    Subjective: The pt is a 74yo female with h/o obesity, hypothyroidism, HLD, COPD?, GERD, lumbar disc disease, vitamin D deficiency, and lichen planus.     1. Mild Cognitive Impairment: She was unable to complete the clock drawing exercise successfully at her last appointment. Today, she is able to complete her clock drawing exercise. 2. Urinary Sx: Her most recent labs show that her urinalysis is positive for leukocyte esterase. She also has mucus in the urine, suggestive of contamination. Note that her labs at that time showed a normal CBC. Today she reports: 1 wk of lower abdominal pain/cramping off/on. No dysuria. She has urinary frequency. 3. Hypothyroidism: Her most recent labs show that her TSH is elevated at 4.84. She is on Synthroid 112 mcg daily 6 days a week. Results for Clay Marmolejo (MRN [id number]) as of 3/13/2020 09:20   Ref. Range 3/6/2020 09:58   T4, Free Latest Ref Range: 0.82 - 1.77 ng/dL 1.21   TSH Latest Ref Range: 0.450 - 4.500 uIU/mL 4.840 (H)     4. HLD: Taking simvastatin. No adverse side effects with taking his medicine. Her LDL and recently checked was 82. Her total cholesterol was 157. Her HDL was 59. Her triglycerides were 78. She is not dieting. Patient Active Problem List    Diagnosis Date Noted    Lumbar disc disease 09/12/2019    Vitamin D deficiency 09/12/2019    Severe obesity ([address] 04/21/2019    Hypothyroidism     High cholesterol     COPD, mild (HCC)     Acid reflux     Lichen planus        Current Outpatient Medications   Medication Sig Dispense Refill    umeclidinium (INCRUSE ELLIPTA) 62.5 mcg/actuation inhaler INHALE 1 PUFF BY MOUTH ONCE DAILY 1 Inhaler 2    cholecalciferol, vitamin D3, (VITAMIN D3) 2,000 unit tab Take 2,000 Units by mouth daily.       levothyroxine (SYNTHROID) 112 mcg tablet Take 1 Tab by mouth Daily (before breakfast). 90 Tab 3    simvastatin (ZOCOR) 20 mg tablet Take 1 Tab by mouth nightly. 90 Tab 3    clobetasol (TEMOVATE) 0.05 % topical gel Apply  to affected area as needed (mouth sores). 15 g 3    aspirin delayed-release 81 mg tablet Take 81 mg by mouth daily.  multivitamin (ONE A DAY) tablet Take 1 Tab by mouth daily. Allergies   Allergen Reactions    Penicillins Rash       Past Medical History:   Diagnosis Date    Acid reflux     COPD, mild (HCC)     High cholesterol     Hypothyroidism     Joint pain     Joint swelling     Lichen planus     Sinus problem     Urinary frequency        Past Surgical History:   Procedure Laterality Date    HX KNEE REPLACEMENT Bilateral     left 2013, right 2016    HX PARTIAL HYSTERECTOMY  1967       Family History   Problem Relation Age of Onset    Heart Disease Mother     Hypertension Mother     No Known Problems Father     Breast Cancer Paternal Aunt     Diabetes Maternal Grandmother     Cancer Brother         pancreatic cancer       Social History     Tobacco Use    Smoking status: Former Smoker     Packs/day: 0.50     Years: 40.00     Pack years: 20.00     Types: Cigarettes     Last attempt to quit: 4/19/2009     Years since quitting: 10.9    Smokeless tobacco: Never Used   Substance Use Topics    Alcohol use: Not Currently       ROS   Review of Systems   Constitutional: Negative for chills and fever. HENT: Negative for ear pain and sore throat. Eyes: Negative for blurred vision and pain. Respiratory: Negative for shortness of breath. Cardiovascular: Negative for chest pain. Gastrointestinal: Positive for abdominal pain. Negative for blood in stool and melena. Genitourinary: Positive for frequency. Negative for dysuria and hematuria. Musculoskeletal: Negative for joint pain and myalgias. Skin: Negative for rash.    Neurological: Negative for tingling, focal weakness and headaches. Endo/Heme/Allergies: Does not bruise/bleed easily. Psychiatric/Behavioral: Negative for substance abuse. Objective     Vitals:    03/13/20 0913   BP: 123/52   Pulse: 86   Resp: 12   Temp: 98 °F (36.7 °C)   TempSrc: Oral   SpO2: 96%   Weight: 246 lb 3.2 oz (111.7 kg)   Height: 5' 6.25\" (1.683 m)   PainSc:   2   PainLoc: Hand       Physical Exam  Vitals signs and nursing note reviewed. HENT:      Head: Normocephalic and atraumatic. Right Ear: External ear normal.      Left Ear: External ear normal.      Nose: Nose normal.      Mouth/Throat:      Pharynx: No oropharyngeal exudate or posterior oropharyngeal erythema. Eyes:      General: No scleral icterus. Right eye: No discharge. Left eye: No discharge. Conjunctiva/sclera: Conjunctivae normal.   Neck:      Musculoskeletal: Neck supple. Cardiovascular:      Rate and Rhythm: Normal rate and regular rhythm. Heart sounds: Normal heart sounds. No murmur. No friction rub. No gallop. Pulmonary:      Effort: Pulmonary effort is normal. No respiratory distress. Breath sounds: Normal breath sounds. No wheezing or rales. Chest:      Chest wall: No tenderness. Abdominal:      General: Bowel sounds are normal. There is no distension. Palpations: Abdomen is soft. There is no mass. Tenderness: There is abdominal tenderness (Ttp along her suprapubic area). There is no guarding or rebound. Musculoskeletal:         General: No swelling (Bue) or tenderness (Bue). Lymphadenopathy:      Cervical: No cervical adenopathy. Skin:     General: Skin is warm and dry. Findings: No erythema. Neurological:      Mental Status: She is alert and oriented to person, place, and time. Motor: No abnormal muscle tone. Gait: Gait is intact.  Gait normal.   Psychiatric:         Mood and Affect: Mood and affect normal.         LABS   Data Review:   Lab Results   Component Value Date/Time WBC 6.3 03/06/2020 09:58 AM    HGB 13.6 03/06/2020 09:58 AM    HCT 38.3 03/06/2020 09:58 AM    PLATELET 681 03/01/7507 09:58 AM    MCV 91 03/06/2020 09:58 AM       Lab Results   Component Value Date/Time    Sodium 143 03/06/2020 09:58 AM    Potassium 4.5 03/06/2020 09:58 AM    Chloride 105 03/06/2020 09:58 AM    CO2 24 03/06/2020 09:58 AM    Glucose 95 03/06/2020 09:58 AM    BUN 17 03/06/2020 09:58 AM    Creatinine 0.78 03/06/2020 09:58 AM    BUN/Creatinine ratio 22 03/06/2020 09:58 AM    GFR est AA 86 03/06/2020 09:58 AM    GFR est non-AA 75 03/06/2020 09:58 AM    Calcium 9.6 03/06/2020 09:58 AM       Lab Results   Component Value Date/Time    Cholesterol, total 157 03/06/2020 09:58 AM    HDL Cholesterol 59 03/06/2020 09:58 AM    LDL, calculated 82 03/06/2020 09:58 AM    VLDL, calculated 16 03/06/2020 09:58 AM    Triglyceride 78 03/06/2020 09:58 AM       Lab Results   Component Value Date/Time    Hemoglobin A1c 5.3 04/23/2019 10:30 AM       Assessment/Plan:   1. Hypothyroidism: Her TSH was elevated but in the setting of a UTI. Continue with Synthroid for now we will recheck TFTs in 4 weeks. ORDERS:  - TSH AND FREE T4; Future    2. HDL: Stable. I encouraged her to reduce her weight by limiting her processed food intake. I will recheck her weight at her follow-up appointment. Continue with Rx as prescribed. 3. Acute cystitis without hematuria: Stable. Ordering ciprofloxacin course. We will culture her urine. ORDERS:  - ciprofloxacin HCl (CIPRO) 500 mg tablet; Take 1 Tab by mouth daily for 3 days. Dispense: 3 Tab; Refill: 0    4. Mild Cognitive Impairment: Observation. Health Maintenance Due   Topic Date Due    DTaP/Tdap/Td series (1 - Tdap) 11/14/1965    Shingrix Vaccine Age 50> (1 of 2) 11/14/1994    GLAUCOMA SCREENING Q2Y  11/14/2009     Lab review: labs are reviewed in the EHR and ordered as mentioned above.     I have discussed the diagnosis with the patient and the intended plan as seen in the above orders. The patient has received an after-visit summary and questions were answered concerning future plans. I have discussed medication side effects and warnings with the patient as well. I have reviewed the plan of care with the patient, accepted their input and they are in agreement with the treatment goals. All questions were answered. The patient understands the plan of care. Handouts provided today with above information. Pt instructed if symptoms worsen to call the office or report to the ED for continued care. Greater than 50% of the visit time was spent in counseling and/or coordination of care. Voice recognition was used to generate this report, which may have resulted in some phonetic based errors in grammar and contents. Even though attempts were made to correct all the mistakes, some may have been missed, and remained in the body of the document.           Akash Curry MD

## 2020-03-15 LAB — BACTERIA UR CULT: ABNORMAL

## 2020-04-10 ENCOUNTER — APPOINTMENT (OUTPATIENT)
Dept: INTERNAL MEDICINE CLINIC | Age: 75
End: 2020-04-10

## 2020-04-11 LAB
T4 FREE SERPL-MCNC: 1.44 NG/DL (ref 0.82–1.77)
TSH SERPL DL<=0.005 MIU/L-ACNC: 2.23 UIU/ML (ref 0.45–4.5)

## 2020-04-13 ENCOUNTER — TELEPHONE (OUTPATIENT)
Dept: INTERNAL MEDICINE CLINIC | Age: 75
End: 2020-04-13

## 2020-04-13 NOTE — PROGRESS NOTES
Please let her know that her TFTs are normal. She should continue taking rx as prescribed.     Dr. Nicki Galindo  [facility] of [facility], [facility], [address] [place] [address] Str.  Phone: [phone]  Fax: [phone]

## 2020-04-13 NOTE — TELEPHONE ENCOUNTER
----- Message from Margot Dodson MD sent at 4/13/2020  8:12 AM EDT -----  Please let her know that her TFTs are normal. She should continue taking rx as prescribed.     Dr. Raul Garcia  [facility] of [facility], [address] [place] [address], [address] [place] [address] Str.  Phone: [phone]  Fax: [phone]

## 2020-10-02 ENCOUNTER — APPOINTMENT (OUTPATIENT)
Dept: INTERNAL MEDICINE CLINIC | Age: 75
End: 2020-10-02

## 2020-10-02 DIAGNOSIS — E78.00 HIGH CHOLESTEROL: ICD-10-CM

## 2020-10-02 DIAGNOSIS — E03.9 HYPOTHYROIDISM, UNSPECIFIED TYPE: Primary | ICD-10-CM

## 2020-10-02 DIAGNOSIS — E03.9 HYPOTHYROIDISM, UNSPECIFIED TYPE: ICD-10-CM

## 2020-10-03 LAB
ALBUMIN SERPL-MCNC: 4.1 G/DL (ref 3.7–4.7)
ALBUMIN/GLOB SERPL: 1.5 {RATIO} (ref 1.2–2.2)
ALP SERPL-CCNC: 91 IU/L (ref 39–117)
ALT SERPL-CCNC: 20 IU/L (ref 0–32)
AST SERPL-CCNC: 27 IU/L (ref 0–40)
BASOPHILS # BLD AUTO: 0 X10E3/UL (ref 0–0.2)
BASOPHILS NFR BLD AUTO: 1 %
BILIRUB SERPL-MCNC: 0.5 MG/DL (ref 0–1.2)
BUN SERPL-MCNC: 12 MG/DL (ref 8–27)
BUN/CREAT SERPL: 16 (ref 12–28)
CALCIUM SERPL-MCNC: 9.2 MG/DL (ref 8.7–10.3)
CHLORIDE SERPL-SCNC: 104 MMOL/L (ref 96–106)
CHOLEST SERPL-MCNC: 144 MG/DL (ref 100–199)
CO2 SERPL-SCNC: 25 MMOL/L (ref 20–29)
CREAT SERPL-MCNC: 0.75 MG/DL (ref 0.57–1)
EOSINOPHIL # BLD AUTO: 0.1 X10E3/UL (ref 0–0.4)
EOSINOPHIL NFR BLD AUTO: 2 %
ERYTHROCYTE [DISTWIDTH] IN BLOOD BY AUTOMATED COUNT: 13.3 % (ref 11.7–15.4)
GLOBULIN SER CALC-MCNC: 2.8 G/DL (ref 1.5–4.5)
GLUCOSE SERPL-MCNC: 94 MG/DL (ref 65–99)
HCT VFR BLD AUTO: 39.5 % (ref 34–46.6)
HDLC SERPL-MCNC: 57 MG/DL
HGB BLD-MCNC: 13.3 G/DL (ref 11.1–15.9)
IMM GRANULOCYTES # BLD AUTO: 0 X10E3/UL (ref 0–0.1)
IMM GRANULOCYTES NFR BLD AUTO: 0 %
INTERPRETATION, 910389: NORMAL
LDLC SERPL CALC-MCNC: 73 MG/DL (ref 0–99)
LYMPHOCYTES # BLD AUTO: 1.4 X10E3/UL (ref 0.7–3.1)
LYMPHOCYTES NFR BLD AUTO: 27 %
MCH RBC QN AUTO: 31.8 PG (ref 26.6–33)
MCHC RBC AUTO-ENTMCNC: 33.7 G/DL (ref 31.5–35.7)
MCV RBC AUTO: 95 FL (ref 79–97)
MONOCYTES # BLD AUTO: 0.5 X10E3/UL (ref 0.1–0.9)
MONOCYTES NFR BLD AUTO: 9 %
NEUTROPHILS # BLD AUTO: 3.1 X10E3/UL (ref 1.4–7)
NEUTROPHILS NFR BLD AUTO: 61 %
PLATELET # BLD AUTO: 175 X10E3/UL (ref 150–450)
POTASSIUM SERPL-SCNC: 4.4 MMOL/L (ref 3.5–5.2)
PROT SERPL-MCNC: 6.9 G/DL (ref 6–8.5)
RBC # BLD AUTO: 4.18 X10E6/UL (ref 3.77–5.28)
SODIUM SERPL-SCNC: 142 MMOL/L (ref 134–144)
T4 FREE SERPL-MCNC: 1.65 NG/DL (ref 0.82–1.77)
TRIGL SERPL-MCNC: 67 MG/DL (ref 0–149)
TSH SERPL DL<=0.005 MIU/L-ACNC: 1.42 UIU/ML (ref 0.45–4.5)
VLDLC SERPL CALC-MCNC: 14 MG/DL (ref 5–40)
WBC # BLD AUTO: 5.1 X10E3/UL (ref 3.4–10.8)

## 2020-10-07 NOTE — PROGRESS NOTES
I will discuss her results with her at her upcoming apt. Her TFTs, CMP, and CBC are WNL. Her total cholesterol is 144. Triglycerides are 67. HDL is 57. LDL is 73.      Dr. Wu Larsen  [facility]ists of [facility], [address] [place] [address], [address] [place], [place] [address] [address]  Phone: [phone]  Fax: [phone]

## 2020-10-13 ENCOUNTER — VIRTUAL VISIT (OUTPATIENT)
Dept: INTERNAL MEDICINE CLINIC | Age: 75
End: 2020-10-13
Payer: MEDICARE

## 2020-10-13 DIAGNOSIS — Z12.31 ENCOUNTER FOR SCREENING MAMMOGRAM FOR BREAST CANCER: ICD-10-CM

## 2020-10-13 DIAGNOSIS — E78.00 HIGH CHOLESTEROL: ICD-10-CM

## 2020-10-13 DIAGNOSIS — Z00.00 MEDICARE ANNUAL WELLNESS VISIT, SUBSEQUENT: ICD-10-CM

## 2020-10-13 DIAGNOSIS — Z78.0 POSTMENOPAUSAL: ICD-10-CM

## 2020-10-13 DIAGNOSIS — E03.9 HYPOTHYROIDISM, UNSPECIFIED TYPE: Primary | ICD-10-CM

## 2020-10-13 PROCEDURE — G8399 PT W/DXA RESULTS DOCUMENT: HCPCS | Performed by: INTERNAL MEDICINE

## 2020-10-13 PROCEDURE — G8432 DEP SCR NOT DOC, RNG: HCPCS | Performed by: INTERNAL MEDICINE

## 2020-10-13 PROCEDURE — 99441 PR PHYS/QHP TELEPHONE EVALUATION 5-10 MIN: CPT | Performed by: INTERNAL MEDICINE

## 2020-10-13 PROCEDURE — 1100F PTFALLS ASSESS-DOCD GE2>/YR: CPT | Performed by: INTERNAL MEDICINE

## 2020-10-13 PROCEDURE — G8417 CALC BMI ABV UP PARAM F/U: HCPCS | Performed by: INTERNAL MEDICINE

## 2020-10-13 PROCEDURE — G8427 DOCREV CUR MEDS BY ELIG CLIN: HCPCS | Performed by: INTERNAL MEDICINE

## 2020-10-13 PROCEDURE — 3017F COLORECTAL CA SCREEN DOC REV: CPT | Performed by: INTERNAL MEDICINE

## 2020-10-13 PROCEDURE — 3288F FALL RISK ASSESSMENT DOCD: CPT | Performed by: INTERNAL MEDICINE

## 2020-10-13 PROCEDURE — G0439 PPPS, SUBSEQ VISIT: HCPCS | Performed by: INTERNAL MEDICINE

## 2020-10-13 PROCEDURE — G8536 NO DOC ELDER MAL SCRN: HCPCS | Performed by: INTERNAL MEDICINE

## 2020-10-13 NOTE — PROGRESS NOTES
INTERNISTS OF [facility]:  10/13/20, [id number]      The Subsequent Medicare Annual Wellness Exam PROGRESS NOTE    This is a Subsequent Medicare Annual Wellness Exam (AWV). I have reviewed the patient's medical history in detail and updated the computerized patient record. Vicky Singh is a 75 y.o.  female and presents for an annual wellness exam.    SUBJECTIVE    Past Medical History:   Diagnosis Date    Acid reflux     COPD, mild (HCC)     High cholesterol     Hypothyroidism     Joint pain     Joint swelling     Lichen planus     Sinus problem     Urinary frequency       Past Surgical History:   Procedure Laterality Date    HX KNEE REPLACEMENT Bilateral     left 2013, right 2016    HX PARTIAL HYSTERECTOMY  1967     Current Outpatient Medications   Medication Sig Dispense Refill    umeclidinium (INCRUSE ELLIPTA) 62.5 mcg/actuation inhaler INHALE 1 PUFF BY MOUTH ONCE DAILY 1 Inhaler 2    cholecalciferol, vitamin D3, (VITAMIN D3) 2,000 unit tab Take 2,000 Units by mouth daily.  levothyroxine (SYNTHROID) 112 mcg tablet Take 1 Tab by mouth Daily (before breakfast). 90 Tab 3    simvastatin (ZOCOR) 20 mg tablet Take 1 Tab by mouth nightly. 90 Tab 3    clobetasol (TEMOVATE) 0.05 % topical gel Apply  to affected area as needed (mouth sores). 15 g 3    aspirin delayed-release 81 mg tablet Take 81 mg by mouth daily.  multivitamin (ONE A DAY) tablet Take 1 Tab by mouth daily.        Allergies   Allergen Reactions    Penicillins Rash     Family History   Problem Relation Age of Onset    Heart Disease Mother     Hypertension Mother     No Known Problems Father     Breast Cancer Paternal Aunt     Diabetes Maternal Grandmother     Cancer Brother         pancreatic cancer     Social History     Tobacco Use    Smoking status: Former Smoker     Packs/day: 0.50     Years: 40.00     Pack years: 20.00     Types: Cigarettes     Last attempt to quit: 4/19/2009     Years since quitting: 11.4    Smokeless tobacco: Never Used   Substance Use Topics    Alcohol use: Not Currently     Patient Active Problem List   Diagnosis Code    Hypothyroidism E03.9    High cholesterol E78.00    COPD, mild (HCC) J44.9    Acid reflux Z61.7    Lichen planus X87.4    Severe obesity ([facility] [address] E66.01    Lumbar disc disease M51.9    Vitamin D deficiency E55.9       The pt is a 76yo female with h/o obesity, hypothyroidism, HLD, COPD?, GERD, lumbar disc disease, vitamin D deficiency, and lichen planus.     Health Maintenance History  Immunizations reviewed: Tdap over-due   Pneumovax: up to date   Flu: over-due  Zoster: over-due    Immunization History   Administered Date(s) Administered    Influenza Vaccine (Tri) Adjuvanted (>65 Yrs FLUAD TRI 63975) 10/25/2019       Colonoscopy: UTD. No bleeding. Eye exam: She gets one yearly. We do not have records. Mammo: Overdue    Dexascan: UTD. Pelvic/Pap: No vaginal bleeding. Review of Systems   Constitutional: Negative for chills and fever. HENT: Negative for ear pain and sore throat. Eyes: Negative for blurred vision and pain. Respiratory: Negative for cough and shortness of breath. Cardiovascular: Negative for chest pain. Gastrointestinal: Negative for abdominal pain, blood in stool and melena. Genitourinary: Negative for dysuria and hematuria. Musculoskeletal: Negative for joint pain and myalgias. Skin: Negative for rash. Neurological: Negative for tingling, focal weakness and headaches. Endo/Heme/Allergies: Does not bruise/bleed easily. Psychiatric/Behavioral: Negative for depression and substance abuse. Depression Risk Factor Screening:      Patient Health Questionnaire (PHQ-2)   Over the last 2 weeks, how often have you been bothered by any of the following problems? · Little interest or pleasure in doing things? · Not at all. [0]  · Feeling down, depressed, or hopeless?    · Not at all. [0]    Total Score: 0/6  PHQ-2 Assessment Scoring:   A score of 2 or more requires further screening with the PHQ-9    Alcohol Risk Factor Screening:   Women: On any occasion during the past 3 months, have you had more than 3 drinks containing alcohol? no    Do you average more than 7 drinks per week? no    Tobacco Use Screening:     Social History     Tobacco Use   Smoking Status Former Smoker    Packs/day: 0.50    Years: 40.00    Pack years: 20.00    Types: Cigarettes    Last attempt to quit: 2009    Years since quittin.4   Smokeless Tobacco Never Used       Hearing Loss    Hearing is good. Activities of Daily Living   Self-care. Requires assistance with: no ADLs  She does not need assistance with ADLs/IADLs    Fall Risk   no falls within the past year    Abuse Screen   None    Additional Examination Findings: There were no vitals filed for this visit. There is no height or weight on file to calculate BMI. Evaluation of Cognitive Function:  Mood/affect: Euthymic  Appearance: Well-groomed  Family member/caregiver input: She is not with a family member during her visit. General:   Well-nourished, well-groomed, pleasant, alert, in no acute distress. Head:  Normocephalic, atraumatic  Ears:  External ears WNL  Eyes:  Clear sclera  Neuro:   Alert, conversant, appropriate, following commands, no sensory deficit   Skin:    No rashes noted  Psych:  Affect, mood and judgment appropriate      Dementia Screen:   Three Item Recall: 3/3      LABS   Data Review:   Lab Results   Component Value Date/Time    Sodium 142 10/02/2020 01:48 PM    Potassium 4.4 10/02/2020 01:48 PM    Chloride 104 10/02/2020 01:48 PM    CO2 25 10/02/2020 01:48 PM    Glucose 94 10/02/2020 01:48 PM    BUN 12 10/02/2020 01:48 PM    Creatinine 0.75 10/02/2020 01:48 PM    BUN/Creatinine ratio 16 10/02/2020 01:48 PM    GFR est AA 90 10/02/2020 01:48 PM    GFR est non-AA 78 10/02/2020 01:48 PM    Calcium 9.2 10/02/2020 01:48 PM       Lab Results Component Value Date/Time    WBC 5.1 10/02/2020 01:48 PM    HGB 13.3 10/02/2020 01:48 PM    HCT 39.5 10/02/2020 01:48 PM    PLATELET 557 83/08/5933 01:48 PM    MCV 95 10/02/2020 01:48 PM       Lab Results   Component Value Date/Time    Hemoglobin A1c 5.3 04/23/2019 10:30 AM       Lab Results   Component Value Date/Time    Cholesterol, total 144 10/02/2020 01:48 PM    HDL Cholesterol 57 10/02/2020 01:48 PM    LDL, calculated 82 03/06/2020 09:58 AM    LDL Chol Calc (NIH) 73 10/02/2020 01:48 PM    VLDL, calculated 16 03/06/2020 09:58 AM    VLDL Cholesterol Ramiro 14 10/02/2020 01:48 PM    Triglyceride 67 10/02/2020 01:48 PM       Patient Care Team:  Janine Pope MD as PCP - General (Family Medicine)  Janine Pope MD as PCP - [facility] Empaneled Provider    End-of-life planning  Advanced Directive in the case than an injury or illness causes the patient to be unable to make health care decisions was discussed with the patient. Advice/Referrals/Counselling/Plan:   Education and counseling provided:  Are appropriate based on today's review and evaluation  End-of-Life planning (with patient's consent)  Pneumococcal Vaccine  Influenza Vaccine  Screening Mammography  Screening Pap and pelvic (covered once every 2 years)  Colorectal cancer screening tests  Cardiovascular screening blood test  Bone mass measurement (DEXA)  Screening for glaucoma  Diabetes screening test  Include in education list (weight loss, physical activity, smoking cessation, fall prevention, and nutrition)    ICD-10-CM ICD-9-CM    1. Encounter for screening mammogram for breast cancer  Z12.31 V76.12 ELIO MAMMO BI SCREENING INCL CAD   . Brief written plan, checklist    Assessment/Plan:    Health Maintenance:  - Mammogram ordered.   - I encouraged her to get her formal eye exam  - DEXA scan ordered        Lab review: labs are reviewed and ordered as mentioned above      Advance Care Planning  Advance Care Planning (ACP) Provider Kathy Membreno Date of ACP Conversation: 10/13/20  Persons included in Conversation:  patient    Authorized Decision Maker (if patient is incapable of making informed decisions): This person is:   Named in Advance Directive or Healthcare Power of           For Patients with Decision Making Capacity:   Values/Goals: Exploration of values, goals, and preferences if recovery is not expected, even with continued medical treatment in the event of:  Imminent death  Severe, permanent brain injury    Conversation Outcomes / Follow-Up Plan:   She has no changes to made to her pre-existing advance directive, which was reviewed with her today. Due to this being a TeleHealth phone only evaluation, many elements of the physical examination are unable to be assessed. The pt was seen by synchronous (real-time) audio-phone only technology, and/or her healthcare decision maker, is aware that this patient-initiated, Telehealth encounter is a billable service, with coverage as determined by her insurance carrier. She is aware that she may receive a bill and has provided verbal consent to proceed: Yes. The pt is being evaluated by a phone only visit encounter for concerns as above. A caregiver was present when appropriate. Due to this being a TeleHealth encounter (During [facility]- public health emergency), evaluation of the following organ systems was limited: Vitals/Constitutional/EENT/Resp/CV/GI//MS/Neuro/Skin/Heme-Lymph-Imm. Pursuant to the emergency declaration under the [facility], [address] [place] [address] authority and the [employer] and [employer] General Act, this Virtual phone only Visit was conducted, with patient's (and/or legal guardian's) consent, to reduce the patient's risk of exposure to COVID-19 and provide necessary medical care.      Services were provided through a phone only synchronous discussion virtually to substitute for in-person clinic visit. Patient and provider were located at their individual homes.

## 2020-10-13 NOTE — PROGRESS NOTES
Humza Winn is a 75 y.o. female who was seen by synchronous (real-time) audio-phone only technology on 10/13/2020. Assessment & Plan:   1. Hyperlipidemia: Stable. Continue with Rx as prescribed. We will check labs 1 week before her 9-month follow-up appointment. 2.  Hypothyroidism: Stable. Continue with Rx as prescribed. We will check labs 1 week before her 9-month follow-up appointment. Lab review: labs are reviewed in the EHR and ordered as mentioned above     I have discussed the diagnosis with the patient and the intended plan as seen in the above orders. I have discussed medication side effects and warnings with the patient as well. I have reviewed the plan of care with the patient, accepted their input and they are in agreement with the treatment goals. All questions were answered. The patient understands the plan of care. Pt instructed if symptoms worsen to call the office or report to the ED for continued care. Greater than 50% of the visit time was spent in counseling and/or coordination of care. I spent 10 minutes with the patient for this portion of her phone only encounter. Voice recognition was used to generate this report, which may have resulted in some phonetic based errors in grammar and contents. Even though attempts were made to correct all the mistakes, some may have been missed, and remained in the body of the document. Subjective:   Humza Winn was seen for   Chief Complaint   Patient presents with    Follow-up     The pt is a 74yo female with h/o obesity, hypothyroidism, HLD, COPD?, GERD, lumbar disc disease, vitamin D deficiency, and lichen planus.     1. HLD:  Her most recent labs show: Her total cholesterol is 144. Triglycerides are 67. HDL is 57. LDL is 73. On zocor. 2. Hypothyroidism: Her most recent TFTs are normal.  She is taking Synthroid 112 mcg daily. Prior to Admission medications    Medication Sig Start Date End Date Taking? Authorizing Provider   umeclidinium (INCRUSE ELLIPTA) 62.5 mcg/actuation inhaler INHALE 1 PUFF BY MOUTH ONCE DAILY 20   Snow Card MD   cholecalciferol, vitamin D3, (VITAMIN D3) 2,000 unit tab Take 2,000 Units by mouth daily. Provider, Historical   levothyroxine (SYNTHROID) 112 mcg tablet Take 1 Tab by mouth Daily (before breakfast). 19   Snow Card MD   simvastatin (ZOCOR) 20 mg tablet Take 1 Tab by mouth nightly. 19   Snow Card MD   clobetasol (TEMOVATE) 0.05 % topical gel Apply  to affected area as needed (mouth sores). 19   Snow Card MD   aspirin delayed-release 81 mg tablet Take 81 mg by mouth daily. Provider, Historical   multivitamin (ONE A DAY) tablet Take 1 Tab by mouth daily.     Provider, Historical     Allergies   Allergen Reactions    Penicillins Rash     Past Medical History:   Diagnosis Date    Acid reflux     COPD, mild (HCC)     High cholesterol     Hypothyroidism     Joint pain     Joint swelling     Lichen planus     Sinus problem     Urinary frequency      Past Surgical History:   Procedure Laterality Date    HX KNEE REPLACEMENT Bilateral     left , right 2016    HX PARTIAL HYSTERECTOMY  1967     Family History   Problem Relation Age of Onset    Heart Disease Mother     Hypertension Mother     No Known Problems Father     Breast Cancer Paternal Aunt     Diabetes Maternal Grandmother     Cancer Brother         pancreatic cancer     Social History     Socioeconomic History    Marital status:      Spouse name: Not on file    Number of children: Not on file    Years of education: Not on file    Highest education level: Not on file   Tobacco Use    Smoking status: Former Smoker     Packs/day: 0.50     Years: 40.00     Pack years: 20.00     Types: Cigarettes     Last attempt to quit: 2009     Years since quittin.4    Smokeless tobacco: Never Used   Substance and Sexual Activity    Alcohol use: Not Currently    Drug use: Not Currently    Sexual activity: Yes     Partners: Male       ROS:  Gen: No fever/chills  HEENT: No sore throat, eye pain, ear pain, or congestion. No HA  CV: No CP  Resp: No cough/SOB  GI: No abdominal pain  : No hematuria/dysuria  Derm: No rash  Neuro: No new paresthesias/weakness  Musc: No new myalgias/jt pain  Psych: No depression sx      LABS:  Lab Results   Component Value Date/Time    Sodium 142 10/02/2020 01:48 PM    Potassium 4.4 10/02/2020 01:48 PM    Chloride 104 10/02/2020 01:48 PM    CO2 25 10/02/2020 01:48 PM    Glucose 94 10/02/2020 01:48 PM    BUN 12 10/02/2020 01:48 PM    Creatinine 0.75 10/02/2020 01:48 PM    BUN/Creatinine ratio 16 10/02/2020 01:48 PM    GFR est AA 90 10/02/2020 01:48 PM    GFR est non-AA 78 10/02/2020 01:48 PM    Calcium 9.2 10/02/2020 01:48 PM       Lab Results   Component Value Date/Time    Cholesterol, total 144 10/02/2020 01:48 PM    HDL Cholesterol 57 10/02/2020 01:48 PM    LDL, calculated 82 03/06/2020 09:58 AM    LDL Chol Calc (NIH) 73 10/02/2020 01:48 PM    VLDL, calculated 16 03/06/2020 09:58 AM    VLDL Cholesterol Ramiro 14 10/02/2020 01:48 PM    Triglyceride 67 10/02/2020 01:48 PM       Lab Results   Component Value Date/Time    WBC 5.1 10/02/2020 01:48 PM    HGB 13.3 10/02/2020 01:48 PM    HCT 39.5 10/02/2020 01:48 PM    PLATELET 318 31/99/9829 01:48 PM    MCV 95 10/02/2020 01:48 PM       Lab Results   Component Value Date/Time    Hemoglobin A1c 5.3 04/23/2019 10:30 AM       Lab Results   Component Value Date/Time    TSH 1.420 10/02/2020 01:48 PM           Due to this being a TeleHealth phone only evaluation, many elements of the physical examination are unable to be assessed. The pt was seen by synchronous (real-time) audio-phone only technology, and/or her healthcare decision maker, is aware that this patient-initiated, Telehealth encounter is a billable service, with coverage as determined by her insurance carrier.  She is aware that she may receive a bill and has provided verbal consent to proceed: Yes. The pt is being evaluated by a phone only visit encounter for concerns as above. A caregiver was present when appropriate. Due to this being a TeleHealth encounter (During UBWF-73 public health emergency), evaluation of the following organ systems was limited: Vitals/Constitutional/EENT/Resp/CV/GI//MS/Neuro/Skin/Heme-Lymph-Imm. Pursuant to the emergency declaration under the [address] [place] [address] and the [employer] Act, this Virtual phone only Visit was conducted, with patient's (and/or legal guardian's) consent, to reduce the patient's risk of exposure to COVID-19 and provide necessary medical care. Services were provided through a phone only synchronous discussion virtually to substitute for in-person clinic visit. Patient and provider were located at their individual homes. We discussed the expected course, resolution and complications of the diagnosis(es) in detail. Medication risks, benefits, costs, interactions, and alternatives were discussed as indicated. I advised her to contact the office if her condition worsens, changes or fails to improve as anticipated. She expressed understanding with the diagnosis(es) and plan.      Troy Chino MD

## 2020-10-13 NOTE — ACP (ADVANCE CARE PLANNING)
Advance Care Planning  Advance Care Planning (ACP) Provider Conversation     Date of ACP Conversation: 10/13/20  Persons included in Conversation:  patient    Authorized Decision Maker (if patient is incapable of making informed decisions): This person is:   Named in Advance Directive or Healthcare Power of           For Patients with Decision Making Capacity:   Values/Goals: Exploration of values, goals, and preferences if recovery is not expected, even with continued medical treatment in the event of:  Imminent death  Severe, permanent brain injury    Conversation Outcomes / Follow-Up Plan:   She has no changes to made to her pre-existing advance directive, which was reviewed with her today.     Dr. Ari Lei  [facility], [address] [place] [address], [address] [place] [address].  Phone: [phone]  Fax: [phone]

## 2020-10-13 NOTE — PATIENT INSTRUCTIONS
Medicare Wellness Visit, Female The best way to live healthy is to have a lifestyle where you eat a well-balanced diet, exercise regularly, limit alcohol use, and quit all forms of tobacco/nicotine, if applicable. Regular preventive services are another way to keep healthy. Preventive services (vaccines, screening tests, monitoring & exams) can help personalize your care plan, which helps you manage your own care. Screening tests can find health problems at the earliest stages, when they are easiest to treat. [facility] follows the current, evidence-based guidelines published by the [facility] ([facility]) when recommending preventive services for our patients. Because we follow these guidelines, sometimes recommendations change over time as research supports it. (For example, mammograms used to be recommended annually. Even though Medicare will still pay for an annual mammogram, the newer guidelines recommend a mammogram every two years for women of average risk). Of course, you and your doctor may decide to screen more often for some diseases, based on your risk and your co-morbidities (chronic disease you are already diagnosed with). Preventive services for you include: - Medicare offers their members a free annual wellness visit, which is time for you and your primary care provider to discuss and plan for your preventive service needs. Take advantage of this benefit every year! 
-All adults over the age of 72 should receive the recommended pneumonia vaccines. Current USPSTF guidelines recommend a series of two vaccines for the best pneumonia protection.  
-All adults should have a flu vaccine yearly and a tetanus vaccine every 10 years.  
-All adults age 48 and older should receive the shingles vaccines (series of two vaccines). -All adults age 38-68 who are overweight should have a diabetes screening test once every three years. -All adults born between [birth year] and 1965 should be screened once for Hepatitis C. 
-Other screening tests and preventive services for persons with diabetes include: an eye exam to screen for diabetic retinopathy, a kidney function test, a foot exam, and stricter control over your cholesterol.  
-Cardiovascular screening for adults with routine risk involves an electrocardiogram (ECG) at intervals determined by your doctor.  
-Colorectal cancer screenings should be done for adults age 54-65 with no increased risk factors for colorectal cancer. There are a number of acceptable methods of screening for this type of cancer. Each test has its own benefits and drawbacks. Discuss with your doctor what is most appropriate for you during your annual wellness visit. The different tests include: colonoscopy (considered the best screening method), a fecal occult blood test, a fecal DNA test, and sigmoidoscopy. 
 
-A bone mass density test is recommended when a woman turns 65 to screen for osteoporosis. This test is only recommended one time, as a screening. Some providers will use this same test as a disease monitoring tool if you already have osteoporosis. -Breast cancer screenings are recommended every other year for women of normal risk, age 54-69. 
-Cervical cancer screenings for women over age 72 are only recommended with certain risk factors. Here is a list of your current Health Maintenance items (your personalized list of preventive services) with a due date: 
Health Maintenance Due Topic Date Due  
 DTaP/Tdap/Td  (1 - Tdap) 11/14/1965  Shingles Vaccine (1 of 2) 11/14/1994  Glaucoma Screening   11/14/2009  Yearly Flu Vaccine (1) 09/01/2020 [address] [place] [address] Annual Well Visit  09/12/2020 Medicare Part B Preventive Services Limitations Recommendation Scheduled Bone Mass Measurement 
(age 72 & older, biennial) Requires diagnosis related to osteoporosis or estrogen deficiency. Biennial benefit unless patient has history of long-term glucocorticoid tx or baseline is needed because initial test was by other method This should be done at age 72 and again if on osteoporosis medication at 2-3 year intervals. Bone density study ordered (due after December) Cardiovascular Screening Blood Tests (every 5 years) Total cholesterol, HDL, Triglycerides Order as a panel if possible We should check your cholesterol panel at least once every 5 years. Up to date Colorectal Cancer Screening 
-Fecal occult blood test (annual) -Flexible sigmoidoscopy (5y) 
-Screening colonoscopy (10y) -Barium Enema  Due per your Gastroenterologist's recommendations. Up to date Counseling to Prevent Tobacco Use (up to 8 sessions per year) - Counseling greater than 3 and up to 10 minutes - Counseling greater than 10 minutes Patients must be asymptomatic of tobacco-related conditions to receive as preventive service  Not applicable Diabetes Screening Tests (at least every 3 years, Medicare covers annually or at 6-month intervals for prediabetic patients) Fasting blood sugar (FBS) or glucose tolerance test (GTT) Patient must be diagnosed with one of the following: 
-Hypertension, Dyslipidemia, obesity, previous impaired FBS or GTT 
Or any two of the following: overweight, FH of diabetes, age ? 72, history of gestational diabetes, birth of baby weighing more than 9 pounds Should be done yearly Up to date Diabetes Self-Management Training (DSMT) (no USPSTF recommendation) Requires referral by treating physician for patient with diabetes or renal disease. 10 hours of initial DSMT session of no less than 30 minutes each in a continuous 12-month period. 2 hours of follow-up DSMT in subsequent years. Not applicable Not applicable Glaucoma Screening (no USPSTF recommendation) Diabetes mellitus, family history, , age 48 or over,  American, age 72 or over Continue with annual eye exams. Overdue Human Immunodeficiency Virus (HIV) Screening (annually for increased risk patients) HIV-1 and HIV-2 by EIA, RENETTA, rapid antibody test, or oral mucosa transudate Patient must be at increased risk for HIV infection per USPSTF guidelines or pregnant. Tests covered annually for patients at increased risk. Pregnant patients may receive up to 3 test during pregnancy. Not applicable Not applicable Medical Nutrition Therapy (MNT) (for diabetes or renal disease not recommended schedule) Requires referral by treating physician for patient with diabetes or renal disease. Can be provided in same year as diabetes self-management training (DSMT), and CMS recommends medical nutrition therapy take place after DSMT. Up to 3 hours for initial year and 2 hours in subsequent years. Not applicable Not applicable Shingles Vaccination   Overdue Seasonal Influenza Vaccination (annually)  Continue with yearly \"flu\" shot annually Overdue Pneumococcal Vaccination (once after 65)  Please receive this vaccination at age 72. Overdue Hepatitis B Vaccinations (if medium/high risk) Medium/high risk factors:  End-stage renal disease, Hemophiliacs who received Factor VIII or IX concentrates, Clients of institutions for the mentally retarded, Persons who live in the same house as a HepB virus carrier, Homosexual men, Illicit injectable drug abusers. Not applicable Not applicable Screening Mammography (biennial age 54-69) Annually (age 36 or over) You need a mammogram yearly to screen for breast cancer. Ordered Screening Pap Tests and Pelvic Examination (up to age 79 and after 79 if unknown history or abnormal study last 10 years) Every 24 months except high risk You need no Pap smear at this time. Not warranted at this time.   
Ultrasound Screening for Abdominal Aortic Aneurysm (AAA) (once) Patient must be referred through IPPE and not have had a screening for abdominal aortic aneurysm before under Medicare. Limited to patients who meet one of the following criteria: 
- Men who are 73-68 years old and have smoked more than 100 cigarettes in their lifetime. 
-Anyone with a FH of AAA 
-Anyone recommended for screening by USPSTF Not applicable Not applicable Well Visit, Over 72: Care Instructions Your Care Instructions Physical exams can help you stay healthy. Your doctor has checked your overall health and may have suggested ways to take good care of yourself. He or she also may have recommended tests. At home, you can help prevent illness with healthy eating, regular exercise, and other steps. Follow-up care is a key part of your treatment and safety. Be sure to make and go to all appointments, and call your doctor if you are having problems. It's also a good idea to know your test results and keep a list of the medicines you take. How can you care for yourself at home? · Reach and stay at a healthy weight. This will lower your risk for many problems, such as obesity, diabetes, heart disease, and high blood pressure. · Get at least 30 minutes of exercise on most days of the week. Walking is a good choice. You also may want to do other activities, such as running, swimming, cycling, or playing tennis or team sports. · Do not smoke. Smoking can make health problems worse. If you need help quitting, talk to your doctor about stop-smoking programs and medicines. These can increase your chances of quitting for good. · Protect your skin from too much sun. When you're outdoors from 10 a.m. to 4 p.m., stay in the shade or cover up with clothing and a hat with a wide brim. Wear sunglasses that block UV rays. Even when it's cloudy, put broad-spectrum sunscreen (SPF 30 or higher) on any exposed skin. · See a dentist one or two times a year for checkups and to have your teeth cleaned. · Wear a seat belt in the car. Follow your doctor's advice about when to have certain tests. These tests can spot problems early. For men and women · Cholesterol. Your doctor will tell you how often to have this done based on your overall health and other things that can increase your risk for heart attack and stroke. · Blood pressure. Have your blood pressure checked during a routine doctor visit. Your doctor will tell you how often to check your blood pressure based on your age, your blood pressure results, and other factors. · Diabetes. Ask your doctor whether you should have tests for diabetes. · Vision. Experts recommend that you have yearly exams for glaucoma and other age-related eye problems. · Hearing. Tell your doctor if you notice any change in your hearing. You can have tests to find out how well you hear. · Colon cancer tests. Keep having colon cancer tests as your doctor recommends. You can have one of several types of tests. · Heart attack and stroke risk. At least every 4 to 6 years, you should have your risk for heart attack and stroke assessed. Your doctor uses factors such as your age, blood pressure, cholesterol, and whether you smoke or have diabetes to show what your risk for a heart attack or stroke is over the next 10 years. · Osteoporosis. Talk to your doctor about whether you should have a bone density test to find out whether you have thinning bones. Ask your doctor if you need to take a calcium plus vitamin D supplement. You may be able to get enough calcium and vitamin D through your diet. For women · Pap test and pelvic exam. You may no longer need a Pap test. Talk with your doctor about whether to stop or continue to have Pap tests. · Breast exam and mammogram. Ask how often you should have a mammogram, which is an X-ray of your breasts. A mammogram can spot breast cancer before it can be felt and when it is easiest to treat. · Thyroid disease.  Talk to your doctor about whether to have your thyroid checked as part of a regular physical exam. Women have an increased chance of a thyroid problem. For men · Prostate exam. Talk to your doctor about whether you should have a blood test (called a PSA test) for prostate cancer. Experts recommend that you discuss the benefits and risks of the test with your doctor before you decide whether to have this test. Some experts say that men ages 79 and older no longer need testing. · Abdominal aortic aneurysm. Ask your doctor whether you should have a test to check for an aneurysm. You may need a test if you ever smoked or if your parent, brother, sister, or child has had an aneurysm. When should you call for help? Watch closely for changes in your health, and be sure to contact your doctor if you have any problems or symptoms that concern you. Where can you learn more? Go to http://www.gray.com/ Enter N808 in the search box to learn more about \"Well Visit, Over 65: Care Instructions. \" Current as of: May 27, 2020               Content Version: 12.6 © 2006-2020 [employer]. Care instructions adapted under license by [employer] (which disclaims liability or warranty for this information). If you have questions about a medical condition or this instruction, always ask your healthcare professional. [address] any warranty or liability for your use of this information.

## 2020-10-14 ENCOUNTER — OFFICE VISIT (OUTPATIENT)
Dept: INTERNAL MEDICINE CLINIC | Age: 75
End: 2020-10-14
Payer: MEDICARE

## 2020-10-14 DIAGNOSIS — Z23 NEEDS FLU SHOT: Primary | ICD-10-CM

## 2020-10-14 PROCEDURE — G0008 ADMIN INFLUENZA VIRUS VAC: HCPCS | Performed by: INTERNAL MEDICINE

## 2020-10-14 PROCEDURE — 90694 VACC AIIV4 NO PRSRV 0.5ML IM: CPT | Performed by: INTERNAL MEDICINE

## 2020-11-16 ENCOUNTER — TELEPHONE (OUTPATIENT)
Dept: INTERNAL MEDICINE CLINIC | Age: 75
End: 2020-11-16

## 2020-11-16 DIAGNOSIS — K52.9 COLITIS: Primary | ICD-10-CM

## 2020-11-16 NOTE — TELEPHONE ENCOUNTER
Patient reached, she states that she needs a referral placed to a gastro doctor. Patient will call back to after she reaches DR. Mariella Dugan's office.

## 2020-11-16 NOTE — TELEPHONE ENCOUNTER
Pt needs a ED follow up - she was seen at [facility] 11/09 for vomiting and diarrhea

## 2020-11-16 NOTE — TELEPHONE ENCOUNTER
Pt said Dr Tony Chatman [id number] excepts her insurance, she would like to be seen at [facility] location

## 2021-04-15 DIAGNOSIS — Z78.0 POSTMENOPAUSAL: ICD-10-CM

## 2021-05-13 ENCOUNTER — DOCUMENTATION ONLY (OUTPATIENT)
Dept: INTERNAL MEDICINE CLINIC | Age: 76
End: 2021-05-13

## 2021-05-13 NOTE — PROGRESS NOTES
IGOR SUÁREZ Case ID: [id number] help? Call us at [phone]  Outcome  Approvedtoday  [place] Case: [id number], Status: Approved, Coverage Starts on: 1/1/2021 12:00:00 AM, Coverage Ends on: 12/31/2021 12:00:00 AM. Questions? Contact [phone]. Drug  Incruse Ellipta 62. 5MCG/INH aerosol powder  Form  [employer] Form  Faxed to pharmacy.

## 2021-05-18 ENCOUNTER — HOSPITAL ENCOUNTER (OUTPATIENT)
Dept: MAMMOGRAPHY | Age: 76
Discharge: HOME | End: 2021-05-18
Attending: INTERNAL MEDICINE
Payer: MEDICARE

## 2021-05-18 ENCOUNTER — HOSPITAL ENCOUNTER (OUTPATIENT)
Dept: BONE DENSITY | Age: 76
Discharge: HOME | End: 2021-05-18
Attending: INTERNAL MEDICINE
Payer: MEDICARE

## 2021-05-18 DIAGNOSIS — Z12.31 ENCOUNTER FOR SCREENING MAMMOGRAM FOR BREAST CANCER: ICD-10-CM

## 2021-05-18 PROCEDURE — 77063 BREAST TOMOSYNTHESIS BI: CPT

## 2021-05-18 PROCEDURE — 77080 DXA BONE DENSITY AXIAL: CPT

## 2021-05-19 PROBLEM — M81.0 OSTEOPOROSIS: Status: ACTIVE | Noted: 2021-05-19

## 2021-05-19 NOTE — PROGRESS NOTES
Please let her know that her DEXA scan shows osteoporosis. If she is interested in treatment, please place an order for a referral to Rheumatology or Endocrinology - for treatment - for me to sign.      Dr. James Hirsch  [facility], [address] [place] [address], [address] [place] [place] [address] [address]  Phone: [phone]  Fax: [phone]

## 2021-05-28 ENCOUNTER — APPOINTMENT (OUTPATIENT)
Dept: INTERNAL MEDICINE CLINIC | Age: 76
End: 2021-05-28

## 2021-05-28 DIAGNOSIS — E03.9 HYPOTHYROIDISM, UNSPECIFIED TYPE: ICD-10-CM

## 2021-05-28 DIAGNOSIS — E78.00 HIGH CHOLESTEROL: ICD-10-CM

## 2021-05-29 LAB
ALBUMIN SERPL-MCNC: 4 G/DL (ref 3.7–4.7)
ALBUMIN/GLOB SERPL: 1.4 {RATIO} (ref 1.2–2.2)
ALP SERPL-CCNC: 99 IU/L (ref 48–121)
ALT SERPL-CCNC: 12 IU/L (ref 0–32)
AST SERPL-CCNC: 19 IU/L (ref 0–40)
BASOPHILS # BLD AUTO: 0 X10E3/UL (ref 0–0.2)
BASOPHILS NFR BLD AUTO: 1 %
BILIRUB SERPL-MCNC: 0.5 MG/DL (ref 0–1.2)
BUN SERPL-MCNC: 14 MG/DL (ref 8–27)
BUN/CREAT SERPL: 21 (ref 12–28)
CALCIUM SERPL-MCNC: 9.4 MG/DL (ref 8.7–10.3)
CHLORIDE SERPL-SCNC: 104 MMOL/L (ref 96–106)
CHOLEST SERPL-MCNC: 154 MG/DL (ref 100–199)
CO2 SERPL-SCNC: 25 MMOL/L (ref 20–29)
CREAT SERPL-MCNC: 0.68 MG/DL (ref 0.57–1)
EOSINOPHIL # BLD AUTO: 0.1 X10E3/UL (ref 0–0.4)
EOSINOPHIL NFR BLD AUTO: 2 %
ERYTHROCYTE [DISTWIDTH] IN BLOOD BY AUTOMATED COUNT: 12.9 % (ref 11.7–15.4)
GLOBULIN SER CALC-MCNC: 2.8 G/DL (ref 1.5–4.5)
GLUCOSE SERPL-MCNC: 89 MG/DL (ref 65–99)
HCT VFR BLD AUTO: 39.8 % (ref 34–46.6)
HDLC SERPL-MCNC: 64 MG/DL
HGB BLD-MCNC: 13.1 G/DL (ref 11.1–15.9)
IMM GRANULOCYTES # BLD AUTO: 0 X10E3/UL (ref 0–0.1)
IMM GRANULOCYTES NFR BLD AUTO: 0 %
IMP & REVIEW OF LAB RESULTS: NORMAL
LDLC SERPL CALC-MCNC: 77 MG/DL (ref 0–99)
LYMPHOCYTES # BLD AUTO: 2.1 X10E3/UL (ref 0.7–3.1)
LYMPHOCYTES NFR BLD AUTO: 36 %
MCH RBC QN AUTO: 31.6 PG (ref 26.6–33)
MCHC RBC AUTO-ENTMCNC: 32.9 G/DL (ref 31.5–35.7)
MCV RBC AUTO: 96 FL (ref 79–97)
MONOCYTES # BLD AUTO: 0.4 X10E3/UL (ref 0.1–0.9)
MONOCYTES NFR BLD AUTO: 8 %
NEUTROPHILS # BLD AUTO: 3.1 X10E3/UL (ref 1.4–7)
NEUTROPHILS NFR BLD AUTO: 53 %
PLATELET # BLD AUTO: 177 X10E3/UL (ref 150–450)
POTASSIUM SERPL-SCNC: 4.7 MMOL/L (ref 3.5–5.2)
PROT SERPL-MCNC: 6.8 G/DL (ref 6–8.5)
RBC # BLD AUTO: 4.15 X10E6/UL (ref 3.77–5.28)
SODIUM SERPL-SCNC: 143 MMOL/L (ref 134–144)
T4 FREE SERPL-MCNC: 1.45 NG/DL (ref 0.82–1.77)
TRIGL SERPL-MCNC: 67 MG/DL (ref 0–149)
TSH SERPL DL<=0.005 MIU/L-ACNC: 0.75 UIU/ML (ref 0.45–4.5)
VLDLC SERPL CALC-MCNC: 13 MG/DL (ref 5–40)
WBC # BLD AUTO: 5.7 X10E3/UL (ref 3.4–10.8)

## 2021-06-07 ENCOUNTER — TELEPHONE (OUTPATIENT)
Dept: INTERNAL MEDICINE CLINIC | Age: 76
End: 2021-06-07

## 2021-06-07 ENCOUNTER — OFFICE VISIT (OUTPATIENT)
Dept: INTERNAL MEDICINE CLINIC | Age: 76
End: 2021-06-07
Payer: MEDICARE

## 2021-06-07 VITALS
RESPIRATION RATE: 16 BRPM | HEIGHT: 66 IN | TEMPERATURE: 97.7 F | DIASTOLIC BLOOD PRESSURE: 52 MMHG | WEIGHT: 222 LBS | SYSTOLIC BLOOD PRESSURE: 118 MMHG | BODY MASS INDEX: 35.68 KG/M2 | HEART RATE: 89 BPM | OXYGEN SATURATION: 97 %

## 2021-06-07 DIAGNOSIS — E78.00 HIGH CHOLESTEROL: ICD-10-CM

## 2021-06-07 DIAGNOSIS — E03.9 HYPOTHYROIDISM, UNSPECIFIED TYPE: Primary | ICD-10-CM

## 2021-06-07 DIAGNOSIS — J44.9 COPD, MILD (HCC): ICD-10-CM

## 2021-06-07 DIAGNOSIS — Z00.00 MEDICARE ANNUAL WELLNESS VISIT, SUBSEQUENT: ICD-10-CM

## 2021-06-07 DIAGNOSIS — E66.01 SEVERE OBESITY (HCC): ICD-10-CM

## 2021-06-07 DIAGNOSIS — Z71.89 ADVANCE CARE PLANNING: ICD-10-CM

## 2021-06-07 DIAGNOSIS — M81.0 OSTEOPOROSIS, UNSPECIFIED OSTEOPOROSIS TYPE, UNSPECIFIED PATHOLOGICAL FRACTURE PRESENCE: ICD-10-CM

## 2021-06-07 PROCEDURE — G8417 CALC BMI ABV UP PARAM F/U: HCPCS | Performed by: INTERNAL MEDICINE

## 2021-06-07 PROCEDURE — 1090F PRES/ABSN URINE INCON ASSESS: CPT | Performed by: INTERNAL MEDICINE

## 2021-06-07 PROCEDURE — G8427 DOCREV CUR MEDS BY ELIG CLIN: HCPCS | Performed by: INTERNAL MEDICINE

## 2021-06-07 PROCEDURE — G0439 PPPS, SUBSEQ VISIT: HCPCS | Performed by: INTERNAL MEDICINE

## 2021-06-07 PROCEDURE — G8510 SCR DEP NEG, NO PLAN REQD: HCPCS | Performed by: INTERNAL MEDICINE

## 2021-06-07 PROCEDURE — G8536 NO DOC ELDER MAL SCRN: HCPCS | Performed by: INTERNAL MEDICINE

## 2021-06-07 PROCEDURE — 1101F PT FALLS ASSESS-DOCD LE1/YR: CPT | Performed by: INTERNAL MEDICINE

## 2021-06-07 PROCEDURE — 99214 OFFICE O/P EST MOD 30 MIN: CPT | Performed by: INTERNAL MEDICINE

## 2021-06-07 RX ORDER — ZOSTER VACCINE RECOMBINANT, ADJUVANTED 50 MCG/0.5
0.5 KIT INTRAMUSCULAR
Qty: 1 ML | Refills: 0 | Status: SHIPPED | OUTPATIENT
Start: 2021-06-07 | End: 2021-08-08

## 2021-06-07 NOTE — PROGRESS NOTES
Please let her know that her mammogram results are unremarkable. She needs a follow-up study in a year.     Dr. Krissy Haley  [facility], [facility] [facility], [address] [place] [address] Str.  Phone: [phone]  Fax: [phone]

## 2021-06-07 NOTE — PROGRESS NOTES
INTERNISTS OF [facility]:  06/07/21, [id number]      The Subsequent Medicare Annual Wellness Exam PROGRESS NOTE    This is a Subsequent Medicare Annual Wellness Exam (AWV). I have reviewed the patient's medical history in detail and updated the computerized patient record. Stepan Beckett is a 67 y.o.  female and presents for an annual wellness exam.    SUBJECTIVE    Past Medical History:   Diagnosis Date    Acid reflux     COPD, mild (HCC)     High cholesterol     Hypothyroidism     Joint pain     Joint swelling     Lichen planus     Sinus problem     Urinary frequency       Past Surgical History:   Procedure Laterality Date    HX KNEE REPLACEMENT Bilateral     left 2013, right 2016    HX PARTIAL HYSTERECTOMY  1967     Current Outpatient Medications   Medication Sig Dispense Refill    simvastatin (ZOCOR) 20 mg tablet Take 1 tablet by mouth nightly 90 Tab 2    Euthyrox 112 mcg tablet TAKE 1 TABLET BY MOUTH ONCE DAILY BEFORE BREAKFAST 90 Tab 2    cholecalciferol, vitamin D3, (VITAMIN D3) 2,000 unit tab Take 2,000 Units by mouth daily.  clobetasol (TEMOVATE) 0.05 % topical gel Apply  to affected area as needed (mouth sores). 15 g 3    aspirin delayed-release 81 mg tablet Take 81 mg by mouth daily.  multivitamin (ONE A DAY) tablet Take 1 Tab by mouth daily.       Incruse Ellipta 62.5 mcg/actuation inhaler Inhale 1 puff by mouth once daily (Patient not taking: Reported on 6/7/2021) 1 Inhaler 5     Allergies   Allergen Reactions    Penicillins Rash     Family History   Problem Relation Age of Onset    Heart Disease Mother     Hypertension Mother     No Known Problems Father     Breast Cancer Paternal Aunt     Diabetes Maternal Grandmother     Cancer Brother         pancreatic cancer     Social History     Tobacco Use    Smoking status: Former Smoker     Packs/day: 0.50     Years: 40.00     Pack years: 20.00     Types: Cigarettes     Quit date: 4/19/2009     Years since quittin.1    Smokeless tobacco: Never Used   Substance Use Topics    Alcohol use: Not Currently     Patient Active Problem List   Diagnosis Code    Hypothyroidism E03.9    High cholesterol E78.00    COPD, mild (HCC) J44.9    Acid reflux P07.3    Lichen planus V84.3    Severe obesity ([facility] [address] E66.01    Lumbar disc disease M51.9    Vitamin D deficiency E55.9    Osteoporosis M81.0     The pt is a 75yo female with h/o obesity, hypothyroidism, HLD, COPD?, GERD, lumbar disc disease, vitamin D deficiency, and lichen planus.     Health Maintenance History  Immunizations reviewed: Tdap over-due   Pneumovax: over-due   Flu: up to date  Zoster: over-due    Immunization History   Administered Date(s) Administered    COVID-19, PFIZER, MRNA, LNP-S, PF, 30MCG/0.3ML DOSE 2021, 2021    Influenza Vaccine (Tri) Adjuvanted (>65 Yrs FLUAD TRI [id number]) 10/25/2019    Influenza, Quadrivalent, Adjuvanted (>65 Yrs FLUAD QUAD [id number]) 10/14/2020       Colonoscopy: Up to date. No bleeding. Eye exam: Up to date    Mammo: Up to date    Dexascan: Discussed today. UTD    Pelvic/Pap: No bleeding. Review of Systems   Constitutional: Negative for chills and fever. HENT: Negative for ear pain and sore throat. Eyes: Negative for blurred vision and pain. Respiratory: Negative for cough and shortness of breath. Cardiovascular: Negative for chest pain. Gastrointestinal: Negative for abdominal pain, blood in stool and melena. Genitourinary: Negative for dysuria and hematuria. Musculoskeletal: Positive for neck pain (it does not radiate and is off/on). Negative for joint pain and myalgias. Skin: Negative for rash. Neurological: Negative for tingling, focal weakness and headaches. Endo/Heme/Allergies: Does not bruise/bleed easily. Psychiatric/Behavioral: Negative for substance abuse.        Depression Risk Factor Screening:      Patient Health Questionnaire (PHQ-2)   Over the last 2 weeks, how often have you been bothered by any of the following problems? · Little interest or pleasure in doing things? · Not at all. [0]  · Feeling down, depressed, or hopeless? · Not at all. [0]    Total Score: 0/6  PHQ-2 Assessment Scoring:   A score of 2 or more requires further screening with the PHQ-9    Alcohol Risk Factor Screening:   Women: On any occasion during the past 3 months, have you had more than 3 drinks containing alcohol? no    Do you average more than 7 drinks per week? no    Tobacco Use Screening:     Social History     Tobacco Use   Smoking Status Former Smoker    Packs/day: 0.50    Years: 40.00    Pack years: 20.00    Types: Cigarettes    Quit date: 2009    Years since quittin.1   Smokeless Tobacco Never Used       Hearing Loss    Hearing is good. Activities of Daily Living   Self-care. Requires assistance with: no ADLs  She does not need help with ADLs/IADLs    Fall Risk   no falls within the past year    Abuse Screen   None    Additional Examination Findings:  Vitals:    21 0924   Resp: 16   Temp: 97.7 °F (36.5 °C)   TempSrc: Temporal   Weight: 222 lb (100.7 kg)   Height: 5' 6.25\" (1.683 m)   PainSc:   0 - No pain      Body mass index is 35.56 kg/m². Evaluation of Cognitive Function:  Mood/affect: Euthymic  Appearance: Well-groomed  Family member/caregiver input: She is not with a family member today. General:   Well-nourished, well-groomed, pleasant, alert, in no acute distress.      Head:  Normocephalic, atraumatic  Ears:  External ears WNL  Eyes:  Clear sclera  Neuro:   Alert, conversant, appropriate, following commands  Skin:    No rashes noted  Psych: Affect, mood and judgment appropriate      Dementia Screen:   Clock Drawing (ten past eleven) Exercise: Unremarkable      LABS   Data Review:   Lab Results   Component Value Date/Time    Sodium 143 2021 09:30 AM    Potassium 4.7 2021 09:30 AM    Chloride 104 2021 09:30 AM    CO2 25 2021 09:30 AM    Glucose 89 05/28/2021 09:30 AM    BUN 14 05/28/2021 09:30 AM    Creatinine 0.68 05/28/2021 09:30 AM    BUN/Creatinine ratio 21 05/28/2021 09:30 AM    GFR est AA 98 05/28/2021 09:30 AM    GFR est non-AA 85 05/28/2021 09:30 AM    Calcium 9.4 05/28/2021 09:30 AM       Lab Results   Component Value Date/Time    WBC 5.7 05/28/2021 09:30 AM    HGB 13.1 05/28/2021 09:30 AM    HCT 39.8 05/28/2021 09:30 AM    PLATELET 152 65/46/3040 09:30 AM    MCV 96 05/28/2021 09:30 AM       Lab Results   Component Value Date/Time    Hemoglobin A1c 5.3 04/23/2019 10:30 AM       Lab Results   Component Value Date/Time    Cholesterol, total 154 05/28/2021 09:30 AM    HDL Cholesterol 64 05/28/2021 09:30 AM    LDL, calculated 77 05/28/2021 09:30 AM    LDL, calculated 82 03/06/2020 09:58 AM    VLDL, calculated 13 05/28/2021 09:30 AM    VLDL, calculated 16 03/06/2020 09:58 AM    Triglyceride 67 05/28/2021 09:30 AM       Patient Care Team:  Maciej Nolen MD as PCP - General (Family Medicine)  Maciej Nolen MD as PCP - [facility] Provider    End-of-life planning  Advanced Directive in the case than an injury or illness causes the patient to be unable to make health care decisions was discussed with the patient. Advice/Referrals/Counselling/Plan:   Education and counseling provided:  Are appropriate based on today's review and evaluation  End-of-Life planning (with patient's consent)  Pneumococcal Vaccine  Influenza Vaccine  Screening Mammography  Screening Pap and pelvic (covered once every 2 years)  Colorectal cancer screening tests  Cardiovascular screening blood test  Bone mass measurement (DEXA)  Screening for glaucoma  Diabetes screening test  Include in education list (weight loss, physical activity, smoking cessation, fall prevention, and nutrition)    ICD-10-CM ICD-9-CM    1. Hypothyroidism, unspecified type  E03.9 244.9    2. COPD, mild ([address]  J44.9 496    3.  Osteoporosis, unspecified osteoporosis type, unspecified pathological fracture presence  M81.0 733.00    4. High cholesterol  E78.00 272.0    5. Severe obesity ([facility] [address]  E66.01 278.01      reviewed diet, exercise and weight control. Brief written plan, checklist    Assessment/Plan:    Health Maintenance:  I encouraged her to get appointment activation/screenings. Lab review: labs are reviewed in the [address] [place] [address] (ACP) Provider Conversation     Date of ACP Conversation: 06/07/21  Persons included in Conversation:  patient    Authorized Decision Maker (if patient is incapable of making informed decisions): This person is:   Named in Advance Directive or Healthcare Power of           For Patients with Decision Making Capacity:   Values/Goals: Exploration of values, goals, and preferences if recovery is not expected, even with continued medical treatment in the event of:  Imminent death  Severe, permanent brain injury    Conversation Outcomes / Follow-Up Plan:   ACP complete - no further action today. She has no changes to made to her pre-existing advance directive. I have discussed the diagnosis with the patient and the intended plan as seen in the above orders. The patient has received an after-visit summary and questions were answered concerning future plans. I have discussed medication side effects and warnings with the patient as well. I have reviewed the plan of care with the patient, accepted their input and they are in agreement with the treatment goals.

## 2021-06-07 NOTE — ACP (ADVANCE CARE PLANNING)
Advance Care Planning  Advance Care Planning (ACP) Provider Conversation     Date of ACP Conversation: 06/07/21  Persons included in Conversation:  patient    Authorized Decision Maker (if patient is incapable of making informed decisions): This person is:   Named in Advance Directive or Healthcare Power of           For Patients with Decision Making Capacity:   Values/Goals: Exploration of values, goals, and preferences if recovery is not expected, even with continued medical treatment in the event of:  Imminent death  Severe, permanent brain injury    Conversation Outcomes / Follow-Up Plan:   ACP complete - no further action today. She has no changes to made to her pre-existing advance directive.     Dr. Wyatt Castellanos  Internists of [address], [address] [place] [address].  Phone: [phone]  Fax: [phone]

## 2021-06-07 NOTE — PROGRESS NOTES
INTERNISTS [facility]:  6/7/2021, MRN: [id number]      Juan A Worthington is a 67 y.o. female and presents to clinic for Follow-up, Cholesterol Problem, Labs (5-28-21), and Medication Evaluation (Per patients ins need to change to spiriva)    Subjective: The pt is a 73yo female with h/o obesity, hypothyroidism, HLD, COPD?, GERD, lumbar disc disease, osteoporosis, vitamin D deficiency, and lichen planus.     1. Osteoporosis: Her most recent bone density study shows findings consistent with osteoporosis. She is not presently on any medication. She takes vitamin D. She is planning to get dental implants. 2.  Hyperlipidemia: Controlled with simvastatin. No adverse side effects. Her most recent lipid panel is normal. She lost weight in between apts. 3.  Hypothyroidism: Treated with Synthroid/Euthyrox 112 mcg daily. Her most recent labs show that her TFTs are normal.    4. COPD: Well-controlled without incruse ellipta. This was too expensive and she is about to run out. She has been using her inhaler irregularly. She was told that Spiriva was more affordable. Use of albuterol: none this year. Tobacco use: none. Patient Active Problem List    Diagnosis Date Noted    Osteoporosis 05/19/2021    Lumbar disc disease 09/12/2019    Vitamin D deficiency 09/12/2019    Severe obesity ([address] 04/21/2019    Hypothyroidism     High cholesterol     COPD, mild (HCC)     Acid reflux     Lichen planus        Current Outpatient Medications   Medication Sig Dispense Refill    simvastatin (ZOCOR) 20 mg tablet Take 1 tablet by mouth nightly 90 Tab 2    Euthyrox 112 mcg tablet TAKE 1 TABLET BY MOUTH ONCE DAILY BEFORE BREAKFAST 90 Tab 2    cholecalciferol, vitamin D3, (VITAMIN D3) 2,000 unit tab Take 2,000 Units by mouth daily.  clobetasol (TEMOVATE) 0.05 % topical gel Apply  to affected area as needed (mouth sores). 15 g 3    aspirin delayed-release 81 mg tablet Take 81 mg by mouth daily.       multivitamin (ONE A DAY) tablet Take 1 Tab by mouth daily.  Incruse Ellipta 62.5 mcg/actuation inhaler Inhale 1 puff by mouth once daily (Patient not taking: Reported on 2021) 1 Inhaler 5       Allergies   Allergen Reactions    Penicillins Rash       Past Medical History:   Diagnosis Date    Acid reflux     COPD, mild (HCC)     High cholesterol     Hypothyroidism     Joint pain     Joint swelling     Lichen planus     Sinus problem     Urinary frequency        Past Surgical History:   Procedure Laterality Date    HX KNEE REPLACEMENT Bilateral     left , right 2016    HX PARTIAL HYSTERECTOMY  1967       Family History   Problem Relation Age of Onset    Heart Disease Mother     Hypertension Mother     No Known Problems Father     Breast Cancer Paternal Aunt     Diabetes Maternal Grandmother     Cancer Brother         pancreatic cancer       Social History     Tobacco Use    Smoking status: Former Smoker     Packs/day: 0.50     Years: 40.00     Pack years: 20.00     Types: Cigarettes     Quit date: 2009     Years since quittin.1    Smokeless tobacco: Never Used   Substance Use Topics    Alcohol use: Not Currently       ROS   Review of Systems   Constitutional: Negative for chills and fever. HENT: Negative for ear pain and sore throat. Eyes: Negative for blurred vision and pain. Respiratory: Negative for cough and shortness of breath. Cardiovascular: Negative for chest pain. Gastrointestinal: Negative for abdominal pain, blood in stool and melena. Genitourinary: Negative for dysuria and hematuria. Musculoskeletal: Positive for neck pain (it does not radiate and is off/on). Negative for joint pain and myalgias. Skin: Negative for rash. Neurological: Negative for tingling, focal weakness and headaches. Endo/Heme/Allergies: Does not bruise/bleed easily. Psychiatric/Behavioral: Negative for substance abuse.        Objective     Vitals:    21 2771 06/07/21 0929   BP: (!) 118/49 (!) 118/52   Pulse: 89    Resp: 16    Temp: 97.7 °F (36.5 °C)    TempSrc: Temporal    SpO2: 97%    Weight: 222 lb (100.7 kg)    Height: 5' 6.25\" (1.683 m)    PainSc:   0 - No pain        Physical Exam  Vitals and nursing note reviewed. HENT:      Head: Normocephalic and atraumatic. Right Ear: External ear normal.      Left Ear: External ear normal.   Eyes:      General: No scleral icterus. Right eye: No discharge. Left eye: No discharge. Conjunctiva/sclera: Conjunctivae normal.   Cardiovascular:      Rate and Rhythm: Normal rate and regular rhythm. Heart sounds: Normal heart sounds. No murmur heard. No friction rub. No gallop. Pulmonary:      Effort: Pulmonary effort is normal. No respiratory distress. Breath sounds: Normal breath sounds. No wheezing or rales. Abdominal:      General: Bowel sounds are normal. There is no distension. Palpations: Abdomen is soft. There is no mass. Tenderness: There is no abdominal tenderness. There is no guarding or rebound. Musculoskeletal:         General: No swelling (BUE) or tenderness (BUE). Cervical back: Neck supple. Lymphadenopathy:      Cervical: No cervical adenopathy. Skin:     General: Skin is warm and dry. Findings: No erythema. Neurological:      Mental Status: She is alert and oriented to person, place, and time. Motor: No abnormal muscle tone. Gait: Gait is intact.  Gait normal.   Psychiatric:         Mood and Affect: Mood and affect normal.         LABS   Data Review:   Lab Results   Component Value Date/Time    WBC 5.7 05/28/2021 09:30 AM    HGB 13.1 05/28/2021 09:30 AM    HCT 39.8 05/28/2021 09:30 AM    PLATELET 068 43/00/2007 09:30 AM    MCV 96 05/28/2021 09:30 AM       Lab Results   Component Value Date/Time    Sodium 143 05/28/2021 09:30 AM    Potassium 4.7 05/28/2021 09:30 AM    Chloride 104 05/28/2021 09:30 AM    CO2 25 05/28/2021 09:30 AM    Glucose 89 05/28/2021 09:30 AM    BUN 14 05/28/2021 09:30 AM    Creatinine 0.68 05/28/2021 09:30 AM    BUN/Creatinine ratio 21 05/28/2021 09:30 AM    GFR est AA 98 05/28/2021 09:30 AM    GFR est non-AA 85 05/28/2021 09:30 AM    Calcium 9.4 05/28/2021 09:30 AM       Lab Results   Component Value Date/Time    Cholesterol, total 154 05/28/2021 09:30 AM    HDL Cholesterol 64 05/28/2021 09:30 AM    LDL, calculated 77 05/28/2021 09:30 AM    LDL, calculated 82 03/06/2020 09:58 AM    VLDL, calculated 13 05/28/2021 09:30 AM    VLDL, calculated 16 03/06/2020 09:58 AM    Triglyceride 67 05/28/2021 09:30 AM       Lab Results   Component Value Date/Time    Hemoglobin A1c 5.3 04/23/2019 10:30 AM       Assessment/Plan:   1. Osteoporosis:  We discussed the option of medication. She needs to complete dental work for at least 6 months before we can consider rx. For now, continue with over-the-counter vitamin supplementation. 2.  Hyperlipidemia: Stable. +Obesity. Continue with Rx as prescribed. We will check her labs in a year. I encouraged her to maintain a heart healthy diet and to reduce her weight. 3.  Hypothyroidism: Stable. Continue with Rx as prescribed. We will check labs once again in a year. 4. COPD: Insurance will not cover Incruse inhaler. Ordering Sellplex       Health Maintenance Due   Topic Date Due    Hepatitis C Screening  Never done    Shingrix Vaccine Age 50> (1 of 2) Never done    Pneumococcal 65+ years (1 of 1 - PPSV23) Never done     Lab review: labs are reviewed in the EHR and ordered as mentioned above    I have discussed the diagnosis with the patient and the intended plan as seen in the above orders. The patient has received an after-visit summary and questions were answered concerning future plans. I have discussed medication side effects and warnings with the patient as well.  I have reviewed the plan of care with the patient, accepted their input and they are in agreement with the treatment goals. All questions were answered. The patient understands the plan of care. Handouts provided today with above information. Pt instructed if symptoms worsen to call the office or report to the ED for continued care. Greater than 50% of the visit time was spent in counseling and/or coordination of care. Voice recognition was used to generate this report, which may have resulted in some phonetic based errors in grammar and contents. Even though attempts were made to correct all the mistakes, some may have been missed, and remained in the body of the document. Follow-up and Dispositions    · Return in about 1 year (around 6/7/2022).          Laine Cartagena MD [id number]

## 2021-06-07 NOTE — PATIENT INSTRUCTIONS
Medicare Part B Preventive Services Limitations Recommendation Scheduled Bone Mass Measurement 
(age 72 & older, biennial) Requires diagnosis related to osteoporosis or estrogen deficiency. Biennial benefit unless patient has history of long-term glucocorticoid tx or baseline is needed because initial test was by other method This should be done at age 72 and again if on osteoporosis medication at 2-3 year intervals. Up to date Cardiovascular Screening Blood Tests (every 5 years) Total cholesterol, HDL, Triglycerides Order as a panel if possible We should check your cholesterol panel at least once every 5 years. Up to date Colorectal Cancer Screening 
-Fecal occult blood test (annual) -Flexible sigmoidoscopy (5y) 
-Screening colonoscopy (10y) -Barium Enema  Due per your Gastroenterologist's recommendations. Up to date Counseling to Prevent Tobacco Use (up to 8 sessions per year) - Counseling greater than 3 and up to 10 minutes - Counseling greater than 10 minutes Patients must be asymptomatic of tobacco-related conditions to receive as preventive service Continue with smoking cessation Not applicable Diabetes Screening Tests (at least every 3 years, Medicare covers annually or at 6-month intervals for prediabetic patients) Fasting blood sugar (FBS) or glucose tolerance test (GTT) Patient must be diagnosed with one of the following: 
-Hypertension, Dyslipidemia, obesity, previous impaired FBS or GTT 
Or any two of the following: overweight, FH of diabetes, age ? 72, history of gestational diabetes, birth of baby weighing more than 9 pounds Should be done yearly Up to date Diabetes Self-Management Training (DSMT) (no USPSTF recommendation) Requires referral by treating physician for patient with diabetes or renal disease. 10 hours of initial DSMT session of no less than 30 minutes each in a continuous 12-month period. 2 hours of follow-up DSMT in subsequent years.  Not applicable Not applicable Glaucoma Screening (no USPSTF recommendation) Diabetes mellitus, family history, , age 48 or over,  American, age 72 or over Continue with annual eye exams. Up to date Human Immunodeficiency Virus (HIV) Screening (annually for increased risk patients) HIV-1 and HIV-2 by EIA, RENETTA, rapid antibody test, or oral mucosa transudate Patient must be at increased risk for HIV infection per USPSTF guidelines or pregnant. Tests covered annually for patients at increased risk. Pregnant patients may receive up to 3 test during pregnancy. Not applicable Not applicable Medical Nutrition Therapy (MNT) (for diabetes or renal disease not recommended schedule) Requires referral by treating physician for patient with diabetes or renal disease. Can be provided in same year as diabetes self-management training (DSMT), and CMS recommends medical nutrition therapy take place after DSMT. Up to 3 hours for initial year and 2 hours in subsequent years. Not applicable Not applicable Shingles Vaccination  Vaccination recommended for shingles vaccination. Overdue Seasonal Influenza Vaccination (annually)  Continue with yearly \"flu\" shot annually Due later this year Pneumococcal Vaccination (once after 65)  Please receive this vaccination at age 72. Overdue per our records Hepatitis B Vaccinations (if medium/high risk) Medium/high risk factors:  End-stage renal disease, Hemophiliacs who received Factor VIII or IX concentrates, Clients of institutions for the mentally retarded, Persons who live in the same house as a HepB virus carrier, Homosexual men, Illicit injectable drug abusers. Not applicable Not applicable Screening Mammography (biennial age 54-69) Annually (age 36 or over) You need a mammogram yearly to screen for breast cancer. Up to date but we need to get your results Screening Pap Tests and Pelvic Examination (up to age 79 and after 79 if unknown history or abnormal study last 10 years) Every 24 months except high risk You need no Pap smear at this time. Up to date Ultrasound Screening for Abdominal Aortic Aneurysm (AAA) (once) Patient must be referred through IPPE and not have had a screening for abdominal aortic aneurysm before under Medicare. Limited to patients who meet one of the following criteria: 
- Men who are 73-68 years old and have smoked more than 100 cigarettes in their lifetime. 
-Anyone with a FH of AAA 
-Anyone recommended for screening by USPSTF Not applicable Not applicable Body Mass Index: Care Instructions Your Care Instructions Body mass index (BMI) can help you see if your weight is raising your risk for health problems. It uses a formula to compare how much you weigh with how tall you are. · A BMI lower than 18.5 is considered underweight. · A BMI between 18.5 and 24.9 is considered healthy. · A BMI between 25 and 29.9 is considered overweight. A BMI of 30 or higher is considered obese. If your BMI is in the normal range, it means that you have a lower risk for weight-related health problems. If your BMI is in the overweight or obese range, you may be at increased risk for weight-related health problems, such as high blood pressure, heart disease, stroke, arthritis or joint pain, and diabetes. If your BMI is in the underweight range, you may be at increased risk for health problems such as fatigue, lower protection (immunity) against illness, muscle loss, bone loss, hair loss, and hormone problems. BMI is just one measure of your risk for weight-related health problems. You may be at higher risk for health problems if you are not active, you eat an unhealthy diet, or you drink too much alcohol or use tobacco products. Follow-up care is a key part of your treatment and safety. Be sure to make and go to all appointments, and call your doctor if you are having problems.  It's also a good idea to know your test results and keep a list of the medicines you take. 
How can you care for yourself at home? · Practice healthy eating habits. This includes eating plenty of fruits, vegetables, whole grains, lean protein, and low-fat dairy. · If your doctor recommends it, get more exercise. Walking is a good choice. Bit by bit, increase the amount you walk every day. Try for at least 30 minutes on most days of the week. · Do not smoke. Smoking can increase your risk for health problems. If you need help quitting, talk to your doctor about stop-smoking programs and medicines. These can increase your chances of quitting for good. · Limit alcohol to 2 drinks a day for men and 1 drink a day for women. Too much alcohol can cause health problems. If you have a BMI higher than 25 · Your doctor may do other tests to check your risk for weight-related health problems. This may include measuring the distance around your waist. A waist measurement of more than 40 inches in men or 35 inches in women can increase the risk of weight-related health problems. · Talk with your doctor about steps you can take to stay healthy or improve your health. You may need to make lifestyle changes to lose weight and stay healthy, such as changing your diet and getting regular exercise. If you have a BMI lower than 18.5 · Your doctor may do other tests to check your risk for health problems. · Talk with your doctor about steps you can take to stay healthy or improve your health. You may need to make lifestyle changes to gain or maintain weight and stay healthy, such as getting more healthy foods in your diet and doing exercises to build muscle. Where can you learn more? Go to http://www.ValuNet.com/ Enter S176 in the search box to learn more about \"Body Mass Index: Care Instructions. \" Current as of: September 23, 2020               Content Version: 12.8 © 0745-7112 [employer].   
Care instructions adapted under license by [employer] (which disclaims liability or warranty for this information). If you have questions about a medical condition or this instruction, always ask your healthcare professional. [address] any warranty or liability for your use of this information. Well Visit, Over 72: Care Instructions Overview Well visits can help you stay healthy. Your doctor has checked your overall health and may have suggested ways to take good care of yourself. Your doctor also may have recommended tests. At home, you can help prevent illness with healthy eating, regular exercise, and other steps. Follow-up care is a key part of your treatment and safety. Be sure to make and go to all appointments, and call your doctor if you are having problems. It's also a good idea to know your test results and keep a list of the medicines you take. How can you care for yourself at home? · Get screening tests that you and your doctor decide on. Screening helps find diseases before any symptoms appear. · Eat healthy foods. Choose fruits, vegetables, whole grains, protein, and low-fat dairy foods. Limit fat, especially saturated fat. Reduce salt in your diet. · Limit alcohol. If you are a man, have no more than 2 drinks a day or 14 drinks a week. If you are a woman, have no more than 1 drink a day or 7 drinks a week. Since alcohol affects older adults differently, you may want to limit alcohol even more. Or you may not want to drink at all. · Get at least 30 minutes of exercise on most days of the week. Walking is a good choice. You also may want to do other activities, such as running, swimming, cycling, or playing tennis or team sports. · Reach and stay at a healthy weight. This will lower your risk for many problems, such as obesity, diabetes, heart disease, and high blood pressure. · Do not smoke. Smoking can make health problems worse.  If you need help quitting, talk to your doctor about stop-smoking programs and medicines. These can increase your chances of quitting for good. · Care for your mental health. It is easy to get weighed down by worry and stress. Learn strategies to manage stress, like deep breathing and mindfulness, and stay connected with your family and community. If you find you often feel sad or hopeless, talk with your doctor. Treatment can help. · Talk to your doctor about whether you have any risk factors for sexually transmitted infections (STIs). You can help prevent STIs if you wait to have sex with a new partner (or partners) until you've each been tested for STIs. It also helps if you use condoms (male or female condoms) and if you limit your sex partners to one person who only has sex with you. Vaccines are available for some STIs. · If you think you may have a problem with alcohol or drug use, talk to your doctor. This includes prescription medicines (such as amphetamines and opioids) and illegal drugs (such as cocaine and methamphetamine). Your doctor can help you figure out what type of treatment is best for you. · Protect your skin from too much sun. When you're outdoors from 10 a.m. to 4 p.m., stay in the shade or cover up with clothing and a hat with a wide brim. Wear sunglasses that block UV rays. Even when it's cloudy, put broad-spectrum sunscreen (SPF 30 or higher) on any exposed skin. · See a dentist one or two times a year for checkups and to have your teeth cleaned. · Wear a seat belt in the car. When should you call for help? Watch closely for changes in your health, and be sure to contact your doctor if you have any problems or symptoms that concern you. Where can you learn more? Go to http://www.gray.com/ Enter O419 in the search box to learn more about \"Well Visit, Over 65: Care Instructions. \" Current as of: May 27, 2020               Content Version: 12.8 © 7197-2179 [employer].   
Care instructions adapted under license by [employer] (which disclaims liability or warranty for this information). If you have questions about a medical condition or this instruction, always ask your healthcare professional. [address] any warranty or liability for your use of this information. Medicare Wellness Visit, Female The best way to live healthy is to have a lifestyle where you eat a well-balanced diet, exercise regularly, limit alcohol use, and quit all forms of tobacco/nicotine, if applicable. Regular preventive services are another way to keep healthy. Preventive services (vaccines, screening tests, monitoring & exams) can help personalize your care plan, which helps you manage your own care. Screening tests can find health problems at the earliest stages, when they are easiest to treat. [facility] follows the current, evidence-based guidelines published by the [facility] ([facility]) when recommending preventive services for our patients. Because we follow these guidelines, sometimes recommendations change over time as research supports it. (For example, mammograms used to be recommended annually. Even though Medicare will still pay for an annual mammogram, the newer guidelines recommend a mammogram every two years for women of average risk). Of course, you and your doctor may decide to screen more often for some diseases, based on your risk and your co-morbidities (chronic disease you are already diagnosed with). Preventive services for you include: - Medicare offers their members a free annual wellness visit, which is time for you and your primary care provider to discuss and plan for your preventive service needs. Take advantage of this benefit every year! 
-All adults over the age of 72 should receive the recommended pneumonia vaccines.  Current USPSTF guidelines recommend a series of two vaccines for the best pneumonia protection.  
-All adults should have a flu vaccine yearly and a tetanus vaccine every 10 years.  
-All adults age 48 and older should receive the shingles vaccines (series of two vaccines). -All adults age 38-68 who are overweight should have a diabetes screening test once every three years.  
-All adults born between [birth year] and 1965 should be screened once for Hepatitis C. 
-Other screening tests and preventive services for persons with diabetes include: an eye exam to screen for diabetic retinopathy, a kidney function test, a foot exam, and stricter control over your cholesterol.  
-Cardiovascular screening for adults with routine risk involves an electrocardiogram (ECG) at intervals determined by your doctor.  
-Colorectal cancer screenings should be done for adults age 54-65 with no increased risk factors for colorectal cancer. There are a number of acceptable methods of screening for this type of cancer. Each test has its own benefits and drawbacks. Discuss with your doctor what is most appropriate for you during your annual wellness visit. The different tests include: colonoscopy (considered the best screening method), a fecal occult blood test, a fecal DNA test, and sigmoidoscopy. 
 
-A bone mass density test is recommended when a woman turns 65 to screen for osteoporosis. This test is only recommended one time, as a screening. Some providers will use this same test as a disease monitoring tool if you already have osteoporosis. -Breast cancer screenings are recommended every other year for women of normal risk, age 54-69. 
-Cervical cancer screenings for women over age 72 are only recommended with certain risk factors. Here is a list of your current Health Maintenance items (your personalized list of preventive services) with a due date: 
Health Maintenance Due Topic Date Due  
 Hepatitis C Test  Never done  Shingles Vaccine (1 of 2) Never done  Pneumococcal Vaccine (1 of 1 - PPSV23) Never done

## 2021-06-07 NOTE — TELEPHONE ENCOUNTER
----- Message from Abilio Hernández MD sent at 6/7/2021 11:34 AM EDT -----  Please let her know that her mammogram results are unremarkable. She needs a follow-up study in a year.     Dr. Paul Blocker  [facility], [address] [place] [address], [address] [place] [address].  Phone: [phone]  Fax: [phone]

## 2022-03-18 PROBLEM — M81.0 OSTEOPOROSIS: Status: ACTIVE | Noted: 2021-05-19

## 2022-03-19 PROBLEM — E55.9 VITAMIN D DEFICIENCY: Status: ACTIVE | Noted: 2019-09-12

## 2022-03-20 PROBLEM — M51.9 LUMBAR DISC DISEASE: Status: ACTIVE | Noted: 2019-09-12

## 2022-03-20 PROBLEM — E66.01 SEVERE OBESITY (HCC): Status: ACTIVE | Noted: 2019-04-21

## 2022-04-19 ENCOUNTER — OFFICE VISIT (OUTPATIENT)
Dept: INTERNAL MEDICINE CLINIC | Age: 77
End: 2022-04-19
Payer: MEDICARE

## 2022-04-19 VITALS
HEIGHT: 66 IN | SYSTOLIC BLOOD PRESSURE: 128 MMHG | DIASTOLIC BLOOD PRESSURE: 62 MMHG | WEIGHT: 214 LBS | TEMPERATURE: 98.6 F | HEART RATE: 81 BPM | OXYGEN SATURATION: 96 % | RESPIRATION RATE: 14 BRPM | BODY MASS INDEX: 34.39 KG/M2

## 2022-04-19 DIAGNOSIS — J44.9 COPD, MILD (HCC): ICD-10-CM

## 2022-04-19 DIAGNOSIS — N30.00 ACUTE CYSTITIS WITHOUT HEMATURIA: Primary | ICD-10-CM

## 2022-04-19 LAB
BILIRUB UR QL STRIP: NEGATIVE
GLUCOSE UR-MCNC: NEGATIVE MG/DL
KETONES P FAST UR STRIP-MCNC: NEGATIVE MG/DL
PH UR STRIP: 7 [PH] (ref 4.6–8)
PROT UR QL STRIP: NORMAL
SP GR UR STRIP: 1.02 (ref 1–1.03)
UA UROBILINOGEN AMB POC: NORMAL (ref 0.2–1)
URINALYSIS CLARITY POC: NORMAL
URINALYSIS COLOR POC: YELLOW
URINE BLOOD POC: NORMAL
URINE LEUKOCYTES POC: NORMAL
URINE NITRITES POC: NEGATIVE

## 2022-04-19 PROCEDURE — 1101F PT FALLS ASSESS-DOCD LE1/YR: CPT | Performed by: INTERNAL MEDICINE

## 2022-04-19 PROCEDURE — G8427 DOCREV CUR MEDS BY ELIG CLIN: HCPCS | Performed by: INTERNAL MEDICINE

## 2022-04-19 PROCEDURE — G8536 NO DOC ELDER MAL SCRN: HCPCS | Performed by: INTERNAL MEDICINE

## 2022-04-19 PROCEDURE — 81001 URINALYSIS AUTO W/SCOPE: CPT | Performed by: INTERNAL MEDICINE

## 2022-04-19 PROCEDURE — G8432 DEP SCR NOT DOC, RNG: HCPCS | Performed by: INTERNAL MEDICINE

## 2022-04-19 PROCEDURE — G8417 CALC BMI ABV UP PARAM F/U: HCPCS | Performed by: INTERNAL MEDICINE

## 2022-04-19 PROCEDURE — 1090F PRES/ABSN URINE INCON ASSESS: CPT | Performed by: INTERNAL MEDICINE

## 2022-04-19 PROCEDURE — 99214 OFFICE O/P EST MOD 30 MIN: CPT | Performed by: INTERNAL MEDICINE

## 2022-04-19 RX ORDER — CIPROFLOXACIN 500 MG/1
500 TABLET ORAL DAILY
Qty: 3 TABLET | Refills: 0 | Status: SHIPPED | OUTPATIENT
Start: 2022-04-19 | End: 2022-04-22

## 2022-04-19 NOTE — PROGRESS NOTES
Evert Hedrick presents today for   Chief Complaint   Patient presents with    Dysuria     x 2 weeks. patient denies any hematuria    Urgency       1. \"Have you been to the ER, urgent care clinic since your last visit? Hospitalized since your last visit? \" NO    2. \"Have you seen or consulted any other health care providers outside of the [address] [place] [address] since your last visit? \" YES     3. For patients aged 39-70: Has the patient had a colonoscopy / FIT/ Cologuard? NA - based on age      If the patient is female:    4. For patients aged 41-77: Has the patient had a mammogram within the past 2 years? NA - based on age or sex  See top three    5. For patients aged 21-65: Has the patient had a pap smear?  NA - based on age or sex

## 2022-04-19 NOTE — PROGRESS NOTES
INTERNISTS OF [facility]:  4/19/2022, MRN: [id number]      Manuel Baig is a 67 y.o. female and presents to clinic for Dysuria (x 2 weeks. patient denies any hematuria) and Urgency      Subjective: The pt is a 65yo female with h/o obesity, hypothyroidism, HLD, COPD?, GERD, lumbar disc disease, vitamin D deficiency, and lichen planus.     1. Dysuria: Present for 2 weeks. No hematuria. She has associated urgency. Abdominal pain: None. Fever: None. +Taking cranberry extract did not improve the burning she has of urination. 2. COPD: Shortness of breath/cough: She reports very little shortness of breath and cough symptoms. Her  states that over the past few weeks, she has been coughing more. She associates her symptoms with seasonal allergies. Taking Spiriva. No tobacco use. Patient Active Problem List    Diagnosis Date Noted    Osteoporosis 05/19/2021    Lumbar disc disease 09/12/2019    Vitamin D deficiency 09/12/2019    Severe obesity ([address] 04/21/2019    Hypothyroidism     High cholesterol     COPD, mild (HCC)     Acid reflux     Lichen planus        Current Outpatient Medications   Medication Sig Dispense Refill    simvastatin (ZOCOR) 20 mg tablet Take 1 tablet by mouth nightly 90 Tablet 3    Euthyrox 112 mcg tablet TAKE 1 TABLET BY MOUTH ONCE DAILY BEFORE BREAKFAST 90 Tablet 3    tiotropium (SPIRIVA) 18 mcg inhalation capsule Take 1 Capsule by inhalation daily. 30 Capsule 11    cholecalciferol, vitamin D3, (VITAMIN D3) 2,000 unit tab Take 2,000 Units by mouth daily.  clobetasol (TEMOVATE) 0.05 % topical gel Apply  to affected area as needed (mouth sores). 15 g 3    aspirin delayed-release 81 mg tablet Take 81 mg by mouth daily.  multivitamin (ONE A DAY) tablet Take 1 Tab by mouth daily.          Allergies   Allergen Reactions    Penicillins Rash       Past Medical History:   Diagnosis Date    Acid reflux     COPD, mild (HCC)     High cholesterol     Hypothyroidism     Joint pain     Joint swelling     Lichen planus     Sinus problem     Urinary frequency        Past Surgical History:   Procedure Laterality Date    HX KNEE REPLACEMENT Bilateral     left , right 2016    HX PARTIAL HYSTERECTOMY  1967       Family History   Problem Relation Age of Onset    Heart Disease Mother     Hypertension Mother     No Known Problems Father     Breast Cancer Paternal Aunt     Diabetes Maternal Grandmother     Cancer Brother         pancreatic cancer       Social History     Tobacco Use    Smoking status: Former Smoker     Packs/day: 0.50     Years: 40.00     Pack years: 20.00     Types: Cigarettes     Quit date: 2009     Years since quittin.0    Smokeless tobacco: Never Used   Substance Use Topics    Alcohol use: Not Currently       ROS   Review of Systems   Constitutional: Negative for chills and fever. HENT: Negative for ear pain and sore throat. Eyes: Negative for blurred vision and pain. Respiratory: Positive for cough (present x 3 wks). Negative for sputum production and shortness of breath. Cardiovascular: Negative for chest pain. Gastrointestinal: Negative for abdominal pain, blood in stool and melena. Genitourinary: Positive for dysuria and urgency. Negative for hematuria. Musculoskeletal: Negative for joint pain and myalgias. Skin: Negative for rash. Neurological: Negative for headaches. Endo/Heme/Allergies: Does not bruise/bleed easily. Psychiatric/Behavioral: Negative for substance abuse. Objective     Vitals:    22 0945   Pulse: 81   Resp: 14   Temp: 98.6 °F (37 °C)   TempSrc: Temporal   SpO2: 96%   Weight: 214 lb (97.1 kg)   Height: 5' 6.25\" (1.683 m)   PainSc:   5   PainLoc: Abdomen       Physical Exam  Vitals and nursing note reviewed. HENT:      Head: Normocephalic and atraumatic. Right Ear: External ear normal.      Left Ear: External ear normal.   Eyes:      General: No scleral icterus. Right eye: No discharge. Left eye: No discharge. Conjunctiva/sclera: Conjunctivae normal.   Cardiovascular:      Rate and Rhythm: Normal rate and regular rhythm. Heart sounds: Normal heart sounds. No murmur heard. No friction rub. No gallop. Pulmonary:      Effort: Pulmonary effort is normal. No respiratory distress. Comments: Soft expiratory wheezing - scattered  Abdominal:      General: Bowel sounds are normal. There is no distension. Palpations: Abdomen is soft. There is no mass. Tenderness: There is no abdominal tenderness. There is no guarding or rebound. Musculoskeletal:         General: No swelling (BUE) or tenderness (BUE). Cervical back: Neck supple. Lymphadenopathy:      Cervical: No cervical adenopathy. Skin:     General: Skin is warm and dry. Findings: No erythema or rash. Neurological:      Mental Status: She is alert. Motor: No abnormal muscle tone.       Gait: Gait normal.   Psychiatric:         Mood and Affect: Mood normal.         LABS   Data Review:   Lab Results   Component Value Date/Time    WBC 5.7 05/28/2021 09:30 AM    HGB 13.1 05/28/2021 09:30 AM    HCT 39.8 05/28/2021 09:30 AM    PLATELET 335 09/08/9135 09:30 AM    MCV 96 05/28/2021 09:30 AM       Lab Results   Component Value Date/Time    Sodium 143 05/28/2021 09:30 AM    Potassium 4.7 05/28/2021 09:30 AM    Chloride 104 05/28/2021 09:30 AM    CO2 25 05/28/2021 09:30 AM    Glucose 89 05/28/2021 09:30 AM    BUN 14 05/28/2021 09:30 AM    Creatinine 0.68 05/28/2021 09:30 AM    BUN/Creatinine ratio 21 05/28/2021 09:30 AM    GFR est AA 98 05/28/2021 09:30 AM    GFR est non-AA 85 05/28/2021 09:30 AM    Calcium 9.4 05/28/2021 09:30 AM       Lab Results   Component Value Date/Time    Cholesterol, total 154 05/28/2021 09:30 AM    HDL Cholesterol 64 05/28/2021 09:30 AM    LDL, calculated 77 05/28/2021 09:30 AM    LDL, calculated 82 03/06/2020 09:58 AM    VLDL, calculated 13 05/28/2021 09:30 AM    VLDL, calculated 16 03/06/2020 09:58 AM    Triglyceride 67 05/28/2021 09:30 AM       Lab Results   Component Value Date/Time    Hemoglobin A1c 5.3 04/23/2019 10:30 AM       Assessment/Plan:   1. Dysuria/UTI: UA findings and positive leukocyte esterase. This is consistent with a UTI. Ordering a course of ciprofloxacin to cover for UTI.  We will check a urine culture as well.  I instructed her to notify me if develops any adverse side effects on this medication. ORDERS:  - AMB POC URINALYSIS DIP STICK AUTO W/ MICRO  - CULTURE, URINE; Future  - ciprofloxacin HCl (CIPRO) 500 mg tablet; Take 1 Tablet by mouth daily for 3 days. Dispense: 3 Tablet; Refill: 0    2. COPD, mild: Stable. Continue with Spiriva.  She will notify me if she develops any worsening cough and shortness of breath symptoms. Health Maintenance Due   Topic Date Due    Hepatitis C Screening  Never done    Pneumococcal 65+ years (1 - PCV) Never done    Shingrix Vaccine Age 50> (1 of 2) Never done    Low dose CT lung screening  Never done     Lab review: labs are reviewed in the EHR     I have discussed the diagnosis with the patient and the intended plan as seen in the above orders. The patient has received an after-visit summary and questions were answered concerning future plans. I have discussed medication side effects and warnings with the patient as well. I have reviewed the plan of care with the patient, accepted their input and they are in agreement with the treatment goals. All questions were answered. The patient understands the plan of care. Handouts provided today with above information. Pt instructed if symptoms worsen to call the office or report to the ED for continued care. Greater than 50% of the visit time was spent in counseling and/or coordination of care. Voice recognition was used to generate this report, which may have resulted in some phonetic based errors in grammar and contents.  Even though attempts were made to correct all the mistakes, some may have been missed, and remained in the body of the document.           Yadi Youssef MD

## 2022-04-19 NOTE — PATIENT INSTRUCTIONS
Urinary Tract Infection (UTI) in Women: Care Instructions  Overview     A urinary tract infection, or UTI, is a general term for an infection anywhere between the kidneys and the urethra (where urine comes out). Most UTIs are bladder infections. They often cause pain or burning when you urinate. UTIs are caused by bacteria and can be cured with antibiotics. Be sure to complete your treatment so that the infection does not get worse. Follow-up care is a key part of your treatment and safety. Be sure to make and go to all appointments, and call your doctor if you are having problems. It's also a good idea to know your test results and keep a list of the medicines you take. How can you care for yourself at home? · Take your antibiotics as directed. Do not stop taking them just because you feel better. You need to take the full course of antibiotics. · Drink extra water and other fluids for the next day or two. This will help make the urine less concentrated and help wash out the bacteria that are causing the infection. (If you have kidney, heart, or liver disease and have to limit fluids, talk with your doctor before you increase the amount of fluids you drink.)  · Avoid drinks that are carbonated or have caffeine. They can irritate the bladder. · Urinate often. Try to empty your bladder each time. · To relieve pain, take a hot bath or lay a heating pad set on low over your lower belly or genital area. Never go to sleep with a heating pad in place. To prevent UTIs  · Drink plenty of water each day. This helps you urinate often, which clears bacteria from your system. (If you have kidney, heart, or liver disease and have to limit fluids, talk with your doctor before you increase the amount of fluids you drink.)  · Urinate when you need to. · If you are sexually active, urinate right after you have sex. · Change sanitary pads often.   · Avoid douches, bubble baths, feminine hygiene sprays, and other feminine hygiene products that have deodorants. · After going to the bathroom, wipe from front to back. When should you call for help? Call your doctor now or seek immediate medical care if:    · Symptoms such as fever, chills, nausea, or vomiting get worse or appear for the first time.     · You have new pain in your back just below your rib cage. This is called flank pain.     · There is new blood or pus in your urine.     · You have any problems with your antibiotic medicine. Watch closely for changes in your health, and be sure to contact your doctor if:    · You are not getting better after taking an antibiotic for 2 days.     · Your symptoms go away but then come back. Where can you learn more? Go to http://www.gray.com/  Enter L611 in the search box to learn more about \"Urinary Tract Infection (UTI) in Women: Care Instructions. \"  Current as of: October 18, 2021               Content Version: 13.2  © 2006-2022 [employer]. Care instructions adapted under license by [employer] (which disclaims liability or warranty for this information). If you have questions about a medical condition or this instruction, always ask your healthcare professional. [address] any warranty or liability for your use of this information.

## 2022-04-21 LAB — BACTERIA UR CULT: ABNORMAL

## 2022-04-25 ENCOUNTER — TELEPHONE (OUTPATIENT)
Dept: INTERNAL MEDICINE CLINIC | Age: 77
End: 2022-04-25

## 2022-04-25 DIAGNOSIS — N39.0 URINARY TRACT INFECTION WITHOUT HEMATURIA, SITE UNSPECIFIED: Primary | ICD-10-CM

## 2022-04-25 RX ORDER — SULFAMETHOXAZOLE AND TRIMETHOPRIM 800; 160 MG/1; MG/1
1 TABLET ORAL 2 TIMES DAILY
Qty: 10 TABLET | Refills: 0 | Status: SHIPPED | OUTPATIENT
Start: 2022-04-25 | End: 2022-04-30

## 2022-04-25 NOTE — TELEPHONE ENCOUNTER
Please let her know that I am ordering Bactrim for 5 days. If her symptoms do not improve, please have her get a urinalysis and urine culture. I will put an order in her chart for this study if she does not respond to the Bactrim.     Dr. Aden Askew  [facility], [address] [place] [address], [address] [place] [address] Str.  Phone: [phone]  Fax: [phone]

## 2022-04-25 NOTE — TELEPHONE ENCOUNTER
Pt says she was seen last week for uti. Says she is no better. Wants to know if she needs different meds?

## 2022-06-01 ENCOUNTER — APPOINTMENT (OUTPATIENT)
Dept: INTERNAL MEDICINE CLINIC | Age: 77
End: 2022-06-01

## 2022-06-09 ENCOUNTER — TELEPHONE (OUTPATIENT)
Dept: INTERNAL MEDICINE CLINIC | Age: 77
End: 2022-06-09

## 2022-06-09 ENCOUNTER — OFFICE VISIT (OUTPATIENT)
Dept: INTERNAL MEDICINE CLINIC | Age: 77
End: 2022-06-09
Payer: MEDICARE

## 2022-06-09 VITALS
HEIGHT: 66 IN | WEIGHT: 212 LBS | HEART RATE: 91 BPM | DIASTOLIC BLOOD PRESSURE: 60 MMHG | OXYGEN SATURATION: 97 % | TEMPERATURE: 98.1 F | SYSTOLIC BLOOD PRESSURE: 109 MMHG | RESPIRATION RATE: 12 BRPM | BODY MASS INDEX: 34.07 KG/M2

## 2022-06-09 DIAGNOSIS — Z00.00 MEDICARE ANNUAL WELLNESS VISIT, SUBSEQUENT: Primary | ICD-10-CM

## 2022-06-09 DIAGNOSIS — L43.9 LICHEN PLANUS: ICD-10-CM

## 2022-06-09 DIAGNOSIS — E03.9 HYPOTHYROIDISM, UNSPECIFIED TYPE: ICD-10-CM

## 2022-06-09 DIAGNOSIS — Z87.440 HISTORY OF UTI: ICD-10-CM

## 2022-06-09 DIAGNOSIS — J44.9 COPD, MILD (HCC): ICD-10-CM

## 2022-06-09 DIAGNOSIS — E78.00 HIGH CHOLESTEROL: ICD-10-CM

## 2022-06-09 PROCEDURE — G8417 CALC BMI ABV UP PARAM F/U: HCPCS | Performed by: INTERNAL MEDICINE

## 2022-06-09 PROCEDURE — 1123F ACP DISCUSS/DSCN MKR DOCD: CPT | Performed by: INTERNAL MEDICINE

## 2022-06-09 PROCEDURE — 1101F PT FALLS ASSESS-DOCD LE1/YR: CPT | Performed by: INTERNAL MEDICINE

## 2022-06-09 PROCEDURE — G0439 PPPS, SUBSEQ VISIT: HCPCS | Performed by: INTERNAL MEDICINE

## 2022-06-09 PROCEDURE — G8536 NO DOC ELDER MAL SCRN: HCPCS | Performed by: INTERNAL MEDICINE

## 2022-06-09 PROCEDURE — G8427 DOCREV CUR MEDS BY ELIG CLIN: HCPCS | Performed by: INTERNAL MEDICINE

## 2022-06-09 PROCEDURE — G8510 SCR DEP NEG, NO PLAN REQD: HCPCS | Performed by: INTERNAL MEDICINE

## 2022-06-09 RX ORDER — CLOBETASOL PROPIONATE 0.05 MG/G
GEL TOPICAL AS NEEDED
Qty: 15 G | Refills: 3 | Status: SHIPPED | OUTPATIENT
Start: 2022-06-09

## 2022-06-09 NOTE — TELEPHONE ENCOUNTER
Return in about 1 year (around 6/9/2023). Check out comments: Please schedule for labs 1 wk before her apt. 40 min apt     Pt declined to schedule at check out.  She will call to schedule at a later date

## 2022-06-09 NOTE — PROGRESS NOTES
INTERNISTS OF [facility]:  6/9/2022, MRN: [id number]      Aysha Byrnes is a 67 y.o. female and presents to clinic for Follow-up, Annual Wellness Visit, Medication Refill, and Labs (6-1-22)      Subjective: The pt is a 67yo female with h/o obesity, hypothyroidism, HLD, COPD?, GERD, lumbar disc disease, vitamin D deficiency, and lichen planus.     1.  UTI: Status post antibiotic rx earlier this year. Asymptomatic. 2.  Lichen planus: She has mouth sores off and on and it responds well to clobetasol topical Rx. She needs refill on this medication. Today she reports: \"They're not flaring up a lot. I only use it when I absolutely need to. \" She requires it every 3 months. 3.  COPD: She is a former smoker. She is on Spiriva. Her breathing worsens when her allergic rhinitis flares up. Her breathing is \"good. \" She takes an antihistamine OTC rx.    4.  Hypothyroidism: Treated with Euthyrox 112 mcg daily. Her most recent labs show normal TFTs. 5.  HLD: Blood pressure is 109/60. Her recent labs show a normal lipid panel. +Zocor. Her lipid panel is WNL. 6. Health Maintenance:  - She overdue for Tdap, shingles, and pneumococcal vaccines  - She declines lung cancer screening  - No ETOH. No drug use. No tobacco use.        Patient Active Problem List    Diagnosis Date Noted    Osteoporosis 05/19/2021    Lumbar disc disease 09/12/2019    Vitamin D deficiency 09/12/2019    Severe [address] [address] 04/21/2019    Hypothyroidism     High cholesterol     COPD, mild (HCC)     Acid reflux     Lichen planus        Current Outpatient Medications   Medication Sig Dispense Refill    simvastatin (ZOCOR) 20 mg tablet Take 1 tablet by mouth nightly 90 Tablet 3    Euthyrox 112 mcg tablet TAKE 1 TABLET BY MOUTH ONCE DAILY BEFORE BREAKFAST 90 Tablet 3    tiotropium (SPIRIVA) 18 mcg inhalation capsule Take 1 Capsule by inhalation daily.  30 Capsule 11    cholecalciferol, vitamin D3, (VITAMIN D3) 2,000 unit tab Take 2,000 Units by mouth daily.  clobetasol (TEMOVATE) 0.05 % topical gel Apply  to affected area as needed (mouth sores). 15 g 3    aspirin delayed-release 81 mg tablet Take 81 mg by mouth daily.  multivitamin (ONE A DAY) tablet Take 1 Tab by mouth daily. Allergies   Allergen Reactions    Penicillins Rash       Past Medical History:   Diagnosis Date    Acid reflux     COPD, mild (HCC)     High cholesterol     Hypothyroidism     Joint pain     Joint swelling     Lichen planus     Sinus problem     Urinary frequency        Past Surgical History:   Procedure Laterality Date    HX KNEE REPLACEMENT Bilateral     left , right     HX PARTIAL HYSTERECTOMY  1967       Family History   Problem Relation Age of Onset    Heart Disease Mother     Hypertension Mother     No Known Problems Father     Breast Cancer Paternal Aunt     Diabetes Maternal Grandmother     Cancer Brother         pancreatic cancer       Social History     Tobacco Use    Smoking status: Former Smoker     Packs/day: 0.50     Years: 40.00     Pack years: 20.00     Types: Cigarettes     Quit date: 2009     Years since quittin.1    Smokeless tobacco: Never Used   Substance Use Topics    Alcohol use: Not Currently       ROS   Review of Systems   Constitutional: Negative for chills and fever. HENT: Negative for ear pain and sore throat. Eyes: Negative for blurred vision and pain. Respiratory: Negative for cough and shortness of breath. Cardiovascular: Negative for chest pain. Gastrointestinal: Negative for abdominal pain, blood in stool and melena. Genitourinary: Negative for dysuria and hematuria. Musculoskeletal: Negative for joint pain and myalgias. Skin: Negative for rash. Neurological: Negative for headaches. Endo/Heme/Allergies: Positive for environmental allergies. Does not bruise/bleed easily. Psychiatric/Behavioral: Negative for substance abuse.        Objective     Vitals: 06/09/22 0918   BP: 109/60   Pulse: 91   Resp: 12   Temp: 98.1 °F (36.7 °C)   TempSrc: Temporal   SpO2: 97%   Weight: 212 lb (96.2 kg)   Height: 5' 6.25\" (1.683 m)   PainSc:   0 - No pain       Physical Exam  Vitals and nursing note reviewed. HENT:      Head: Normocephalic and atraumatic. Right Ear: External ear normal.      Left Ear: External ear normal.   Eyes:      General: No scleral icterus. Right eye: No discharge. Left eye: No discharge. Conjunctiva/sclera: Conjunctivae normal.   Cardiovascular:      Rate and Rhythm: Normal rate and regular rhythm. Heart sounds: Normal heart sounds. No murmur heard. No friction rub. No gallop. Pulmonary:      Effort: Pulmonary effort is normal. No respiratory distress. Breath sounds: Normal breath sounds. No wheezing or rales. Chest:      Chest wall: No tenderness. Abdominal:      General: Bowel sounds are normal. There is no distension. Palpations: Abdomen is soft. There is no mass. Tenderness: There is no abdominal tenderness. There is no guarding or rebound. Musculoskeletal:         General: No swelling (BUE) or tenderness (BUE). Cervical back: Neck supple. Lymphadenopathy:      Cervical: No cervical adenopathy. Skin:     General: Skin is warm and dry. Findings: No erythema or rash. Neurological:      Mental Status: She is alert. Motor: No abnormal muscle tone.       Gait: Gait normal.   Psychiatric:         Mood and Affect: Mood normal.         LABS   Data Review:   Lab Results   Component Value Date/Time    WBC 5.7 05/28/2021 09:30 AM    HGB 13.1 05/28/2021 09:30 AM    HCT 39.8 05/28/2021 09:30 AM    PLATELET 630 02/83/2860 09:30 AM    MCV 96 05/28/2021 09:30 AM       Lab Results   Component Value Date/Time    Sodium 143 05/28/2021 09:30 AM    Potassium 4.7 05/28/2021 09:30 AM    Chloride 104 05/28/2021 09:30 AM    CO2 25 05/28/2021 09:30 AM    Glucose 89 05/28/2021 09:30 AM    BUN 14 05/28/2021 09:30 AM    Creatinine 0.68 05/28/2021 09:30 AM    BUN/Creatinine ratio 21 05/28/2021 09:30 AM    GFR est AA 98 05/28/2021 09:30 AM    GFR est non-AA 85 05/28/2021 09:30 AM    Calcium 9.4 05/28/2021 09:30 AM       Lab Results   Component Value Date/Time    Cholesterol, total 154 05/28/2021 09:30 AM    HDL Cholesterol 64 05/28/2021 09:30 AM    LDL, calculated 77 05/28/2021 09:30 AM    LDL, calculated 82 03/06/2020 09:58 AM    VLDL, calculated 13 05/28/2021 09:30 AM    VLDL, calculated 16 03/06/2020 09:58 AM    Triglyceride 67 05/28/2021 09:30 AM       Lab Results   Component Value Date/Time    Hemoglobin A1c 5.3 04/23/2019 10:30 AM       Assessment/Plan:   1. Lichen Planus: Asymptomatic today. - Refilling her clobetasol topical rx    2. Hypothyroidism: Stable. - C/w rx as prescribed. - Checking labs in a year    3. HLD: Stable. - C/w rx as prescribed. - Checking labs in a year    4. UTI Hx: S/p rx. Asymptomatic. Observation    5. COPD: Stable. - C/w rx as prescribed. Health Maintenance Due   Topic Date Due    Hepatitis C Screening  Never done    Shingrix Vaccine Age 50> (1 of 2) Never done    Low dose CT lung screening  Never done    Lipid Screen  05/28/2022    Medicare Yearly Exam  06/08/2022     Lab review: labs are reviewed in the EHR and ordered as mentioned above    I have discussed the diagnosis with the patient and the intended plan as seen in the above orders. The patient has received an after-visit summary and questions were answered concerning future plans. I have discussed medication side effects and warnings with the patient as well. I have reviewed the plan of care with the patient, accepted their input and they are in agreement with the treatment goals. All questions were answered. The patient understands the plan of care. Handouts provided today with above information. Pt instructed if symptoms worsen to call the office or report to the ED for continued care.   Greater than 50% of the visit time was spent in counseling and/or coordination of care. Voice recognition was used to generate this report, which may have resulted in some phonetic based errors in grammar and contents. Even though attempts were made to correct all the mistakes, some may have been missed, and remained in the body of the document.           Farhan Guerrero MD

## 2022-06-09 NOTE — PATIENT INSTRUCTIONS
Body Mass Index: Care Instructions  Your Care Instructions     Body mass index (BMI) can help you see if your weight is raising your risk for health problems. It uses a formula to compare how much you weigh with how tall you are. · A BMI lower than 18.5 is considered underweight. · A BMI between 18.5 and 24.9 is considered healthy. · A BMI between 25 and 29.9 is considered overweight. A BMI of 30 or higher is considered obese. If your BMI is in the normal range, it means that you have a lower risk for weight-related health problems. If your BMI is in the overweight or obese range, you may be at increased risk for weight-related health problems, such as high blood pressure, heart disease, stroke, arthritis or joint pain, and diabetes. If your BMI is in the underweight range, you may be at increased risk for health problems such as fatigue, lower protection (immunity) against illness, muscle loss, bone loss, hair loss, and hormone problems. BMI is just one measure of your risk for weight-related health problems. You may be at higher risk for health problems if you are not active, you eat an unhealthy diet, or you drink too much alcohol or use tobacco products. Follow-up care is a key part of your treatment and safety. Be sure to make and go to all appointments, and call your doctor if you are having problems. It's also a good idea to know your test results and keep a list of the medicines you take. How can you care for yourself at home? · Practice healthy eating habits. This includes eating plenty of fruits, vegetables, whole grains, lean protein, and low-fat dairy. · If your doctor recommends it, get more exercise. Walking is a good choice. Bit by bit, increase the amount you walk every day. Try for at least 30 minutes on most days of the week. · Do not smoke. Smoking can increase your risk for health problems. If you need help quitting, talk to your doctor about stop-smoking programs and medicines. These can increase your chances of quitting for good. · Limit alcohol to 2 drinks a day for men and 1 drink a day for women. Too much alcohol can cause health problems. If you have a BMI higher than 25  · Your doctor may do other tests to check your risk for weight-related health problems. This may include measuring the distance around your waist. A waist measurement of more than 40 inches in men or 35 inches in women can increase the risk of weight-related health problems. · Talk with your doctor about steps you can take to stay healthy or improve your health. You may need to make lifestyle changes to lose weight and stay healthy, such as changing your diet and getting regular exercise. If you have a BMI lower than 18.5  · Your doctor may do other tests to check your risk for health problems. · Talk with your doctor about steps you can take to stay healthy or improve your health. You may need to make lifestyle changes to gain or maintain weight and stay healthy, such as getting more healthy foods in your diet and doing exercises to build muscle. Where can you learn more? Go to http://www.wise.com/  Enter S176 in the search box to learn more about \"Body Mass Index: Care Instructions. \"  Current as of: December 27, 2021               Content Version: 13.2  © 2006-2022 Healthwise[employer]. Care instructions adapted under license by [employer] (which disclaims liability or warranty for this information). If you have questions about a medical condition or this instruction, always ask your healthcare professional. [place] [address] any warranty or liability for your use of this information. Medicare Wellness Visit, Female     The best way to live healthy is to have a lifestyle where you eat a well-balanced diet, exercise regularly, limit alcohol use, and quit all forms of tobacco/nicotine, if applicable.      Regular preventive services are another way to keep healthy. Preventive services (vaccines, screening tests, monitoring & exams) can help personalize your care plan, which helps you manage your own care. Screening tests can find health problems at the earliest stages, when they are easiest to treat. [facility] follows the current, evidence-based guidelines published by the [facility] ([facility]) when recommending preventive services for our patients. Because we follow these guidelines, sometimes recommendations change over time as research supports it. (For example, mammograms used to be recommended annually. Even though Medicare will still pay for an annual mammogram, the newer guidelines recommend a mammogram every two years for women of average risk). Of course, you and your doctor may decide to screen more often for some diseases, based on your risk and your co-morbidities (chronic disease you are already diagnosed with). Preventive services for you include:  - Medicare offers their members a free annual wellness visit, which is time for you and your primary care provider to discuss and plan for your preventive service needs. Take advantage of this benefit every year!  -All adults over the age of 72 should receive the recommended pneumonia vaccines. Current USPSTF guidelines recommend a series of two vaccines for the best pneumonia protection.   -All adults should have a flu vaccine yearly and a tetanus vaccine every 10 years.   -All adults age 48 and older should receive the shingles vaccines (series of two vaccines).       -All adults age 38-68 who are overweight should have a diabetes screening test once every three years.   -All adults born between 80 and 1965 should be screened once for Hepatitis C.  -Other screening tests and preventive services for persons with diabetes include: an eye exam to screen for diabetic retinopathy, a kidney function test, a foot exam, and stricter control over your cholesterol.   -Cardiovascular screening for adults with routine risk involves an electrocardiogram (ECG) at intervals determined by your doctor.   -Colorectal cancer screenings should be done for adults age 54-65 with no increased risk factors for colorectal cancer. There are a number of acceptable methods of screening for this type of cancer. Each test has its own benefits and drawbacks. Discuss with your doctor what is most appropriate for you during your annual wellness visit. The different tests include: colonoscopy (considered the best screening method), a fecal occult blood test, a fecal DNA test, and sigmoidoscopy.    -A bone mass density test is recommended when a woman turns 65 to screen for osteoporosis. This test is only recommended one time, as a screening. Some providers will use this same test as a disease monitoring tool if you already have osteoporosis. -Breast cancer screenings are recommended every other year for women of normal risk, age 54-69.  -Cervical cancer screenings for women over age 72 are only recommended with certain risk factors. Here is a list of your current Health Maintenance items (your personalized list of preventive services) with a due date:  Health Maintenance Due   Topic Date Due    Hepatitis C Test  Never done    Shingles Vaccine (1 of 2) Never done    Smoker or Former Smoker - [address]  Never done    Cholesterol Test   05/28/2022         Medicare Wellness Visit, Female     The best way to live healthy is to have a lifestyle where you eat a well-balanced diet, exercise regularly, limit alcohol use, and quit all forms of tobacco/nicotine, if applicable. Regular preventive services are another way to keep healthy. Preventive services (vaccines, screening tests, monitoring & exams) can help personalize your care plan, which helps you manage your own care.  Screening tests can find health problems at the earliest stages, when they are easiest to treat. [facility] follows the current, evidence-based guidelines published by the [facility] ([facility]) when recommending preventive services for our patients. Because we follow these guidelines, sometimes recommendations change over time as research supports it. (For example, mammograms used to be recommended annually. Even though Medicare will still pay for an annual mammogram, the newer guidelines recommend a mammogram every two years for women of average risk). Of course, you and your doctor may decide to screen more often for some diseases, based on your risk and your co-morbidities (chronic disease you are already diagnosed with). Preventive services for you include:  - Medicare offers their members a free annual wellness visit, which is time for you and your primary care provider to discuss and plan for your preventive service needs. Take advantage of this benefit every year!  -All adults over the age of 72 should receive the recommended pneumonia vaccines. Current USPSTF guidelines recommend a series of two vaccines for the best pneumonia protection.   -All adults should have a flu vaccine yearly and a tetanus vaccine every 10 years.   -All adults age 48 and older should receive the shingles vaccines (series of two vaccines).       -All adults age 38-68 who are overweight should have a diabetes screening test once every three years.   -All adults born between 80 and 1965 should be screened once for Hepatitis C.  -Other screening tests and preventive services for persons with diabetes include: an eye exam to screen for diabetic retinopathy, a kidney function test, a foot exam, and stricter control over your cholesterol.   -Cardiovascular screening for adults with routine risk involves an electrocardiogram (ECG) at intervals determined by your doctor.   -Colorectal cancer screenings should be done for adults age 54-65 with no increased risk factors for colorectal cancer. There are a number of acceptable methods of screening for this type of cancer. Each test has its own benefits and drawbacks. Discuss with your doctor what is most appropriate for you during your annual wellness visit. The different tests include: colonoscopy (considered the best screening method), a fecal occult blood test, a fecal DNA test, and sigmoidoscopy.    -A bone mass density test is recommended when a woman turns 65 to screen for osteoporosis. This test is only recommended one time, as a screening. Some providers will use this same test as a disease monitoring tool if you already have osteoporosis. -Breast cancer screenings are recommended every other year for women of normal risk, age 54-69.  -Cervical cancer screenings for women over age 72 are only recommended with certain risk factors.      Here is a list of your current Health Maintenance items (your personalized list of preventive services) with a due date:  Health Maintenance Due   Topic Date Due    Hepatitis C Test  Never done    Shingles Vaccine (1 of 2) Never done    Smoker or Former Smoker - [address]  Never done

## 2022-06-09 NOTE — PROGRESS NOTES
Manuel Baig presents today for   Chief Complaint   Patient presents with    Follow-up    Annual Wellness Visit    Medication Refill    Labs     6-1-22       1. \"Have you been to the ER, urgent care clinic since your last visit? Hospitalized since your last visit? \" no    2. \"Have you seen or consulted any other health care providers outside of the [address] [place] [address] since your last visit? \" yes     3. For patients aged 39-70: Has the patient had a colonoscopy / FIT/ Cologuard? NA - based on age      If the patient is female:    4. For patients aged 41-77: Has the patient had a mammogram within the past 2 years? NA - based on age or sex  See top three    5. For patients aged 21-65: Has the patient had a pap smear?  NA - based on age or sex

## 2022-06-10 LAB
ALBUMIN SERPL-MCNC: 4.3 G/DL (ref 3.7–4.7)
ALBUMIN/GLOB SERPL: 1.7 {RATIO} (ref 1.2–2.2)
ALP SERPL-CCNC: 91 IU/L (ref 44–121)
ALT SERPL-CCNC: 11 IU/L (ref 0–32)
AST SERPL-CCNC: 18 IU/L (ref 0–40)
BACTERIA UR CULT: NORMAL
BASOPHILS # BLD AUTO: 0 X10E3/UL (ref 0–0.2)
BASOPHILS NFR BLD AUTO: 1 %
BILIRUB SERPL-MCNC: 0.3 MG/DL (ref 0–1.2)
BUN SERPL-MCNC: 12 MG/DL (ref 8–27)
BUN/CREAT SERPL: 17 (ref 12–28)
CALCIUM SERPL-MCNC: 9.3 MG/DL (ref 8.7–10.3)
CHLORIDE SERPL-SCNC: 101 MMOL/L (ref 96–106)
CHOLEST SERPL-MCNC: 174 MG/DL (ref 100–199)
CO2 SERPL-SCNC: 25 MMOL/L (ref 20–29)
CREAT SERPL-MCNC: 0.72 MG/DL (ref 0.57–1)
EGFR: 86 ML/MIN/1.73
EOSINOPHIL # BLD AUTO: 0.1 X10E3/UL (ref 0–0.4)
EOSINOPHIL NFR BLD AUTO: 2 %
ERYTHROCYTE [DISTWIDTH] IN BLOOD BY AUTOMATED COUNT: 12.7 % (ref 11.7–15.4)
GLOBULIN SER CALC-MCNC: 2.5 G/DL (ref 1.5–4.5)
GLUCOSE SERPL-MCNC: 95 MG/DL (ref 65–99)
HCT VFR BLD AUTO: 40.6 % (ref 34–46.6)
HDLC SERPL-MCNC: 65 MG/DL
HGB BLD-MCNC: 13.5 G/DL (ref 11.1–15.9)
IMM GRANULOCYTES # BLD AUTO: 0 X10E3/UL (ref 0–0.1)
IMM GRANULOCYTES NFR BLD AUTO: 0 %
IMP & REVIEW OF LAB RESULTS: NORMAL
LDLC SERPL CALC-MCNC: 96 MG/DL (ref 0–99)
LYMPHOCYTES # BLD AUTO: 2.1 X10E3/UL (ref 0.7–3.1)
LYMPHOCYTES NFR BLD AUTO: 36 %
MCH RBC QN AUTO: 31.4 PG (ref 26.6–33)
MCHC RBC AUTO-ENTMCNC: 33.3 G/DL (ref 31.5–35.7)
MCV RBC AUTO: 94 FL (ref 79–97)
MONOCYTES # BLD AUTO: 0.5 X10E3/UL (ref 0.1–0.9)
MONOCYTES NFR BLD AUTO: 8 %
NEUTROPHILS # BLD AUTO: 3 X10E3/UL (ref 1.4–7)
NEUTROPHILS NFR BLD AUTO: 53 %
PLATELET # BLD AUTO: 186 X10E3/UL (ref 150–450)
POTASSIUM SERPL-SCNC: 4.4 MMOL/L (ref 3.5–5.2)
PROT SERPL-MCNC: 6.8 G/DL (ref 6–8.5)
RBC # BLD AUTO: 4.3 X10E6/UL (ref 3.77–5.28)
SODIUM SERPL-SCNC: 139 MMOL/L (ref 134–144)
T4 FREE SERPL-MCNC: 1.52 NG/DL (ref 0.82–1.77)
TRIGL SERPL-MCNC: 67 MG/DL (ref 0–149)
TSH SERPL DL<=0.005 MIU/L-ACNC: 1.01 UIU/ML (ref 0.45–4.5)
VLDLC SERPL CALC-MCNC: 13 MG/DL (ref 5–40)
WBC # BLD AUTO: 5.8 X10E3/UL (ref 3.4–10.8)

## 2022-06-19 NOTE — ACP (ADVANCE CARE PLANNING)
Advance Care Planning  Advance Care Planning (ACP) Provider Conversation     Date of ACP Conversation: 6/9/22  Persons included in Conversation:  patient    Authorized Decision Maker (if patient is incapable of making informed decisions): This person is:   Named in Advance Directive or Healthcare Power of           For Patients with Decision Making Capacity:   Values/Goals: Exploration of values, goals, and preferences if recovery is not expected, even with continued medical treatment in the event of:  Imminent death  Severe, permanent brain injury    Conversation Outcomes / Follow-Up Plan:   ACP complete - no further action today. She has no plans to change her advance directive at this time.     Dr. Rohit Machado  [facility] [address], [address] [place] [address].  Phone: [phone]  Fax: [phone] Samson Depute    1976    Primary Care Provider: Monserrat Calvert APRN    Chief Complaint   Patient presents with   • Post-op Follow-up     laparoscopic appendectomy.   Atelectasis    SUBJECTIVE:    History of present illness: I did see the patient today in the office 5 days status post laparoscopic appendectomy with a large amount of intraoperative irrigation for treatment of acute appendicitis with early rupture.  The patient did undergo this surgical intervention 5 days ago last Sunday and did fairly well, unbeknownst to me he was brought back into the emergency room this past Tuesday by his family.  At that time his laboratory values were normal but chest x-ray showed evidence of a possible left lower lobe atelectasis versus pneumonia and he was treated with IV Rocephin.  He subsequently was returned to the emergency room earlier today and CT scan of the abdomen and pelvis did show inflammatory changes in the right quadrant with a small 3 cm fluid collection that was not felt to be an abscess.  The wife was not very happy that he was allowed to go home from the emergency room and I was subsequently contacted.  I then saw the patient and the wife in the office.    The patient's wife and the patient state that he has had fair oral intake this week and is moving his bowels.  They did complain of intermittent fever up to 101 but all temperatures in the emergency room were normal.  He does complain of right lower quadrant and suprapubic abdominal discomfort.    I should note that the patient did have cultures taken intraoperatively and these did grow out E. coli that was susceptible to all antibiotics.  He was taking oral Augmentin at home.    He does have a history significant for neurocardiogenic syncope and does have a heart monitor in place and is under the care of Dr. Roach of the cardiology service.    Review of Systems:  Constitutional:  Negative for chills and unexpected weight change.    Questionable history of fever at home   HENT: Negative for trouble swallowing and voice change.  Eyes:  Negative for visual disturbance.  Respiratory:  Negative for apnea, cough, chest tightness, shortness of breath, and wheezing.  Cardiovascular:  Negative for chest pain, palpitations, and leg swelling.  Gastrointestinal:  Negative for abdominal distention,  anal bleeding, blood in stool, constipation, diarrhea, nausea, rectal pain, and vomiting.  There is a history significant for lower quadrant abdominal discomfort  Musculoskeletal:  Negative for back pain, gait problem, and joint swelling.  Skin:  Negative for color change, rash, and wound  Neurological:  Negative for dizziness, syncope, speech difficulty, weakness, numbness, and headaches.  Hematological:  Negative for adenopathy.  Does not bruise/bleed easily.  Psychiatric/Behavioral:  Negative for confusion.  The patient is not nervous/anxious.        History:    Past Medical History:   Diagnosis Date   • Acid reflux    • Anesthesia complication     slow to wake   • Arthritis    • Holter monitor, abnormal    • Neurocardiogenic syncope        Past Surgical History:   Procedure Laterality Date   • APPENDECTOMY N/A 8/4/2019    Procedure: APPENDECTOMY LAPAROSCOPIC;  Surgeon: Nikhil Sherman MD;  Location: [facility];  Service: General   • HIP SURGERY Right 05/2014   • WRIST SURGERY  2008 and 2010       Family History   Problem Relation Age of Onset   • Arthritis Mother    • Osteoporosis Mother    • Obesity Mother    • Diabetes Maternal Uncle    • Migraines Maternal Uncle    • Cancer Maternal Grandmother    • Heart attack Maternal Grandfather    • Hyperlipidemia Maternal Grandfather    • Hypertension Maternal Grandfather        Social History     Socioeconomic History   • Marital status:      Spouse name: Not on file   • Number of children: Not on file   • Years of education: Not on file   • Highest education level: Not on file   Tobacco Use   • Smoking  "status: Former Smoker     Types: Cigarettes     Last attempt to quit: 2013     Years since quittin.0   • Smokeless tobacco: Never Used   Substance and Sexual Activity   • Alcohol use: No   • Drug use: No   • Sexual activity: Defer       Allergies:  No Known Allergies    Medications:    Current Outpatient Medications:   •  docusate sodium (COLACE) 100 MG capsule, Take 1 capsule by mouth 2 (Two) Times a Day., Disp: 14 capsule, Rfl: 0  •  ondansetron (ZOFRAN) 4 MG tablet, Take 1 tablet by mouth Every 8 (Eight) Hours As Needed for Nausea or Vomiting., Disp: 20 tablet, Rfl: 0  •  oxyCODONE-acetaminophen (PERCOCET) 5-325 MG per tablet, Take 1 tablet by mouth Every 6 (Six) Hours As Needed for Moderate Pain ., Disp: 16 tablet, Rfl: 0  •  Elastic Bandages & Supports (JOBST ACTIVE 20-30MMHG MEDIUM) misc, 1 application Daily. Remove at night., Disp: 2 each, Rfl: 2  No current facility-administered medications for this visit.     OBJECTIVE:    Vital Signs:   Vitals:    19 1548   BP: 128/86   Pulse: 92   Resp: 22   Temp: 98.4 °F (36.9 °C)   TempSrc: Temporal   SpO2: 99%   Weight: 111 kg (244 lb)   Height: 190.5 cm (75\")       Physical Exam:   General Appearance:    Alert, cooperative, in no acute distress   Head:    Normocephalic, without obvious abnormality, atraumatic   Eyes:            Lids and lashes normal, conjunctivae and sclerae normal, no   icterus, no pallor, corneas clear, PERRLA   Throat:   No oral lesions, no thrush, oral mucosa moist   Neck:   No adenopathy, supple, trachea midline, no thyromegaly, no   carotid bruit, no JVD   Lungs:     Clear to auscultation,respirations regular, even and                  unlabored    Heart:    Regular rhythm and normal rate, normal S1 and S2, no            murmur, no gallop, no rub, no click   Chest Wall:    No abnormalities observed   Abdomen:     Normal bowel sounds, no masses, no organomegaly, soft        slightly tender in the lower quadrants bilaterally, " non-distended, no guarding, no rebound                tenderness   Extremities:   Moves all extremities well, no edema, no cyanosis, no             redness   Pulses:   Pulses palpable and equal bilaterally   Skin:   No bleeding, bruising or rash   Lymph nodes:   No palpable adenopathy   Neurologic:   Cranial nerves 2 - 12 grossly intact, sensation intact, DTR       present and equal bilaterally   Results Review:   I reviewed the patient's new clinical results.  I reviewed the patient's new imaging results and agree with the interpretation.  I reviewed the patient's other test results and agree with the interpretation     I reviewed his old ER records, labs, CT scan, previous CXR, etc    ASSESSMENT PLAN:    1. Ileus (CMS/HCC)    2. Syncope, unspecified syncope type    3. Atelectasis        I have had a detailed discussion with the emergency room physician today.  I have also discussed the situation at length with the patient and his wife.  Clinically he looks fairly good but he does appear to not feel very well, I wonder if he does not need more IV fluid rehydration and would benefit from IV antibiotics and overnight hospitalization.  I do not think the patient has an intra-abdominal abscess, I did review the CT scan today with the radiologist.  I have reviewed his previous chest x-ray that showed evidence of atelectasis.    The patient and the wife had no questions for me at the end of the office visit, I have discussed the situation over the phone with the hospitalist service and they will see the patient with me in consultation for management of his medical issues.  The patient and the wife fully understand that he had gangrenous appendicitis on Sunday and this was successfully intervened upon laparoscopically, he has had some issues this week but this is not terribly surprising and hopefully with the above-mentioned hospitalization and IV antibiotics we can get him over this episode and feeling better.    I  discussed the patients findings and my recommendations with patient, family and consulting provider.    Nikhil Sherman MD  08/09/19  4:23 PM

## 2022-06-19 NOTE — PROGRESS NOTES
INTERNISTS OF [facility]:  22, [id number]      The Subsequent Medicare Annual Wellness Exam PROGRESS NOTE    This is a Subsequent Medicare Annual Wellness Exam (AWV). I have reviewed the patient's medical history in detail and updated the computerized patient record. Vlad King is a 67 y.o.  female and presents for an annual wellness exam.    SUBJECTIVE  Past Medical History:   Diagnosis Date    Acid reflux     COPD, mild (HCC)     High cholesterol     Hypothyroidism     Joint pain     Joint swelling     Lichen planus     Sinus problem     Urinary frequency       Past Surgical History:   Procedure Laterality Date    HX KNEE REPLACEMENT Bilateral     left , right     HX PARTIAL HYSTERECTOMY  1967     Current Outpatient Medications   Medication Sig Dispense Refill    clobetasoL (TEMOVATE) 0.05 % topical gel Apply  to affected area as needed (mouth sores). 15 g 3    simvastatin (ZOCOR) 20 mg tablet Take 1 tablet by mouth nightly 90 Tablet 3    Euthyrox 112 mcg tablet TAKE 1 TABLET BY MOUTH ONCE DAILY BEFORE BREAKFAST 90 Tablet 3    tiotropium (SPIRIVA) 18 mcg inhalation capsule Take 1 Capsule by inhalation daily. 30 Capsule 11    cholecalciferol, vitamin D3, (VITAMIN D3) 2,000 unit tab Take 2,000 Units by mouth daily.  aspirin delayed-release 81 mg tablet Take 81 mg by mouth daily.  multivitamin (ONE A DAY) tablet Take 1 Tab by mouth daily.        Allergies   Allergen Reactions    Penicillins Rash     Family History   Problem Relation Age of Onset    Heart Disease Mother     Hypertension Mother     No Known Problems Father     Breast Cancer Paternal Aunt     Diabetes Maternal Grandmother     Cancer Brother         pancreatic cancer     Social History     Tobacco Use    Smoking status: Former Smoker     Packs/day: 0.50     Years: 40.00     Pack years: 20.00     Types: Cigarettes     Quit date: 2009     Years since quittin.1    Smokeless tobacco: Never Used   Substance Use Topics    Alcohol use: Not Currently     Patient Active Problem List   Diagnosis Code    Hypothyroidism E03.9    High cholesterol E78.00    COPD, mild (HCC) J44.9    Acid reflux C50.2    Lichen planus E42.5    Severe obesity ([facility] [address] E66.01    Lumbar disc disease M51.9    Vitamin D deficiency E55.9    Osteoporosis M81.0       The pt is a 67yo female with h/o obesity, hypothyroidism, HLD, COPD?, GERD, lumbar disc disease, vitamin D deficiency, and lichen planus.     Health Maintenance History  Immunizations reviewed: Tdap over-due   Pneumovax: over-due   Flu: flu season is over  Zoster: over-due    Immunization History   Administered Date(s) Administered    COVID-19, Pfizer Purple top, DILUTE for use, 12+ yrs, 30mcg/0.3mL dose 03/06/2021, 03/27/2021, 10/05/2021    Influenza Vaccine (Tri) Adjuvanted (>65 Yrs FLUAD TRI [id number]) 10/25/2019    Influenza, Quadrivalent, Adjuvanted (>65 Yrs FLUAD QUAD [id number]) 10/14/2020       Colonoscopy: Up to date. No bleeding    Eye exam: Up to date. Mammo: Up to date    Dexascan: Overdue    Pelvic/Pap: No bleeding. Review of Systems   Constitutional: Negative for chills and fever. HENT: Negative for ear pain and sore throat. Eyes: Negative for blurred vision and pain. Respiratory: Negative for cough and shortness of breath. Cardiovascular: Negative for chest pain. Gastrointestinal: Negative for abdominal pain, blood in stool and melena. Genitourinary: Negative for dysuria and hematuria. Musculoskeletal: Negative for joint pain and myalgias. Skin: Negative for rash. Neurological: Negative for headaches. Endo/Heme/Allergies: Does not bruise/bleed easily. Psychiatric/Behavioral: Negative for depression and substance abuse. Depression Risk Factor Screening:      Patient Health Questionnaire (PHQ-2)   Over the last 2 weeks, how often have you been bothered by any of the following problems?   · Little interest or pleasure in doing things? · Not at all. [0]  · Feeling down, depressed, or hopeless? · Not at all. [0]    Total Score: 0/6  PHQ-2 Assessment Scoring:   A score of 2 or more requires further screening with the PHQ-9    Alcohol Risk Factor Screening:   Women: On any occasion during the past 3 months, have you had more than 3 drinks containing alcohol? no    Do you average more than 7 drinks per week? no    Tobacco Use Screening:     Social History     Tobacco Use   Smoking Status Former Smoker    Packs/day: 0.50    Years: 40.00    Pack years: 20.00    Types: Cigarettes    Quit date: 2009    Years since quittin.1   Smokeless Tobacco Never Used       Hearing Loss   There have been no changes to the pt's hearing. No additional studies/evaluation is warranted at this time    Activities of Daily Living   Self-care. Requires assistance with: no ADLs  She does not need help with ADLs/IADLs    Fall Risk   No fall risk factors. No falls w/I the past year. Abuse Screen   None    Additional Examination Findings:  Vitals:    22 0918   BP: 109/60   Pulse: 91   Resp: 12   Temp: 98.1 °F (36.7 °C)   TempSrc: Temporal   SpO2: 97%   Weight: 212 lb (96.2 kg)   Height: 5' 6.25\" (1.683 m)   PainSc:   0 - No pain      Body mass index is 33.96 kg/m². Evaluation of Cognitive Function:  Mood/affect: Euthymic  Appearance: Well-groomed  Family member/caregiver input: She is not with a family member today      General:   Well-nourished, well-groomed, pleasant, alert, in no acute distress.      Head:  Normocephalic, atraumatic  Ears:  External ears WNL  Eyes:  Clear sclera  Neuro:   Alert, conversant, appropriate, following commands, no sensory deficit   Skin:    No rashes noted  Psych: Affect, mood and judgment appropriate      Dementia Screen:  Clock Drawing (ten past eleven) Exercise: Unremarkable      LABS   Data Review:   Lab Results   Component Value Date/Time    Sodium 139 2022 07:58 AM Potassium 4.4 06/01/2022 07:58 AM    Chloride 101 06/01/2022 07:58 AM    CO2 25 06/01/2022 07:58 AM    Glucose 95 06/01/2022 07:58 AM    BUN 12 06/01/2022 07:58 AM    Creatinine 0.72 06/01/2022 07:58 AM    BUN/Creatinine ratio 17 06/01/2022 07:58 AM    GFR est AA 98 05/28/2021 09:30 AM    GFR est non-AA 85 05/28/2021 09:30 AM    Calcium 9.3 06/01/2022 07:58 AM       Lab Results   Component Value Date/Time    WBC 5.8 06/01/2022 07:58 AM    HGB 13.5 06/01/2022 07:58 AM    HCT 40.6 06/01/2022 07:58 AM    PLATELET 755 05/71/0856 07:58 AM    MCV 94 06/01/2022 07:58 AM       Lab Results   Component Value Date/Time    Hemoglobin A1c 5.3 04/23/2019 10:30 AM       Lab Results   Component Value Date/Time    Cholesterol, total 174 06/01/2022 07:58 AM    HDL Cholesterol 65 06/01/2022 07:58 AM    LDL, calculated 96 06/01/2022 07:58 AM    LDL, calculated 82 03/06/2020 09:58 AM    VLDL, calculated 13 06/01/2022 07:58 AM    VLDL, calculated 16 03/06/2020 09:58 AM    Triglyceride 67 06/01/2022 07:58 AM         Patient Care Team:  Rj Pedraza MD as PCP - General (Family Medicine)  Rj Pedraza MD as PCP - [facility] Empaneled Provider    End-of-life planning  Advanced Directive in the case than an injury or illness causes the patient to be unable to make health care decisions was discussed with the patient.      Advice/Referrals/Counselling/Plan:   Education and counseling provided:  Are appropriate based on today's review and evaluation  End-of-Life planning (with patient's consent)  Pneumococcal Vaccine  Influenza Vaccine  Screening Mammography  Screening Pap and pelvic (covered once every 2 years)  Colorectal cancer screening tests  Cardiovascular screening blood test  Bone mass measurement (DEXA)  Screening for glaucoma  Diabetes screening test  Include in education list (weight loss, physical activity, smoking cessation, fall prevention, and nutrition)    [id number]    1. Lichen planus  Z29.8 697.0 clobetasoL (TEMOVATE) 0.05 % topical gel   2. Rash  R21 782.1      reviewed diet, exercise and weight control. Brief written plan, checklist    Assessment/Plan:    Health Maintenance:  - I encouraged her to get all recommended vaccinations/screenings. Lab review: labs are reviewed in the EHR and ordered as mentioned       Advance Care Planning  Advance Care Planning (ACP) Provider Conversation     Date of ACP Conversation: 6/9/22  Persons included in Conversation:  patient    Authorized Decision Maker (if patient is incapable of making informed decisions): This person is:   Named in Advance Directive or Healthcare Power of           For Patients with Decision Making Capacity:   Values/Goals: Exploration of values, goals, and preferences if recovery is not expected, even with continued medical treatment in the event of:  Imminent death  Severe, permanent brain injury    Conversation Outcomes / Follow-Up Plan:   ACP complete - no further action today. She has no plans to change her advance directive at this time. I have discussed the diagnosis with the patient and the intended plan as seen in the above orders. The patient has received an after-visit summary and questions were answered concerning future plans. I have discussed medication side effects and warnings with the patient as well. I have reviewed the plan of care with the patient, accepted their input and they are in agreement with the treatment goals. Follow-up and Dispositions    · Return in about 1 year (around 6/9/2023).

## 2023-01-05 ENCOUNTER — PATIENT MESSAGE (OUTPATIENT)
Dept: INTERNAL MEDICINE CLINIC | Age: 78
End: 2023-01-05

## 2023-01-13 NOTE — TELEPHONE ENCOUNTER
Appt scheduled for 1/31/2023.  Pt says she has a bad cold right now so she wanted to schedule out a little

## 2023-01-31 ENCOUNTER — OFFICE VISIT (OUTPATIENT)
Dept: INTERNAL MEDICINE CLINIC | Age: 78
End: 2023-01-31
Payer: MEDICARE

## 2023-01-31 VITALS
HEIGHT: 66 IN | SYSTOLIC BLOOD PRESSURE: 112 MMHG | BODY MASS INDEX: 36.16 KG/M2 | WEIGHT: 225 LBS | TEMPERATURE: 98.5 F | DIASTOLIC BLOOD PRESSURE: 61 MMHG | RESPIRATION RATE: 12 BRPM | OXYGEN SATURATION: 96 % | HEART RATE: 79 BPM

## 2023-01-31 DIAGNOSIS — M25.552 LEFT HIP PAIN: Primary | ICD-10-CM

## 2023-01-31 DIAGNOSIS — J44.9 COPD, MILD (HCC): ICD-10-CM

## 2023-01-31 DIAGNOSIS — E03.9 HYPOTHYROIDISM, UNSPECIFIED TYPE: ICD-10-CM

## 2023-01-31 DIAGNOSIS — Z13.220 SCREENING FOR HYPERLIPIDEMIA: ICD-10-CM

## 2023-01-31 DIAGNOSIS — E66.01 SEVERE OBESITY (BMI 35.0-39.9) WITH COMORBIDITY (HCC): ICD-10-CM

## 2023-01-31 DIAGNOSIS — M25.511 ACUTE PAIN OF RIGHT SHOULDER: ICD-10-CM

## 2023-01-31 PROCEDURE — 1123F ACP DISCUSS/DSCN MKR DOCD: CPT | Performed by: INTERNAL MEDICINE

## 2023-01-31 PROCEDURE — G8417 CALC BMI ABV UP PARAM F/U: HCPCS | Performed by: INTERNAL MEDICINE

## 2023-01-31 PROCEDURE — G8432 DEP SCR NOT DOC, RNG: HCPCS | Performed by: INTERNAL MEDICINE

## 2023-01-31 PROCEDURE — 99214 OFFICE O/P EST MOD 30 MIN: CPT | Performed by: INTERNAL MEDICINE

## 2023-01-31 PROCEDURE — 1101F PT FALLS ASSESS-DOCD LE1/YR: CPT | Performed by: INTERNAL MEDICINE

## 2023-01-31 PROCEDURE — 1090F PRES/ABSN URINE INCON ASSESS: CPT | Performed by: INTERNAL MEDICINE

## 2023-01-31 PROCEDURE — G8427 DOCREV CUR MEDS BY ELIG CLIN: HCPCS | Performed by: INTERNAL MEDICINE

## 2023-01-31 PROCEDURE — G8536 NO DOC ELDER MAL SCRN: HCPCS | Performed by: INTERNAL MEDICINE

## 2023-01-31 RX ORDER — CELECOXIB 200 MG/1
200 CAPSULE ORAL DAILY
Qty: 14 CAPSULE | Refills: 0 | Status: SHIPPED | OUTPATIENT
Start: 2023-01-31 | End: 2023-02-14

## 2023-01-31 NOTE — PROGRESS NOTES
David Griggs presents today for   Chief Complaint   Patient presents with    Hip Pain     Left hip x several months. Unable to sleep on the left side due to pain . Shoulder Pain     Right Shoulder pain x 3 days, after lifting a case of water. 1. \"Have you been to the ER, urgent care clinic since your last visit? Hospitalized since your last visit? \" no    2. \"Have you seen or consulted any other health care providers outside of the [address] [place] [address] since your last visit? \" yes     3. For patients aged 39-70: Has the patient had a colonoscopy / FIT/ Cologuard? NA - based on age      If the patient is female:    4. For patients aged 41-77: Has the patient had a mammogram within the past 2 years? NA - based on age or sex  See top three    5. For patients aged 21-65: Has the patient had a pap smear?  NA - based on age or sex

## 2023-01-31 NOTE — PROGRESS NOTES
INTERNISTS OF [facility]:  1/31/2023, MRN: [id number]      Conrad Howard is a 65 y.o. female and presents to clinic for Hip Pain (Left hip x several months. Unable to sleep on the left side due to pain . ) and Shoulder Pain (Right Shoulder pain x 3 days, after lifting a case of water. )      Subjective: The pt is a 67yo female with h/o obesity, hypothyroidism, HLD, COPD?, GERD, lumbar disc disease, vitamin D deficiency, and lichen planus. 1.  COPD: She is a former smoker. SOB/cough: She just got over a bad cold and has a small cough. Using Spiriva. Albuterol inhaler: she does not have this rx at home. She does not want albuterol as a rescue inhaler. 2. Left Hip Pain:  H/o trauma: none. Alleviating factors: none. Radiation of pain: none. She cannot lie down on her left side at night. Left Hip Xray 4/23/19: No evidence of acute fracture or dislocation of the left hip. Mild joint space narrowing at the hips, with small osteophyte at the inferomedial left femoral head. Bony pelvis and sacrum appear grossly intact. 3. Right Shoulder Pain: Occurred on Sunday. She declines PT. She did not hear a pop. She lies on her right shoulder 2/2 #2.     4. Obesity: BMI is 36. BP is stable. +Gaining weight. Incidentally, she has hypothyroidism and is on Synthroid. Blood pressure is 112/61. Other than weight gain, she denies symptoms of hypothyroidism or hyperthyroidism. Patient Active Problem List    Diagnosis Date Noted    Osteoporosis 05/19/2021    Lumbar disc disease 09/12/2019    Vitamin D deficiency 09/12/2019    Severe obesity ([facility] [address] 04/21/2019    Hypothyroidism     High cholesterol     COPD, mild (HCC)     Acid reflux     Lichen planus        Current Outpatient Medications   Medication Sig Dispense Refill    celecoxib (CeleBREX) 200 mg capsule Take 1 Capsule by mouth daily for 14 days.  14 Capsule 0    Spiriva with HandiHaler 18 mcg inhalation capsule Inhale 1 puff by mouth once daily 30 Capsule 5 simvastatin (ZOCOR) 20 mg tablet Take 1 tablet by mouth nightly 90 Tablet 2    Euthyrox 112 mcg tablet TAKE 1 TABLET BY MOUTH ONCE DAILY BEFORE BREAKFAST 90 Tablet 2    clobetasoL (TEMOVATE) 0.05 % topical gel Apply  to affected area as needed (mouth sores). 15 g 3    cholecalciferol, vitamin D3, 50 mcg (2,000 unit) tab Take 2,000 Units by mouth daily. aspirin delayed-release 81 mg tablet Take 81 mg by mouth daily. multivitamin (ONE A DAY) tablet Take 1 Tab by mouth daily. Allergies   Allergen Reactions    Penicillins Rash       Past Medical History:   Diagnosis Date    Acid reflux     Autoimmune disease (HCC)     COPD, mild (HCC)     High cholesterol     Hypothyroidism     Joint pain     Joint swelling     Lichen planus     Sinus problem     Urinary frequency        Past Surgical History:   Procedure Laterality Date    HX APPENDECTOMY      HX KNEE REPLACEMENT Bilateral     left , right 2016    HX PARTIAL HYSTERECTOMY  1967       Family History   Problem Relation Age of Onset    Heart Disease Mother     Hypertension Mother     No Known Problems Father     Breast Cancer Paternal Aunt     Diabetes Maternal Grandmother     Cancer Brother         pancreatic cancer       Social History     Tobacco Use    Smoking status: Former     Packs/day: 0.50     Years: 40.00     Pack years: 20.00     Types: Cigarettes     Quit date: 2009     Years since quittin.7    Smokeless tobacco: Never   Substance Use Topics    Alcohol use: Not Currently       ROS   Review of Systems   Constitutional:  Negative for chills and fever. HENT:  Negative for ear pain and sore throat. Eyes:  Negative for blurred vision and pain. Respiratory:  Negative for cough and shortness of breath. Cardiovascular:  Negative for chest pain. Gastrointestinal:  Negative for abdominal pain, blood in stool and melena. Genitourinary:  Negative for dysuria and hematuria. Musculoskeletal:  Positive for joint pain.  Negative for myalgias. Skin:  Negative for rash. Neurological:  Negative for headaches. Endo/Heme/Allergies:  Does not bruise/bleed easily. Psychiatric/Behavioral:  Negative for substance abuse. Objective     Vitals:    01/31/23 0926   BP: 112/61   Pulse: 79   Resp: 12   Temp: 98.5 °F (36.9 °C)   TempSrc: Temporal   SpO2: 96%   Weight: 225 lb (102.1 kg)   Height: 5' 6.25\" (1.683 m)   PainSc:   5   PainLoc: Generalized       Physical Exam  Vitals and nursing note reviewed. HENT:      Head: Normocephalic and atraumatic. Right Ear: External ear normal.      Left Ear: External ear normal.   Eyes:      General: No scleral icterus. Right eye: No discharge. Left eye: No discharge. Conjunctiva/sclera: Conjunctivae normal.   Cardiovascular:      Rate and Rhythm: Normal rate and regular rhythm. Heart sounds: Normal heart sounds. No murmur heard. No friction rub. No gallop. Pulmonary:      Effort: Pulmonary effort is normal. No respiratory distress. Breath sounds: Normal breath sounds. No wheezing or rales. Abdominal:      General: Bowel sounds are normal. There is no distension. Palpations: Abdomen is soft. There is no mass. Tenderness: There is no abdominal tenderness. There is no guarding or rebound. Musculoskeletal:         General: No swelling (BUE) or tenderness (BUE). Cervical back: Neck supple. Comments: She has some discomfort with range of motion exercises along her right shoulder but is able to complete range of motion exercises. She has full range of motion along her left hip. She has tenderness palpation along the left trochanteric bursa area. Lymphadenopathy:      Cervical: No cervical adenopathy. Skin:     General: Skin is warm and dry. Findings: No erythema or rash. Neurological:      Mental Status: She is alert. Motor: No abnormal muscle tone.       Gait: Gait normal.   Psychiatric:         Mood and Affect: Mood normal. LABS   Data Review:   Lab Results   Component Value Date/Time    WBC 5.8 06/01/2022 07:58 AM    HGB 13.5 06/01/2022 07:58 AM    HCT 40.6 06/01/2022 07:58 AM    PLATELET 139 33/27/1065 07:58 AM    MCV 94 06/01/2022 07:58 AM       Lab Results   Component Value Date/Time    Sodium 139 06/01/2022 07:58 AM    Potassium 4.4 06/01/2022 07:58 AM    Chloride 101 06/01/2022 07:58 AM    CO2 25 06/01/2022 07:58 AM    Glucose 95 06/01/2022 07:58 AM    BUN 12 06/01/2022 07:58 AM    Creatinine 0.72 06/01/2022 07:58 AM    BUN/Creatinine ratio 17 06/01/2022 07:58 AM    GFR est AA 98 05/28/2021 09:30 AM    GFR est non-AA 85 05/28/2021 09:30 AM    Calcium 9.3 06/01/2022 07:58 AM       Lab Results   Component Value Date/Time    Cholesterol, total 174 06/01/2022 07:58 AM    HDL Cholesterol 65 06/01/2022 07:58 AM    LDL, calculated 96 06/01/2022 07:58 AM    LDL, calculated 82 03/06/2020 09:58 AM    VLDL, calculated 13 06/01/2022 07:58 AM    VLDL, calculated 16 03/06/2020 09:58 AM    Triglyceride 67 06/01/2022 07:58 AM       Lab Results   Component Value Date/Time    Hemoglobin A1c 5.3 04/23/2019 10:30 AM       Assessment/Plan:   1. COPD, mild: Stable. -Continue with Spiriva. She declines a rescue inhaler, which was offered to her today (albuterol). 2. Left hip pain: Likely from trochanteric bursitis per PE findings.  -She declines physical therapy and seeing an orthopedist at this time.  - Ordering a 14-day course of Celebrex. She will notify me if her symptoms do not respond. - Activity as tolerated. ORDERS:  - celecoxib (CeleBREX) 200 mg capsule; Take 1 Capsule by mouth daily for 14 days. Dispense: 14 Capsule; Refill: 0    3. Severe obesity (BMI 35.0-39. 9) with comorbidity: BMI is 36.   -I encouraged her to reduce her weight by maintaining a heart healthy diet.  -We will get labs in the summer to assess her cholesterol    4.  Acute pain of right shoulder: Likely from a rotator cuff tendinopathy per PE findings.  -She declines seeing an orthopedist and doing physical therapy at this time.  -Incidentally, the course of Celebrex, mentioned above, will help relieve some of her symptoms from her right shoulder injury. - Activity as tolerated recommended. 5. General:  -I will order labs to be checked in summer to assess her thyroid and to screen for hyperlipidemia. Health Maintenance Due   Topic Date Due    Hepatitis C Screening  Never done    Pneumococcal 65+ years (1 - PCV) Never done    Shingles Vaccine (1 of 2) Never done    Low dose CT lung screening  Never done    Flu Vaccine (1) 08/01/2022    COVID-19 Vaccine (5 - Booster for Pfizer series) 12/07/2022       ICD-10-CM ICD-9-CM    1. Left hip pain  M25.552 719.45 celecoxib (CeleBREX) 200 mg capsule      2. COPD, mild (HCC)  J44.9 496 CBC WITH AUTOMATED DIFF      3. Severe obesity (BMI 35.0-39. 9) with comorbidity ([facility] [address]  E66.01 278.01 LIPID PANEL      4. Acute pain of right shoulder  M25.511 719.41       5. Hypothyroidism, unspecified type  S91.6 935.3 METABOLIC PANEL, COMPREHENSIVE      TSH AND FREE T4      CBC WITH AUTOMATED DIFF      6. Screening for hyperlipidemia  Z13.220 V77.91 LIPID PANEL            Lab review: labs are reviewed in the EHR and ordered as mentioned above    I have discussed the diagnosis with the patient and the intended plan as seen in the above orders. The patient has received an after-visit summary and questions were answered concerning future plans. I have discussed medication side effects and warnings with the patient as well. I have reviewed the plan of care with the patient, accepted their input and they are in agreement with the treatment goals. All questions were answered. The patient understands the plan of care. Handouts provided today with above information. Pt instructed if symptoms worsen to call the office or report to the ED for continued care. Greater than 50% of the visit time was spent in counseling and/or coordination of care. Voice recognition was used to generate this report, which may have resulted in some phonetic based errors in grammar and contents. Even though attempts were made to correct all the mistakes, some may have been missed, and remained in the body of the document. Follow-up and Dispositions    Return in about 5 months (around 6/30/2023).          Shay Cruz MD

## 2023-02-03 DIAGNOSIS — E03.9 HYPOTHYROIDISM, UNSPECIFIED TYPE: ICD-10-CM

## 2023-02-03 DIAGNOSIS — E78.00 HIGH CHOLESTEROL: Primary | ICD-10-CM

## 2023-02-05 DIAGNOSIS — E03.9 HYPOTHYROIDISM, UNSPECIFIED TYPE: ICD-10-CM

## 2023-02-05 DIAGNOSIS — J44.9 COPD, MILD (HCC): ICD-10-CM

## 2023-02-05 DIAGNOSIS — L43.9 LICHEN PLANUS: Primary | ICD-10-CM

## 2023-02-05 DIAGNOSIS — Z13.220 SCREENING FOR HYPERLIPIDEMIA: ICD-10-CM

## 2023-02-05 DIAGNOSIS — E66.01 SEVERE OBESITY (BMI 35.0-39.9) WITH COMORBIDITY (HCC): Primary | ICD-10-CM

## 2023-02-08 DIAGNOSIS — E78.00 HIGH CHOLESTEROL: ICD-10-CM

## 2023-02-08 DIAGNOSIS — E03.9 HYPOTHYROIDISM, UNSPECIFIED TYPE: ICD-10-CM

## 2023-02-08 RX ORDER — LEVOTHYROXINE SODIUM 112 UG/1
112 TABLET ORAL
Qty: 90 TABLET | Refills: 2 | Status: SHIPPED | OUTPATIENT
Start: 2023-02-08

## 2023-02-08 RX ORDER — SIMVASTATIN 20 MG/1
20 TABLET, FILM COATED ORAL
Qty: 90 TABLET | Refills: 2 | Status: SHIPPED | OUTPATIENT
Start: 2023-02-08

## 2023-02-08 NOTE — TELEPHONE ENCOUNTER
PCP: May Wynn MD    Last appt: 1/31/2023  No future appointments. Requested Prescriptions     Pending Prescriptions Disp Refills    levothyroxine (Euthyrox) 112 mcg tablet 90 Tablet 2     Sig: Take 1 Tablet by mouth Daily (before breakfast). simvastatin (ZOCOR) 20 mg tablet 90 Tablet 2     Sig: Take 1 Tablet by mouth nightly.

## 2023-02-17 ENCOUNTER — TELEPHONE (OUTPATIENT)
Age: 78
End: 2023-02-17

## 2023-02-17 RX ORDER — SIMVASTATIN 20 MG
TABLET ORAL
Qty: 90 TABLET | Refills: 2 | Status: SHIPPED | OUTPATIENT
Start: 2023-02-17

## 2023-02-17 RX ORDER — LEVOTHYROXINE SODIUM 112 UG/1
TABLET ORAL
Qty: 30 TABLET | Refills: 5 | Status: SHIPPED | OUTPATIENT
Start: 2023-02-17

## 2023-02-17 NOTE — PROGRESS NOTES
Zocor refilled.     Dr. Lorna Tena  [facility], [address] [facility], [address]  Phone: [phone]  Fax: [phone]

## 2023-02-17 NOTE — TELEPHONE ENCOUNTER
Synthroid refilled.     Dr. Catrachita Houser  [facility], [address] [facility], [address] [place] [address] [address]  Phone: [phone]  Fax: [phone]

## 2023-04-27 NOTE — PROGRESS NOTES
Esperanza Irene presents today for   Chief Complaint   Patient presents with    Follow-up    Cholesterol Problem    Labs     5-28-21    Medication Evaluation     Per patients ins need to change to spiriva            Coordination of Care:  1. Have you been to the ER, urgent care clinic since your last visit? Hospitalized since your last visit? yes    2. Have you seen or consulted any other health care providers outside of the [address] [place] [address] since your last visit? Include any pap smears or colon screening.  yes Initiate Treatment: Mometasone cream QD to axilla and post ears Render In Strict Bullet Format?: No Detail Level: Zone

## 2023-06-20 ENCOUNTER — NURSE ONLY (OUTPATIENT)
Age: 78
End: 2023-06-20

## 2023-06-20 DIAGNOSIS — J44.9 COPD, MILD (HCC): ICD-10-CM

## 2023-06-20 DIAGNOSIS — E78.00 HIGH CHOLESTEROL: ICD-10-CM

## 2023-06-20 DIAGNOSIS — E03.9 HYPOTHYROIDISM, UNSPECIFIED TYPE: ICD-10-CM

## 2023-06-20 DIAGNOSIS — Z13.220 SCREENING FOR HYPERLIPIDEMIA: ICD-10-CM

## 2023-06-20 DIAGNOSIS — L43.9 LICHEN PLANUS: ICD-10-CM

## 2023-06-20 DIAGNOSIS — E66.01 SEVERE OBESITY (BMI 35.0-39.9) WITH COMORBIDITY (HCC): ICD-10-CM

## 2023-06-21 LAB
ALBUMIN SERPL-MCNC: 4.3 G/DL (ref 3.7–4.7)
ALBUMIN/GLOB SERPL: 2 {RATIO} (ref 1.2–2.2)
ALP SERPL-CCNC: 92 IU/L (ref 44–121)
ALT SERPL-CCNC: 12 IU/L (ref 0–32)
AST SERPL-CCNC: 17 IU/L (ref 0–40)
BASOPHILS # BLD AUTO: 0 X10E3/UL (ref 0–0.2)
BASOPHILS NFR BLD AUTO: 1 %
BILIRUB SERPL-MCNC: 0.4 MG/DL (ref 0–1.2)
BUN SERPL-MCNC: 12 MG/DL (ref 8–27)
BUN/CREAT SERPL: 19 (ref 12–28)
CALCIUM SERPL-MCNC: 9.2 MG/DL (ref 8.7–10.3)
CHLORIDE SERPL-SCNC: 103 MMOL/L (ref 96–106)
CHOLEST SERPL-MCNC: 161 MG/DL (ref 100–199)
CO2 SERPL-SCNC: 26 MMOL/L (ref 20–29)
CREAT SERPL-MCNC: 0.63 MG/DL (ref 0.57–1)
EGFRCR SERPLBLD CKD-EPI 2021: 91 ML/MIN/1.73
EOSINOPHIL # BLD AUTO: 0.1 X10E3/UL (ref 0–0.4)
EOSINOPHIL NFR BLD AUTO: 3 %
ERYTHROCYTE [DISTWIDTH] IN BLOOD BY AUTOMATED COUNT: 12.7 % (ref 11.7–15.4)
GLOBULIN SER CALC-MCNC: 2.2 G/DL (ref 1.5–4.5)
GLUCOSE SERPL-MCNC: 105 MG/DL (ref 70–99)
HCT VFR BLD AUTO: 38.6 % (ref 34–46.6)
HDLC SERPL-MCNC: 64 MG/DL
HGB BLD-MCNC: 12.9 G/DL (ref 11.1–15.9)
IMM GRANULOCYTES # BLD AUTO: 0 X10E3/UL (ref 0–0.1)
IMM GRANULOCYTES NFR BLD AUTO: 0 %
LDLC SERPL CALC-MCNC: 80 MG/DL (ref 0–99)
LYMPHOCYTES # BLD AUTO: 1.5 X10E3/UL (ref 0.7–3.1)
LYMPHOCYTES NFR BLD AUTO: 26 %
MCH RBC QN AUTO: 31.1 PG (ref 26.6–33)
MCHC RBC AUTO-ENTMCNC: 33.4 G/DL (ref 31.5–35.7)
MCV RBC AUTO: 93 FL (ref 79–97)
MONOCYTES # BLD AUTO: 0.5 X10E3/UL (ref 0.1–0.9)
MONOCYTES NFR BLD AUTO: 8 %
NEUTROPHILS # BLD AUTO: 3.5 X10E3/UL (ref 1.4–7)
NEUTROPHILS NFR BLD AUTO: 62 %
PLATELET # BLD AUTO: 213 X10E3/UL (ref 150–450)
POTASSIUM SERPL-SCNC: 4.5 MMOL/L (ref 3.5–5.2)
PROT SERPL-MCNC: 6.5 G/DL (ref 6–8.5)
RBC # BLD AUTO: 4.15 X10E6/UL (ref 3.77–5.28)
SODIUM SERPL-SCNC: 140 MMOL/L (ref 134–144)
SPECIMEN STATUS REPORT: NORMAL
TRIGL SERPL-MCNC: 95 MG/DL (ref 0–149)
VLDLC SERPL CALC-MCNC: 17 MG/DL (ref 5–40)
WBC # BLD AUTO: 5.6 X10E3/UL (ref 3.4–10.8)

## 2023-06-24 SDOH — ECONOMIC STABILITY: INCOME INSECURITY: HOW HARD IS IT FOR YOU TO PAY FOR THE VERY BASICS LIKE FOOD, HOUSING, MEDICAL CARE, AND HEATING?: NOT HARD AT ALL

## 2023-06-24 SDOH — ECONOMIC STABILITY: FOOD INSECURITY: WITHIN THE PAST 12 MONTHS, YOU WORRIED THAT YOUR FOOD WOULD RUN OUT BEFORE YOU GOT MONEY TO BUY MORE.: NEVER TRUE

## 2023-06-24 SDOH — ECONOMIC STABILITY: TRANSPORTATION INSECURITY
IN THE PAST 12 MONTHS, HAS LACK OF TRANSPORTATION KEPT YOU FROM MEETINGS, WORK, OR FROM GETTING THINGS NEEDED FOR DAILY LIVING?: NO

## 2023-06-24 SDOH — ECONOMIC STABILITY: FOOD INSECURITY: WITHIN THE PAST 12 MONTHS, THE FOOD YOU BOUGHT JUST DIDN'T LAST AND YOU DIDN'T HAVE MONEY TO GET MORE.: NEVER TRUE

## 2023-06-24 SDOH — ECONOMIC STABILITY: HOUSING INSECURITY
IN THE LAST 12 MONTHS, WAS THERE A TIME WHEN YOU DID NOT HAVE A STEADY PLACE TO SLEEP OR SLEPT IN A SHELTER (INCLUDING NOW)?: NO

## 2023-06-27 ENCOUNTER — TELEPHONE (OUTPATIENT)
Age: 78
End: 2023-06-27

## 2023-06-27 ENCOUNTER — OFFICE VISIT (OUTPATIENT)
Age: 78
End: 2023-06-27
Payer: MEDICARE

## 2023-06-27 VITALS
SYSTOLIC BLOOD PRESSURE: 127 MMHG | WEIGHT: 229 LBS | DIASTOLIC BLOOD PRESSURE: 49 MMHG | HEART RATE: 95 BPM | HEIGHT: 66 IN | OXYGEN SATURATION: 96 % | TEMPERATURE: 97.9 F | BODY MASS INDEX: 36.8 KG/M2

## 2023-06-27 DIAGNOSIS — Z13.220 SCREENING FOR HYPERLIPIDEMIA: ICD-10-CM

## 2023-06-27 DIAGNOSIS — Z11.59 NEED FOR HEPATITIS C SCREENING TEST: ICD-10-CM

## 2023-06-27 DIAGNOSIS — R73.9 HYPERGLYCEMIA: ICD-10-CM

## 2023-06-27 DIAGNOSIS — E78.5 HYPERLIPIDEMIA, UNSPECIFIED HYPERLIPIDEMIA TYPE: ICD-10-CM

## 2023-06-27 DIAGNOSIS — M25.552 LEFT HIP PAIN: ICD-10-CM

## 2023-06-27 DIAGNOSIS — J44.9 COPD, MILD (HCC): ICD-10-CM

## 2023-06-27 DIAGNOSIS — Z00.00 MEDICARE ANNUAL WELLNESS VISIT, SUBSEQUENT: ICD-10-CM

## 2023-06-27 DIAGNOSIS — Z71.89 ADVANCE CARE PLANNING: ICD-10-CM

## 2023-06-27 DIAGNOSIS — E03.9 HYPOTHYROIDISM, UNSPECIFIED TYPE: Primary | ICD-10-CM

## 2023-06-27 PROCEDURE — G8417 CALC BMI ABV UP PARAM F/U: HCPCS | Performed by: INTERNAL MEDICINE

## 2023-06-27 PROCEDURE — 3023F SPIROM DOC REV: CPT | Performed by: INTERNAL MEDICINE

## 2023-06-27 PROCEDURE — 99213 OFFICE O/P EST LOW 20 MIN: CPT | Performed by: INTERNAL MEDICINE

## 2023-06-27 PROCEDURE — G8427 DOCREV CUR MEDS BY ELIG CLIN: HCPCS | Performed by: INTERNAL MEDICINE

## 2023-06-27 PROCEDURE — G8399 PT W/DXA RESULTS DOCUMENT: HCPCS | Performed by: INTERNAL MEDICINE

## 2023-06-27 PROCEDURE — G0439 PPPS, SUBSEQ VISIT: HCPCS | Performed by: INTERNAL MEDICINE

## 2023-06-27 PROCEDURE — 1090F PRES/ABSN URINE INCON ASSESS: CPT | Performed by: INTERNAL MEDICINE

## 2023-06-27 PROCEDURE — 99211 OFF/OP EST MAY X REQ PHY/QHP: CPT | Performed by: INTERNAL MEDICINE

## 2023-06-27 PROCEDURE — 4004F PT TOBACCO SCREEN RCVD TLK: CPT | Performed by: INTERNAL MEDICINE

## 2023-06-27 PROCEDURE — 1123F ACP DISCUSS/DSCN MKR DOCD: CPT | Performed by: INTERNAL MEDICINE

## 2023-06-27 SDOH — ECONOMIC STABILITY: FOOD INSECURITY: WITHIN THE PAST 12 MONTHS, THE FOOD YOU BOUGHT JUST DIDN'T LAST AND YOU DIDN'T HAVE MONEY TO GET MORE.: NEVER TRUE

## 2023-06-27 SDOH — ECONOMIC STABILITY: FOOD INSECURITY: WITHIN THE PAST 12 MONTHS, YOU WORRIED THAT YOUR FOOD WOULD RUN OUT BEFORE YOU GOT MONEY TO BUY MORE.: NEVER TRUE

## 2023-06-27 SDOH — ECONOMIC STABILITY: INCOME INSECURITY: HOW HARD IS IT FOR YOU TO PAY FOR THE VERY BASICS LIKE FOOD, HOUSING, MEDICAL CARE, AND HEATING?: NOT HARD AT ALL

## 2023-06-27 ASSESSMENT — PATIENT HEALTH QUESTIONNAIRE - PHQ9
SUM OF ALL RESPONSES TO PHQ QUESTIONS 1-9: 0
SUM OF ALL RESPONSES TO PHQ QUESTIONS 1-9: 0
1. LITTLE INTEREST OR PLEASURE IN DOING THINGS: 0
SUM OF ALL RESPONSES TO PHQ QUESTIONS 1-9: 0
SUM OF ALL RESPONSES TO PHQ9 QUESTIONS 1 & 2: 0
2. FEELING DOWN, DEPRESSED OR HOPELESS: 0
SUM OF ALL RESPONSES TO PHQ QUESTIONS 1-9: 0

## 2023-06-27 ASSESSMENT — LIFESTYLE VARIABLES
HOW OFTEN DO YOU HAVE A DRINK CONTAINING ALCOHOL: NEVER
HOW MANY STANDARD DRINKS CONTAINING ALCOHOL DO YOU HAVE ON A TYPICAL DAY: PATIENT DOES NOT DRINK

## 2023-06-27 ASSESSMENT — ANXIETY QUESTIONNAIRES
2. NOT BEING ABLE TO STOP OR CONTROL WORRYING: 0
5. BEING SO RESTLESS THAT IT IS HARD TO SIT STILL: 0
4. TROUBLE RELAXING: 0
7. FEELING AFRAID AS IF SOMETHING AWFUL MIGHT HAPPEN: 0
GAD7 TOTAL SCORE: 0
3. WORRYING TOO MUCH ABOUT DIFFERENT THINGS: 0
6. BECOMING EASILY ANNOYED OR IRRITABLE: 0
1. FEELING NERVOUS, ANXIOUS, OR ON EDGE: 0

## 2023-06-30 ENCOUNTER — HOSPITAL ENCOUNTER (OUTPATIENT)
Facility: HOSPITAL | Age: 78
End: 2023-06-30
Payer: MEDICARE

## 2023-06-30 LAB
EST. AVERAGE GLUCOSE BLD GHB EST-MCNC: 103 MG/DL
HBA1C MFR BLD: 5.2 % (ref 4.2–5.6)
T4 FREE SERPL-MCNC: 1.1 NG/DL (ref 0.7–1.5)
TSH SERPL DL<=0.05 MIU/L-ACNC: 2.18 UIU/ML (ref 0.36–3.74)

## 2023-06-30 PROCEDURE — 84443 ASSAY THYROID STIM HORMONE: CPT

## 2023-06-30 PROCEDURE — 36415 COLL VENOUS BLD VENIPUNCTURE: CPT

## 2023-06-30 PROCEDURE — 84439 ASSAY OF FREE THYROXINE: CPT

## 2023-06-30 PROCEDURE — 83036 HEMOGLOBIN GLYCOSYLATED A1C: CPT

## 2023-07-04 ASSESSMENT — ENCOUNTER SYMPTOMS
SORE THROAT: 0
ABDOMINAL PAIN: 0
BLOOD IN STOOL: 0
EYE PAIN: 0
COUGH: 0
SHORTNESS OF BREATH: 0
ANAL BLEEDING: 0

## 2023-07-04 NOTE — ACP (ADVANCE CARE PLANNING)
Advance Care Planning     General Advance Care Planning (ACP) Conversation    Date of Conversation: 6/27/23    Conducted with: Patient with Decision Making Capacity    Healthcare Decision Maker: Today we documented Decision Maker(s) consistent with ACP documents on file. Content/Action Overview: Has ACP document(s) on file - reflects the patient's care preferences  She has no changes to make to her preexisting advance directive.      Length of Voluntary ACP Conversation in minutes:  <16 minutes (Non-Billable)    Martha aBtes MD

## 2023-07-05 ENCOUNTER — TELEPHONE (OUTPATIENT)
Age: 78
End: 2023-07-05

## 2023-07-10 RX ORDER — TIOTROPIUM BROMIDE 18 UG/1
CAPSULE ORAL; RESPIRATORY (INHALATION)
Qty: 30 CAPSULE | Refills: 5 | Status: SHIPPED | OUTPATIENT
Start: 2023-07-10

## 2023-07-10 NOTE — TELEPHONE ENCOUNTER
Please refill if appropriate or refuse medication if not appropriate. PCP: Cassandra Neil MD     Last appt: 6/27/23    Last refill: 9/18/22        No future appointments.       Requested Prescriptions     Pending Prescriptions Disp Refills    SPIRIVA HANDIHALER 18 MCG inhalation capsule [Pharmacy Med Name: Spiriva HandiHaler 18 MCG Inhalation Capsule] 30 capsule 0     Sig: Inhale 1 puff by mouth once daily

## 2024-01-05 ENCOUNTER — OFFICE VISIT (OUTPATIENT)
Dept: URBAN - METROPOLITAN AREA CLINIC 16 | Facility: CLINIC | Age: 79
End: 2024-01-05
Payer: MEDICARE

## 2024-01-05 PROCEDURE — 99204 OFFICE O/P NEW MOD 45 MIN: CPT | Performed by: OPHTHALMOLOGY

## 2024-01-05 ASSESSMENT — KERATOMETRY
OS: 43.88
OD: 43.50

## 2024-01-05 ASSESSMENT — VISUAL ACUITY
OS: 20/25
OD: 20/30

## 2024-01-05 ASSESSMENT — INTRAOCULAR PRESSURE
OS: 16
OD: 16

## 2024-02-07 ENCOUNTER — TECH ONLY (OUTPATIENT)
Dept: URBAN - METROPOLITAN AREA CLINIC 16 | Facility: CLINIC | Age: 79
End: 2024-02-07
Payer: MEDICARE

## 2024-02-07 DIAGNOSIS — H25.813 COMBINED FORMS OF AGE-RELATED CATARACT, BILATERAL: Primary | ICD-10-CM

## 2024-02-07 RX ORDER — OFLOXACIN 3 MG/ML
0.3 % SOLUTION/ DROPS OPHTHALMIC
Qty: 5 | Refills: 1 | Status: ACTIVE
Start: 2024-02-07

## 2024-02-07 RX ORDER — PREDNISOLONE ACETATE 10 MG/ML
1 % SUSPENSION/ DROPS OPHTHALMIC
Qty: 5 | Refills: 1 | Status: ACTIVE
Start: 2024-02-07

## 2024-02-07 RX ORDER — KETOROLAC TROMETHAMINE 5 MG/ML
0.5 % SOLUTION OPHTHALMIC
Qty: 5 | Refills: 1 | Status: ACTIVE
Start: 2024-02-07

## 2024-02-07 ASSESSMENT — PACHYMETRY
OD: 24.90
OD: 2.67
OS: 25.19
OS: 2.91

## 2024-02-12 ENCOUNTER — SURGERY (OUTPATIENT)
Dept: URBAN - METROPOLITAN AREA SURGERY 11 | Facility: SURGERY | Age: 79
End: 2024-02-12
Payer: MEDICARE

## 2024-02-12 PROCEDURE — 66984 XCAPSL CTRC RMVL W/O ECP: CPT | Performed by: OPHTHALMOLOGY

## 2024-02-13 ENCOUNTER — POST-OPERATIVE VISIT (OUTPATIENT)
Dept: URBAN - METROPOLITAN AREA CLINIC 16 | Facility: CLINIC | Age: 79
End: 2024-02-13

## 2024-02-13 DIAGNOSIS — Z48.810 ENCOUNTER FOR SURGICAL AFTERCARE FOLLOWING SURGERY ON A SENSE ORGAN: Primary | ICD-10-CM

## 2024-02-13 PROCEDURE — 99024 POSTOP FOLLOW-UP VISIT: CPT | Performed by: OPTOMETRIST

## 2024-02-13 ASSESSMENT — INTRAOCULAR PRESSURE
OD: 16
OS: 16

## 2024-02-20 ENCOUNTER — POST-OPERATIVE VISIT (OUTPATIENT)
Dept: URBAN - METROPOLITAN AREA CLINIC 16 | Facility: CLINIC | Age: 79
End: 2024-02-20

## 2024-02-20 DIAGNOSIS — Z48.810 ENCOUNTER FOR SURGICAL AFTERCARE FOLLOWING SURGERY ON THE SENSE ORGANS: Primary | ICD-10-CM

## 2024-02-20 PROCEDURE — 99024 POSTOP FOLLOW-UP VISIT: CPT | Performed by: OPTOMETRIST

## 2024-02-20 RX ORDER — KETOROLAC TROMETHAMINE 5 MG/ML
0.5 % SOLUTION OPHTHALMIC
Qty: 5 | Refills: 1 | Status: ACTIVE
Start: 2024-02-20

## 2024-02-20 RX ORDER — PREDNISOLONE ACETATE 10 MG/ML
1 % SUSPENSION/ DROPS OPHTHALMIC
Qty: 5 | Refills: 1 | Status: ACTIVE
Start: 2024-02-20

## 2024-02-20 RX ORDER — OFLOXACIN 3 MG/ML
0.3 % SOLUTION/ DROPS OPHTHALMIC
Qty: 5 | Refills: 1 | Status: ACTIVE
Start: 2024-02-20

## 2024-02-20 ASSESSMENT — INTRAOCULAR PRESSURE
OS: 16
OD: 16

## 2024-02-26 ENCOUNTER — SURGERY (OUTPATIENT)
Dept: URBAN - METROPOLITAN AREA SURGERY 11 | Facility: SURGERY | Age: 79
End: 2024-02-26
Payer: MEDICARE

## 2024-02-26 PROCEDURE — 66984 XCAPSL CTRC RMVL W/O ECP: CPT | Performed by: OPHTHALMOLOGY

## 2024-02-27 ENCOUNTER — POST-OPERATIVE VISIT (OUTPATIENT)
Dept: URBAN - METROPOLITAN AREA CLINIC 16 | Facility: CLINIC | Age: 79
End: 2024-02-27

## 2024-02-27 DIAGNOSIS — Z96.1 PRESENCE OF INTRAOCULAR LENS: Primary | ICD-10-CM

## 2024-02-27 PROCEDURE — 99024 POSTOP FOLLOW-UP VISIT: CPT | Performed by: OPTOMETRIST

## 2024-02-27 ASSESSMENT — INTRAOCULAR PRESSURE: OS: 17

## 2024-03-05 ENCOUNTER — POST-OPERATIVE VISIT (OUTPATIENT)
Dept: URBAN - METROPOLITAN AREA CLINIC 16 | Facility: CLINIC | Age: 79
End: 2024-03-05

## 2024-03-05 DIAGNOSIS — Z96.1 PRESENCE OF INTRAOCULAR LENS: Primary | ICD-10-CM

## 2024-03-05 PROCEDURE — 99024 POSTOP FOLLOW-UP VISIT: CPT | Performed by: OPTOMETRIST

## 2024-03-05 ASSESSMENT — INTRAOCULAR PRESSURE
OS: 16
OD: 16

## 2024-03-26 ENCOUNTER — POST-OPERATIVE VISIT (OUTPATIENT)
Dept: URBAN - METROPOLITAN AREA CLINIC 16 | Facility: CLINIC | Age: 79
End: 2024-03-26

## 2024-03-26 DIAGNOSIS — Z96.1 PRESENCE OF INTRAOCULAR LENS: Primary | ICD-10-CM

## 2024-03-26 PROCEDURE — 99024 POSTOP FOLLOW-UP VISIT: CPT | Performed by: OPTOMETRIST

## 2024-03-26 ASSESSMENT — INTRAOCULAR PRESSURE
OS: 16
OD: 16